# Patient Record
Sex: MALE | Race: WHITE | NOT HISPANIC OR LATINO | Employment: FULL TIME | RURAL
[De-identification: names, ages, dates, MRNs, and addresses within clinical notes are randomized per-mention and may not be internally consistent; named-entity substitution may affect disease eponyms.]

---

## 2017-01-16 ENCOUNTER — HISTORICAL (OUTPATIENT)
Dept: ADMINISTRATIVE | Facility: HOSPITAL | Age: 62
End: 2017-01-16

## 2017-01-17 LAB
LAB AP CLINICAL INFORMATION: NORMAL
LAB AP COMMENTS: NORMAL
LAB AP DIAGNOSIS - HISTORICAL: NORMAL
LAB AP GROSS PATHOLOGY - HISTORICAL: NORMAL
LAB AP SPECIMEN SUBMITTED - HISTORICAL: NORMAL

## 2020-09-28 ENCOUNTER — HISTORICAL (OUTPATIENT)
Dept: ADMINISTRATIVE | Facility: HOSPITAL | Age: 65
End: 2020-09-28

## 2021-01-19 ENCOUNTER — HISTORICAL (OUTPATIENT)
Dept: ADMINISTRATIVE | Facility: HOSPITAL | Age: 66
End: 2021-01-19

## 2021-01-19 LAB
GLUCOSE SERPL-MCNC: 172 MG/DL (ref 70–105)
GLUCOSE SERPL-MCNC: 211 MG/DL (ref 70–105)

## 2021-08-04 ENCOUNTER — OFFICE VISIT (OUTPATIENT)
Dept: FAMILY MEDICINE | Facility: CLINIC | Age: 66
End: 2021-08-04
Payer: COMMERCIAL

## 2021-08-04 DIAGNOSIS — Z11.52 ENCOUNTER FOR SCREENING FOR COVID-19: Primary | ICD-10-CM

## 2021-08-04 PROCEDURE — 99202 OFFICE O/P NEW SF 15 MIN: CPT | Mod: GC,,, | Performed by: FAMILY MEDICINE

## 2021-08-04 PROCEDURE — 87635 SARS-COV-2 (COVID-19) QUALITATIVE PCR: ICD-10-PCS | Mod: ,,, | Performed by: CLINICAL MEDICAL LABORATORY

## 2021-08-04 PROCEDURE — 87635 SARS-COV-2 COVID-19 AMP PRB: CPT | Mod: ,,, | Performed by: CLINICAL MEDICAL LABORATORY

## 2021-08-04 PROCEDURE — 99202 PR OFFICE/OUTPT VISIT, NEW, LEVL II, 15-29 MIN: ICD-10-PCS | Mod: GC,,, | Performed by: FAMILY MEDICINE

## 2021-08-05 LAB — SARS-COV-2 RNA RESP QL NAA+PROBE: NEGATIVE

## 2021-08-11 DIAGNOSIS — R50.9 FEVER, UNSPECIFIED FEVER CAUSE: Primary | ICD-10-CM

## 2021-08-11 LAB
CTP QC/QA: YES
SARS-COV-2 RDRP RESP QL NAA+PROBE: POSITIVE

## 2021-08-11 PROCEDURE — U0002: ICD-10-PCS | Mod: ,,, | Performed by: NURSE PRACTITIONER

## 2021-08-11 PROCEDURE — U0002 COVID-19 LAB TEST NON-CDC: HCPCS | Mod: ,,, | Performed by: NURSE PRACTITIONER

## 2021-08-11 RX ORDER — IVERMECTIN 3 MG/1
18 TABLET ORAL ONCE
Qty: 6 TABLET | Refills: 0 | Status: SHIPPED | OUTPATIENT
Start: 2021-08-11 | End: 2021-08-11

## 2021-08-11 RX ORDER — PREDNISONE 20 MG/1
60 TABLET ORAL DAILY
Qty: 15 TABLET | Refills: 0 | Status: SHIPPED | OUTPATIENT
Start: 2021-08-11 | End: 2021-10-13 | Stop reason: ALTCHOICE

## 2021-08-11 RX ORDER — CETIRIZINE HYDROCHLORIDE, PSEUDOEPHEDRINE HYDROCHLORIDE 5; 120 MG/1; MG/1
5-120 TABLET, FILM COATED, EXTENDED RELEASE ORAL 2 TIMES DAILY
Qty: 24 TABLET | Refills: 5 | Status: SHIPPED | OUTPATIENT
Start: 2021-08-11 | End: 2021-08-21

## 2021-08-11 RX ORDER — AZITHROMYCIN 250 MG/1
TABLET, FILM COATED ORAL
Qty: 6 TABLET | Refills: 1 | Status: SHIPPED | OUTPATIENT
Start: 2021-08-11 | End: 2021-10-13 | Stop reason: ALTCHOICE

## 2021-08-12 ENCOUNTER — INFUSION (OUTPATIENT)
Dept: INFECTIOUS DISEASES | Facility: HOSPITAL | Age: 66
End: 2021-08-12
Attending: PSYCHOLOGIST
Payer: COMMERCIAL

## 2021-08-12 VITALS
RESPIRATION RATE: 18 BRPM | TEMPERATURE: 98 F | DIASTOLIC BLOOD PRESSURE: 79 MMHG | SYSTOLIC BLOOD PRESSURE: 153 MMHG | OXYGEN SATURATION: 97 % | HEART RATE: 82 BPM

## 2021-08-12 DIAGNOSIS — U07.1 COVID-19: Primary | ICD-10-CM

## 2021-08-12 PROCEDURE — M0243 CASIRIVI AND IMDEVI INFUSION: HCPCS | Performed by: FAMILY MEDICINE

## 2021-08-12 PROCEDURE — 25000003 PHARM REV CODE 250: Performed by: FAMILY MEDICINE

## 2021-08-12 PROCEDURE — 63600175 PHARM REV CODE 636 W HCPCS: Performed by: FAMILY MEDICINE

## 2021-08-12 RX ORDER — ALBUTEROL SULFATE 90 UG/1
2 AEROSOL, METERED RESPIRATORY (INHALATION)
Status: DISCONTINUED | OUTPATIENT
Start: 2021-08-12 | End: 2021-10-13

## 2021-08-12 RX ORDER — EPINEPHRINE 0.3 MG/.3ML
0.3 INJECTION SUBCUTANEOUS
Status: DISCONTINUED | OUTPATIENT
Start: 2021-08-12 | End: 2021-10-13

## 2021-08-12 RX ORDER — ONDANSETRON 4 MG/1
4 TABLET, ORALLY DISINTEGRATING ORAL ONCE AS NEEDED
Status: DISCONTINUED | OUTPATIENT
Start: 2021-08-12 | End: 2021-10-13

## 2021-08-12 RX ORDER — ACETAMINOPHEN 325 MG/1
650 TABLET ORAL ONCE AS NEEDED
Status: DISCONTINUED | OUTPATIENT
Start: 2021-08-12 | End: 2021-10-13

## 2021-08-12 RX ORDER — DIPHENHYDRAMINE HYDROCHLORIDE 50 MG/ML
25 INJECTION INTRAMUSCULAR; INTRAVENOUS ONCE AS NEEDED
Status: DISCONTINUED | OUTPATIENT
Start: 2021-08-12 | End: 2021-10-13

## 2021-08-12 RX ADMIN — CASIRIVIMAB AND IMDEVIMAB 600 MG: 600; 600 INJECTION, SOLUTION, CONCENTRATE INTRAVENOUS at 08:08

## 2021-08-22 ENCOUNTER — OFFICE VISIT (OUTPATIENT)
Dept: FAMILY MEDICINE | Facility: CLINIC | Age: 66
End: 2021-08-22
Payer: COMMERCIAL

## 2021-08-22 VITALS — TEMPERATURE: 98 F | OXYGEN SATURATION: 95 % | HEART RATE: 90 BPM

## 2021-08-22 DIAGNOSIS — Z20.822 COVID-19 RULED OUT: Primary | ICD-10-CM

## 2021-08-22 LAB
CTP QC/QA: YES
FLUAV AG NPH QL: NEGATIVE
FLUBV AG NPH QL: NEGATIVE
SARS-COV-2 AG RESP QL IA.RAPID: NEGATIVE

## 2021-08-22 PROCEDURE — 87428 POCT SARS-COV2 (COVID) WITH FLU ANTIGEN: ICD-10-PCS | Mod: QW,,, | Performed by: NURSE PRACTITIONER

## 2021-08-22 PROCEDURE — 1159F MED LIST DOCD IN RCRD: CPT | Mod: CPTII,,, | Performed by: NURSE PRACTITIONER

## 2021-08-22 PROCEDURE — 99213 OFFICE O/P EST LOW 20 MIN: CPT | Mod: CS,,, | Performed by: NURSE PRACTITIONER

## 2021-08-22 PROCEDURE — 1160F RVW MEDS BY RX/DR IN RCRD: CPT | Mod: CPTII,,, | Performed by: NURSE PRACTITIONER

## 2021-08-22 PROCEDURE — 99051 PR MEDICAL SERVICES, EVE/WKEND/HOLIDAY: ICD-10-PCS | Mod: ,,, | Performed by: NURSE PRACTITIONER

## 2021-08-22 PROCEDURE — 1160F PR REVIEW ALL MEDS BY PRESCRIBER/CLIN PHARMACIST DOCUMENTED: ICD-10-PCS | Mod: CPTII,,, | Performed by: NURSE PRACTITIONER

## 2021-08-22 PROCEDURE — 99051 MED SERV EVE/WKEND/HOLIDAY: CPT | Mod: ,,, | Performed by: NURSE PRACTITIONER

## 2021-08-22 PROCEDURE — 87428 SARSCOV & INF VIR A&B AG IA: CPT | Mod: QW,,, | Performed by: NURSE PRACTITIONER

## 2021-08-22 PROCEDURE — 99213 PR OFFICE/OUTPT VISIT, EST, LEVL III, 20-29 MIN: ICD-10-PCS | Mod: CS,,, | Performed by: NURSE PRACTITIONER

## 2021-08-22 PROCEDURE — 1159F PR MEDICATION LIST DOCUMENTED IN MEDICAL RECORD: ICD-10-PCS | Mod: CPTII,,, | Performed by: NURSE PRACTITIONER

## 2021-10-13 ENCOUNTER — OFFICE VISIT (OUTPATIENT)
Dept: PRIMARY CARE CLINIC | Facility: CLINIC | Age: 66
End: 2021-10-13
Payer: COMMERCIAL

## 2021-10-13 VITALS
DIASTOLIC BLOOD PRESSURE: 60 MMHG | HEIGHT: 65 IN | TEMPERATURE: 98 F | RESPIRATION RATE: 16 BRPM | SYSTOLIC BLOOD PRESSURE: 126 MMHG | HEART RATE: 76 BPM | BODY MASS INDEX: 30.99 KG/M2 | OXYGEN SATURATION: 96 % | WEIGHT: 186 LBS

## 2021-10-13 DIAGNOSIS — M54.41 ACUTE RIGHT-SIDED LOW BACK PAIN WITH RIGHT-SIDED SCIATICA: Primary | ICD-10-CM

## 2021-10-13 DIAGNOSIS — M54.31 SCIATICA OF RIGHT SIDE: ICD-10-CM

## 2021-10-13 DIAGNOSIS — N40.0 BENIGN PROSTATIC HYPERPLASIA, UNSPECIFIED WHETHER LOWER URINARY TRACT SYMPTOMS PRESENT: ICD-10-CM

## 2021-10-13 DIAGNOSIS — R30.0 DYSURIA: ICD-10-CM

## 2021-10-13 PROCEDURE — 3008F PR BODY MASS INDEX (BMI) DOCUMENTED: ICD-10-PCS | Mod: CPTII,,, | Performed by: NURSE PRACTITIONER

## 2021-10-13 PROCEDURE — 1159F MED LIST DOCD IN RCRD: CPT | Mod: CPTII,,, | Performed by: NURSE PRACTITIONER

## 2021-10-13 PROCEDURE — 3008F BODY MASS INDEX DOCD: CPT | Mod: CPTII,,, | Performed by: NURSE PRACTITIONER

## 2021-10-13 PROCEDURE — 99213 OFFICE O/P EST LOW 20 MIN: CPT | Mod: 25,,, | Performed by: NURSE PRACTITIONER

## 2021-10-13 PROCEDURE — 3078F DIAST BP <80 MM HG: CPT | Mod: CPTII,,, | Performed by: NURSE PRACTITIONER

## 2021-10-13 PROCEDURE — 99213 PR OFFICE/OUTPT VISIT, EST, LEVL III, 20-29 MIN: ICD-10-PCS | Mod: 25,,, | Performed by: NURSE PRACTITIONER

## 2021-10-13 PROCEDURE — 1159F PR MEDICATION LIST DOCUMENTED IN MEDICAL RECORD: ICD-10-PCS | Mod: CPTII,,, | Performed by: NURSE PRACTITIONER

## 2021-10-13 PROCEDURE — 3074F SYST BP LT 130 MM HG: CPT | Mod: CPTII,,, | Performed by: NURSE PRACTITIONER

## 2021-10-13 PROCEDURE — 1160F PR REVIEW ALL MEDS BY PRESCRIBER/CLIN PHARMACIST DOCUMENTED: ICD-10-PCS | Mod: CPTII,,, | Performed by: NURSE PRACTITIONER

## 2021-10-13 PROCEDURE — 1160F RVW MEDS BY RX/DR IN RCRD: CPT | Mod: CPTII,,, | Performed by: NURSE PRACTITIONER

## 2021-10-13 PROCEDURE — 96372 PR INJECTION,THERAP/PROPH/DIAG2ST, IM OR SUBCUT: ICD-10-PCS | Mod: ,,, | Performed by: NURSE PRACTITIONER

## 2021-10-13 PROCEDURE — 3074F PR MOST RECENT SYSTOLIC BLOOD PRESSURE < 130 MM HG: ICD-10-PCS | Mod: CPTII,,, | Performed by: NURSE PRACTITIONER

## 2021-10-13 PROCEDURE — 3078F PR MOST RECENT DIASTOLIC BLOOD PRESSURE < 80 MM HG: ICD-10-PCS | Mod: CPTII,,, | Performed by: NURSE PRACTITIONER

## 2021-10-13 PROCEDURE — 96372 THER/PROPH/DIAG INJ SC/IM: CPT | Mod: ,,, | Performed by: NURSE PRACTITIONER

## 2021-10-13 RX ORDER — KETOROLAC TROMETHAMINE 30 MG/ML
30 INJECTION, SOLUTION INTRAMUSCULAR; INTRAVENOUS
Status: COMPLETED | OUTPATIENT
Start: 2021-10-13 | End: 2021-10-13

## 2021-10-13 RX ORDER — METFORMIN HYDROCHLORIDE 1000 MG/1
1 TABLET ORAL 2 TIMES DAILY
COMMUNITY
End: 2021-10-25 | Stop reason: SDUPTHER

## 2021-10-13 RX ORDER — MONTELUKAST SODIUM 10 MG/1
1 TABLET ORAL DAILY
COMMUNITY
Start: 2021-07-19 | End: 2021-10-25 | Stop reason: SDUPTHER

## 2021-10-13 RX ORDER — CITALOPRAM 40 MG/1
1 TABLET, FILM COATED ORAL DAILY
COMMUNITY
Start: 2021-09-23 | End: 2021-10-25 | Stop reason: SDUPTHER

## 2021-10-13 RX ORDER — CYCLOBENZAPRINE HCL 10 MG
10 TABLET ORAL 3 TIMES DAILY PRN
Qty: 30 TABLET | Refills: 0 | Status: SHIPPED | OUTPATIENT
Start: 2021-10-13 | End: 2022-03-09

## 2021-10-13 RX ORDER — FOLIC ACID/MULTIVIT,IRON,MINER 0.4MG-18MG
1 TABLET ORAL DAILY
COMMUNITY

## 2021-10-13 RX ORDER — IBUPROFEN 800 MG/1
800 TABLET ORAL EVERY 6 HOURS PRN
Qty: 60 TABLET | Refills: 0 | Status: SHIPPED | OUTPATIENT
Start: 2021-10-13 | End: 2022-03-09

## 2021-10-13 RX ORDER — CETIRIZINE HYDROCHLORIDE 10 MG/1
1 TABLET ORAL DAILY
COMMUNITY
End: 2021-10-25 | Stop reason: SDUPTHER

## 2021-10-13 RX ADMIN — KETOROLAC TROMETHAMINE 30 MG: 30 INJECTION, SOLUTION INTRAMUSCULAR; INTRAVENOUS at 09:10

## 2021-10-25 DIAGNOSIS — N40.0 BENIGN PROSTATIC HYPERPLASIA, UNSPECIFIED WHETHER LOWER URINARY TRACT SYMPTOMS PRESENT: Primary | ICD-10-CM

## 2021-10-25 DIAGNOSIS — F32.A DEPRESSION, UNSPECIFIED DEPRESSION TYPE: ICD-10-CM

## 2021-10-25 DIAGNOSIS — J30.2 SEASONAL ALLERGIC RHINITIS, UNSPECIFIED TRIGGER: ICD-10-CM

## 2021-10-25 DIAGNOSIS — E11.9 DIABETES MELLITUS WITHOUT COMPLICATION: ICD-10-CM

## 2021-10-25 RX ORDER — TAMSULOSIN HYDROCHLORIDE 0.4 MG/1
1 CAPSULE ORAL NIGHTLY
Qty: 90 CAPSULE | Refills: 3 | Status: SHIPPED | OUTPATIENT
Start: 2021-10-25 | End: 2021-10-29

## 2021-10-25 RX ORDER — MONTELUKAST SODIUM 10 MG/1
10 TABLET ORAL DAILY
Qty: 90 TABLET | Refills: 3 | Status: SHIPPED | OUTPATIENT
Start: 2021-10-25 | End: 2022-11-28 | Stop reason: SDUPTHER

## 2021-10-25 RX ORDER — CETIRIZINE HYDROCHLORIDE 10 MG/1
10 TABLET ORAL DAILY
Qty: 90 TABLET | Refills: 3 | Status: SHIPPED | OUTPATIENT
Start: 2021-10-25 | End: 2022-10-27

## 2021-10-25 RX ORDER — CITALOPRAM 40 MG/1
40 TABLET, FILM COATED ORAL DAILY
Qty: 90 TABLET | Refills: 3 | Status: SHIPPED | OUTPATIENT
Start: 2021-10-25 | End: 2022-11-28 | Stop reason: SDUPTHER

## 2021-10-25 RX ORDER — METFORMIN HYDROCHLORIDE 1000 MG/1
1000 TABLET ORAL 2 TIMES DAILY
Qty: 180 TABLET | Refills: 3 | Status: SHIPPED | OUTPATIENT
Start: 2021-10-25 | End: 2022-10-27 | Stop reason: SDUPTHER

## 2021-10-29 ENCOUNTER — OFFICE VISIT (OUTPATIENT)
Dept: UROLOGY | Facility: CLINIC | Age: 66
End: 2021-10-29
Payer: COMMERCIAL

## 2021-10-29 ENCOUNTER — TELEPHONE (OUTPATIENT)
Dept: UROLOGY | Facility: CLINIC | Age: 66
End: 2021-10-29
Payer: COMMERCIAL

## 2021-10-29 VITALS
WEIGHT: 186 LBS | SYSTOLIC BLOOD PRESSURE: 142 MMHG | HEART RATE: 93 BPM | OXYGEN SATURATION: 95 % | TEMPERATURE: 98 F | BODY MASS INDEX: 30.99 KG/M2 | HEIGHT: 65 IN | DIASTOLIC BLOOD PRESSURE: 80 MMHG

## 2021-10-29 DIAGNOSIS — Z12.5 ENCOUNTER FOR PROSTATE CANCER SCREENING: ICD-10-CM

## 2021-10-29 DIAGNOSIS — N40.1 BPH WITH OBSTRUCTION/LOWER URINARY TRACT SYMPTOMS: Chronic | ICD-10-CM

## 2021-10-29 DIAGNOSIS — N41.0 ACUTE PROSTATITIS: Primary | ICD-10-CM

## 2021-10-29 DIAGNOSIS — N40.0 BENIGN PROSTATIC HYPERPLASIA, UNSPECIFIED WHETHER LOWER URINARY TRACT SYMPTOMS PRESENT: ICD-10-CM

## 2021-10-29 DIAGNOSIS — N13.8 BPH WITH OBSTRUCTION/LOWER URINARY TRACT SYMPTOMS: Chronic | ICD-10-CM

## 2021-10-29 LAB
BILIRUB UR QL STRIP: NEGATIVE
CLARITY UR: CLEAR
COLOR UR: YELLOW
GLUCOSE UR STRIP-MCNC: 250 MG/DL
KETONES UR STRIP-SCNC: NEGATIVE MG/DL
LEUKOCYTE ESTERASE UR QL STRIP: NEGATIVE
NITRITE UR QL STRIP: NEGATIVE
PH UR STRIP: 6 PH UNITS
PROT UR QL STRIP: NEGATIVE
RBC # UR STRIP: NEGATIVE /UL
SP GR UR STRIP: 1.01
UROBILINOGEN UR STRIP-ACNC: 0.2 MG/DL

## 2021-10-29 PROCEDURE — 3008F PR BODY MASS INDEX (BMI) DOCUMENTED: ICD-10-PCS | Mod: CPTII,,, | Performed by: NURSE PRACTITIONER

## 2021-10-29 PROCEDURE — 81003 URINALYSIS AUTO W/O SCOPE: CPT | Mod: QW,,, | Performed by: CLINICAL MEDICAL LABORATORY

## 2021-10-29 PROCEDURE — 3079F PR MOST RECENT DIASTOLIC BLOOD PRESSURE 80-89 MM HG: ICD-10-PCS | Mod: CPTII,,, | Performed by: NURSE PRACTITIONER

## 2021-10-29 PROCEDURE — 3077F PR MOST RECENT SYSTOLIC BLOOD PRESSURE >= 140 MM HG: ICD-10-PCS | Mod: CPTII,,, | Performed by: NURSE PRACTITIONER

## 2021-10-29 PROCEDURE — 51798 US URINE CAPACITY MEASURE: CPT | Mod: PBBFAC | Performed by: NURSE PRACTITIONER

## 2021-10-29 PROCEDURE — 3079F DIAST BP 80-89 MM HG: CPT | Mod: CPTII,,, | Performed by: NURSE PRACTITIONER

## 2021-10-29 PROCEDURE — 99213 OFFICE O/P EST LOW 20 MIN: CPT | Mod: PBBFAC | Performed by: NURSE PRACTITIONER

## 2021-10-29 PROCEDURE — 99214 OFFICE O/P EST MOD 30 MIN: CPT | Mod: S$PBB,,, | Performed by: NURSE PRACTITIONER

## 2021-10-29 PROCEDURE — 99214 PR OFFICE/OUTPT VISIT, EST, LEVL IV, 30-39 MIN: ICD-10-PCS | Mod: S$PBB,,, | Performed by: NURSE PRACTITIONER

## 2021-10-29 PROCEDURE — 3077F SYST BP >= 140 MM HG: CPT | Mod: CPTII,,, | Performed by: NURSE PRACTITIONER

## 2021-10-29 PROCEDURE — 3008F BODY MASS INDEX DOCD: CPT | Mod: CPTII,,, | Performed by: NURSE PRACTITIONER

## 2021-10-29 PROCEDURE — 81003 URINALYSIS, REFLEX TO URINE CULTURE: ICD-10-PCS | Mod: QW,,, | Performed by: CLINICAL MEDICAL LABORATORY

## 2021-10-29 RX ORDER — SULFAMETHOXAZOLE AND TRIMETHOPRIM 800; 160 MG/1; MG/1
1 TABLET ORAL 2 TIMES DAILY
Qty: 60 TABLET | Refills: 0 | Status: SHIPPED | OUTPATIENT
Start: 2021-10-29 | End: 2021-11-28

## 2021-10-29 RX ORDER — TAMSULOSIN HYDROCHLORIDE 0.4 MG/1
1 CAPSULE ORAL 2 TIMES DAILY
Qty: 90 CAPSULE | Refills: 3 | Status: SHIPPED | OUTPATIENT
Start: 2021-10-29 | End: 2022-06-16 | Stop reason: SDUPTHER

## 2021-11-01 ENCOUNTER — TELEPHONE (OUTPATIENT)
Dept: UROLOGY | Facility: CLINIC | Age: 66
End: 2021-11-01
Payer: COMMERCIAL

## 2021-12-16 DIAGNOSIS — E11.9 DIABETES MELLITUS WITHOUT COMPLICATION: Primary | ICD-10-CM

## 2021-12-17 ENCOUNTER — OFFICE VISIT (OUTPATIENT)
Dept: UROLOGY | Facility: CLINIC | Age: 66
End: 2021-12-17
Payer: COMMERCIAL

## 2021-12-17 ENCOUNTER — TELEPHONE (OUTPATIENT)
Dept: UROLOGY | Facility: CLINIC | Age: 66
End: 2021-12-17
Payer: COMMERCIAL

## 2021-12-17 VITALS
BODY MASS INDEX: 30.66 KG/M2 | HEIGHT: 65 IN | WEIGHT: 184 LBS | SYSTOLIC BLOOD PRESSURE: 150 MMHG | OXYGEN SATURATION: 95 % | TEMPERATURE: 98 F | DIASTOLIC BLOOD PRESSURE: 70 MMHG | HEART RATE: 63 BPM

## 2021-12-17 DIAGNOSIS — N40.1 BPH WITH OBSTRUCTION/LOWER URINARY TRACT SYMPTOMS: Primary | ICD-10-CM

## 2021-12-17 DIAGNOSIS — N13.8 BPH WITH OBSTRUCTION/LOWER URINARY TRACT SYMPTOMS: Primary | ICD-10-CM

## 2021-12-17 DIAGNOSIS — N41.0 ACUTE PROSTATITIS: ICD-10-CM

## 2021-12-17 DIAGNOSIS — Z12.5 ENCOUNTER FOR PROSTATE CANCER SCREENING: ICD-10-CM

## 2021-12-17 PROCEDURE — 99214 PR OFFICE/OUTPT VISIT, EST, LEVL IV, 30-39 MIN: ICD-10-PCS | Mod: S$PBB,,, | Performed by: NURSE PRACTITIONER

## 2021-12-17 PROCEDURE — 99214 OFFICE O/P EST MOD 30 MIN: CPT | Mod: S$PBB,,, | Performed by: NURSE PRACTITIONER

## 2021-12-17 PROCEDURE — 99214 OFFICE O/P EST MOD 30 MIN: CPT | Mod: PBBFAC | Performed by: NURSE PRACTITIONER

## 2021-12-17 RX ORDER — FINASTERIDE 5 MG/1
5 TABLET, FILM COATED ORAL DAILY
Qty: 90 TABLET | Refills: 3 | Status: SHIPPED | OUTPATIENT
Start: 2021-12-17 | End: 2022-12-19 | Stop reason: SDUPTHER

## 2021-12-20 DIAGNOSIS — E11.9 DIABETES MELLITUS WITHOUT COMPLICATION: Primary | ICD-10-CM

## 2021-12-20 RX ORDER — EMPAGLIFLOZIN 10 MG/1
10 TABLET, FILM COATED ORAL DAILY
Qty: 90 TABLET | Refills: 1 | Status: SHIPPED | OUTPATIENT
Start: 2021-12-20 | End: 2022-03-09

## 2021-12-20 RX ORDER — DAPAGLIFLOZIN 10 MG/1
10 TABLET, FILM COATED ORAL DAILY
Qty: 90 TABLET | Refills: 3 | Status: SHIPPED | OUTPATIENT
Start: 2021-12-20 | End: 2021-12-20

## 2022-01-05 DIAGNOSIS — Z11.52 ENCOUNTER FOR SCREENING FOR COVID-19: Primary | ICD-10-CM

## 2022-01-14 ENCOUNTER — PATIENT MESSAGE (OUTPATIENT)
Dept: PRIMARY CARE CLINIC | Facility: CLINIC | Age: 67
End: 2022-01-14
Payer: COMMERCIAL

## 2022-03-09 RX ORDER — ALBUTEROL SULFATE 90 UG/1
2 AEROSOL, METERED RESPIRATORY (INHALATION) EVERY 6 HOURS PRN
Qty: 18 G | Refills: 0 | Status: SHIPPED | OUTPATIENT
Start: 2022-03-09 | End: 2022-06-08 | Stop reason: SDUPTHER

## 2022-03-09 RX ORDER — DAPAGLIFLOZIN 10 MG/1
1 TABLET, FILM COATED ORAL DAILY
COMMUNITY
Start: 2021-12-20 | End: 2023-07-20

## 2022-03-18 ENCOUNTER — OFFICE VISIT (OUTPATIENT)
Dept: UROLOGY | Facility: CLINIC | Age: 67
End: 2022-03-18
Payer: COMMERCIAL

## 2022-03-18 VITALS
DIASTOLIC BLOOD PRESSURE: 82 MMHG | SYSTOLIC BLOOD PRESSURE: 139 MMHG | WEIGHT: 184 LBS | TEMPERATURE: 98 F | OXYGEN SATURATION: 94 % | HEIGHT: 65 IN | HEART RATE: 95 BPM | BODY MASS INDEX: 30.66 KG/M2

## 2022-03-18 DIAGNOSIS — N40.1 BPH WITH OBSTRUCTION/LOWER URINARY TRACT SYMPTOMS: Primary | Chronic | ICD-10-CM

## 2022-03-18 DIAGNOSIS — N41.0 ACUTE PROSTATITIS: ICD-10-CM

## 2022-03-18 DIAGNOSIS — Z12.5 ENCOUNTER FOR PROSTATE CANCER SCREENING: ICD-10-CM

## 2022-03-18 DIAGNOSIS — N13.8 BPH WITH OBSTRUCTION/LOWER URINARY TRACT SYMPTOMS: Primary | Chronic | ICD-10-CM

## 2022-03-18 LAB
BILIRUB UR QL STRIP: NEGATIVE
CLARITY UR: CLEAR
COLOR UR: YELLOW
GLUCOSE UR STRIP-MCNC: >=1000 MG/DL
KETONES UR STRIP-SCNC: NEGATIVE MG/DL
LEUKOCYTE ESTERASE UR QL STRIP: NEGATIVE
NITRITE UR QL STRIP: NEGATIVE
PH UR STRIP: 6.5 PH UNITS
PROT UR QL STRIP: NEGATIVE
RBC # UR STRIP: NEGATIVE /UL
SP GR UR STRIP: <=1.005
UROBILINOGEN UR STRIP-ACNC: 0.2 MG/DL

## 2022-03-18 PROCEDURE — 3079F PR MOST RECENT DIASTOLIC BLOOD PRESSURE 80-89 MM HG: ICD-10-PCS | Mod: CPTII,,, | Performed by: NURSE PRACTITIONER

## 2022-03-18 PROCEDURE — 3008F PR BODY MASS INDEX (BMI) DOCUMENTED: ICD-10-PCS | Mod: CPTII,,, | Performed by: NURSE PRACTITIONER

## 2022-03-18 PROCEDURE — 1159F MED LIST DOCD IN RCRD: CPT | Mod: CPTII,,, | Performed by: NURSE PRACTITIONER

## 2022-03-18 PROCEDURE — 99214 OFFICE O/P EST MOD 30 MIN: CPT | Mod: S$PBB,,, | Performed by: NURSE PRACTITIONER

## 2022-03-18 PROCEDURE — 87086 URINE CULTURE/COLONY COUNT: CPT | Mod: ,,, | Performed by: CLINICAL MEDICAL LABORATORY

## 2022-03-18 PROCEDURE — 99214 OFFICE O/P EST MOD 30 MIN: CPT | Mod: PBBFAC | Performed by: NURSE PRACTITIONER

## 2022-03-18 PROCEDURE — 81003 URINALYSIS: ICD-10-PCS | Mod: QW,,, | Performed by: CLINICAL MEDICAL LABORATORY

## 2022-03-18 PROCEDURE — 3075F SYST BP GE 130 - 139MM HG: CPT | Mod: CPTII,,, | Performed by: NURSE PRACTITIONER

## 2022-03-18 PROCEDURE — 3008F BODY MASS INDEX DOCD: CPT | Mod: CPTII,,, | Performed by: NURSE PRACTITIONER

## 2022-03-18 PROCEDURE — 99214 PR OFFICE/OUTPT VISIT, EST, LEVL IV, 30-39 MIN: ICD-10-PCS | Mod: S$PBB,,, | Performed by: NURSE PRACTITIONER

## 2022-03-18 PROCEDURE — 3079F DIAST BP 80-89 MM HG: CPT | Mod: CPTII,,, | Performed by: NURSE PRACTITIONER

## 2022-03-18 PROCEDURE — 81003 URINALYSIS AUTO W/O SCOPE: CPT | Mod: QW,,, | Performed by: CLINICAL MEDICAL LABORATORY

## 2022-03-18 PROCEDURE — 87086 CULTURE, URINE: ICD-10-PCS | Mod: ,,, | Performed by: CLINICAL MEDICAL LABORATORY

## 2022-03-18 PROCEDURE — 3075F PR MOST RECENT SYSTOLIC BLOOD PRESS GE 130-139MM HG: ICD-10-PCS | Mod: CPTII,,, | Performed by: NURSE PRACTITIONER

## 2022-03-18 PROCEDURE — 1159F PR MEDICATION LIST DOCUMENTED IN MEDICAL RECORD: ICD-10-PCS | Mod: CPTII,,, | Performed by: NURSE PRACTITIONER

## 2022-03-18 NOTE — ASSESSMENT & PLAN NOTE
· IPSS 24  ·   · Cystoscopy for further evaluation of uncontrolled BPH with LUTS  · SHARON  · UA, CX

## 2022-03-20 LAB — UA COMPLETE W REFLEX CULTURE PNL UR: NO GROWTH

## 2022-03-24 DIAGNOSIS — N40.1 BPH WITH OBSTRUCTION/LOWER URINARY TRACT SYMPTOMS: Primary | ICD-10-CM

## 2022-03-24 DIAGNOSIS — N13.8 BPH WITH OBSTRUCTION/LOWER URINARY TRACT SYMPTOMS: Primary | ICD-10-CM

## 2022-03-25 ENCOUNTER — TELEPHONE (OUTPATIENT)
Dept: UROLOGY | Facility: CLINIC | Age: 67
End: 2022-03-25
Payer: COMMERCIAL

## 2022-03-25 NOTE — TELEPHONE ENCOUNTER
I called and spoke with pt and informed him of his SHARON scheduled for 4-14-22 at 2:00pm and he will see Dr queen for his cystoscopy same day at 3:00pm.  He says he will tell the wife.

## 2022-04-14 ENCOUNTER — PROCEDURE VISIT (OUTPATIENT)
Dept: UROLOGY | Facility: CLINIC | Age: 67
End: 2022-04-14
Payer: COMMERCIAL

## 2022-04-14 ENCOUNTER — HOSPITAL ENCOUNTER (OUTPATIENT)
Dept: RADIOLOGY | Facility: HOSPITAL | Age: 67
Discharge: HOME | End: 2022-04-14
Attending: NURSE PRACTITIONER
Payer: COMMERCIAL

## 2022-04-14 ENCOUNTER — PATIENT MESSAGE (OUTPATIENT)
Dept: UROLOGY | Facility: CLINIC | Age: 67
End: 2022-04-14
Payer: COMMERCIAL

## 2022-04-14 VITALS
SYSTOLIC BLOOD PRESSURE: 117 MMHG | HEART RATE: 105 BPM | BODY MASS INDEX: 30.99 KG/M2 | WEIGHT: 186 LBS | HEIGHT: 65 IN | DIASTOLIC BLOOD PRESSURE: 81 MMHG

## 2022-04-14 DIAGNOSIS — N31.9 NEUROGENIC BLADDER: ICD-10-CM

## 2022-04-14 DIAGNOSIS — R39.13 INTERMITTENT URINARY STREAM: ICD-10-CM

## 2022-04-14 DIAGNOSIS — N13.8 BPH WITH OBSTRUCTION/LOWER URINARY TRACT SYMPTOMS: Primary | ICD-10-CM

## 2022-04-14 DIAGNOSIS — N40.1 BPH WITH OBSTRUCTION/LOWER URINARY TRACT SYMPTOMS: Primary | ICD-10-CM

## 2022-04-14 DIAGNOSIS — N40.1 BPH WITH OBSTRUCTION/LOWER URINARY TRACT SYMPTOMS: ICD-10-CM

## 2022-04-14 DIAGNOSIS — N13.8 BPH WITH OBSTRUCTION/LOWER URINARY TRACT SYMPTOMS: ICD-10-CM

## 2022-04-14 PROCEDURE — 52000 PR CYSTOURETHROSCOPY: ICD-10-PCS | Mod: S$PBB,,, | Performed by: UROLOGY

## 2022-04-14 PROCEDURE — 52000 CYSTOURETHROSCOPY: CPT | Mod: PBBFAC | Performed by: UROLOGY

## 2022-04-14 PROCEDURE — 76770 US KIDNEY: ICD-10-PCS | Mod: 26,,, | Performed by: RADIOLOGY

## 2022-04-14 PROCEDURE — 76770 US EXAM ABDO BACK WALL COMP: CPT | Mod: TC

## 2022-04-14 PROCEDURE — 76770 US EXAM ABDO BACK WALL COMP: CPT | Mod: 26,,, | Performed by: RADIOLOGY

## 2022-04-14 PROCEDURE — 52000 CYSTOURETHROSCOPY: CPT | Mod: S$PBB,,, | Performed by: UROLOGY

## 2022-04-14 RX ORDER — LIDOCAINE HYDROCHLORIDE 20 MG/ML
10 JELLY TOPICAL
Status: COMPLETED | OUTPATIENT
Start: 2022-04-14 | End: 2022-04-14

## 2022-04-14 RX ADMIN — LIDOCAINE HYDROCHLORIDE 10 ML: 20 JELLY TOPICAL at 04:04

## 2022-04-14 NOTE — PATIENT INSTRUCTIONS
Patient seems to have a neurogenic bladder.  He will stay on the tamsulosin and finasteride, since he  does seem to be doing some better now.  Six month appointment with Ms. Gracía.  Patient given 3 days Cipro 500 mg b.i.d. to cover instrumentation.

## 2022-04-14 NOTE — PROCEDURES
"Procedures        Mr. Soliz has been worked into clinic today for first Urological evaluation of significant voiding symptoms.  He is currently taking flomax for BPH with LUTS.  IPSS 27, reflecting significant straining, urgency, intermittency and not feeling he is totally emptying.   ml.  C/o feeling like he's "sitting on a bag of rocks".  No hematuria.  Chronic LBP with right sciatica.  (10/29/2021)----------------------------------------        Review of Systems   Constitutional: Negative.    Gastrointestinal: Negative.    Genitourinary: Positive for difficulty urinating, frequency, testicular pain and urgency. Negative for decreased urine volume, dysuria, enuresis, flank pain, genital sores, hematuria, penile discharge, penile pain, penile swelling and scrotal swelling.        Straining, weak, intermitent stream   Musculoskeletal: Positive for arthralgias, back pain, joint swelling and myalgias. Negative for gait problem, neck pain and neck stiffness.   -----------------------------------------------------------------------------------------------  Above note is the note of Ms. Mere García of October 29, 2021    Mr. Soliz has had problems with intermittency of stream plus pressure and sensation of needing to urinate.  Has perineal discomfort at times with sensation of needing to void.  No previous workup.  Ms. García started him on tamsulosin b.i.d. in October which did not really seem to help.  She started him on finasteride in December and he thinks that he may be making some progress since he has had that drug added to the regimen.  Does not have as much problem with voiding now as he did.  He is in today for his workup with renal ultrasound and cystoscopy.  Patient agreeable to going ahead with cysto.  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  He was placed in the supine position and prepared for flexible cystoscopy.  Urethra was anesthetized with lidocaine jelly.  No sedation was given.  The 16.5 Wolof Olympus flexible digital cystoscope was passed transurethrally into the bladder.  Anterior urethra was clear.  Posterior urethra had early trilobar enlargement with partial obstruction.  Entire prostatic urethra was only about 3 cm in length with functional length of prostate being about 2 cm.  There was lot of inflammatory change in the prostatic urethra but only moderate obstruction.  Bladder was moderately trabeculated.  Bladder was emptied with aspiration and the residual about 80 mL.  The bladder appeared to be elongated suggesting possibility there is some neurogenic component to his problem.  Ureteral orifices were normal in size shape position with bilateral clear efflux for that could be seen by looking forward.  There was only mild obstruction of the bladder neck.  Retrograde view of the bladder neck revealed a cystic structure noted on the anterior bladder neck to the left of midline.  This appeared to be a benign simple cyst.  Repeat viewing looking in forward fashion failed to reveal a definite source for the lesion.  I could not determine exactly what cause of problem was.  The bladder was left moderately distended and scope removed.  Similar findings were seen on the way out as on the way in.  Postvoiding bladder scan was 43 mL.  I suspect his main voiding difficulty is neurogenic in origin rather than related to an obstructive uropathy.  Tolerated procedure well and left for regular exam room in satisfactory condition.  Recommendation is for treating as neurogenic problem.  I do not think that  prostatectomy would give him much relief of his symptoms.

## 2022-05-19 ENCOUNTER — OFFICE VISIT (OUTPATIENT)
Dept: PRIMARY CARE CLINIC | Facility: CLINIC | Age: 67
End: 2022-05-19
Payer: COMMERCIAL

## 2022-05-19 VITALS
SYSTOLIC BLOOD PRESSURE: 138 MMHG | TEMPERATURE: 98 F | BODY MASS INDEX: 31.49 KG/M2 | OXYGEN SATURATION: 95 % | DIASTOLIC BLOOD PRESSURE: 60 MMHG | HEART RATE: 85 BPM | WEIGHT: 189 LBS | HEIGHT: 65 IN | RESPIRATION RATE: 18 BRPM

## 2022-05-19 DIAGNOSIS — G89.29 CHRONIC RIGHT-SIDED THORACIC BACK PAIN: ICD-10-CM

## 2022-05-19 DIAGNOSIS — M54.6 CHRONIC RIGHT-SIDED THORACIC BACK PAIN: ICD-10-CM

## 2022-05-19 DIAGNOSIS — Z11.4 SCREENING FOR HIV (HUMAN IMMUNODEFICIENCY VIRUS): ICD-10-CM

## 2022-05-19 DIAGNOSIS — J32.9 SINUSITIS, UNSPECIFIED CHRONICITY, UNSPECIFIED LOCATION: ICD-10-CM

## 2022-05-19 DIAGNOSIS — Z12.11 SCREENING FOR MALIGNANT NEOPLASM OF COLON: Primary | ICD-10-CM

## 2022-05-19 DIAGNOSIS — R05.9 COUGH: Primary | ICD-10-CM

## 2022-05-19 DIAGNOSIS — Z11.59 NEED FOR HEPATITIS C SCREENING TEST: ICD-10-CM

## 2022-05-19 DIAGNOSIS — E11.9 DIABETES MELLITUS WITHOUT COMPLICATION: ICD-10-CM

## 2022-05-19 LAB — SARS-COV+SARS-COV-2 AG RESP QL IA.RAPID: NEGATIVE

## 2022-05-19 PROCEDURE — 87426 SARS ANTIGEN(FIA): ICD-10-PCS | Mod: QW,,, | Performed by: CLINICAL MEDICAL LABORATORY

## 2022-05-19 PROCEDURE — 96372 THER/PROPH/DIAG INJ SC/IM: CPT | Mod: ,,, | Performed by: NURSE PRACTITIONER

## 2022-05-19 PROCEDURE — 99214 PR OFFICE/OUTPT VISIT, EST, LEVL IV, 30-39 MIN: ICD-10-PCS | Mod: 25,,, | Performed by: NURSE PRACTITIONER

## 2022-05-19 PROCEDURE — 3078F DIAST BP <80 MM HG: CPT | Mod: CPTII,,, | Performed by: NURSE PRACTITIONER

## 2022-05-19 PROCEDURE — 87426 SARSCOV CORONAVIRUS AG IA: CPT | Mod: QW,,, | Performed by: CLINICAL MEDICAL LABORATORY

## 2022-05-19 PROCEDURE — 3075F PR MOST RECENT SYSTOLIC BLOOD PRESS GE 130-139MM HG: ICD-10-PCS | Mod: CPTII,,, | Performed by: NURSE PRACTITIONER

## 2022-05-19 PROCEDURE — 1159F PR MEDICATION LIST DOCUMENTED IN MEDICAL RECORD: ICD-10-PCS | Mod: CPTII,,, | Performed by: NURSE PRACTITIONER

## 2022-05-19 PROCEDURE — 3078F PR MOST RECENT DIASTOLIC BLOOD PRESSURE < 80 MM HG: ICD-10-PCS | Mod: CPTII,,, | Performed by: NURSE PRACTITIONER

## 2022-05-19 PROCEDURE — 96372 PR INJECTION,THERAP/PROPH/DIAG2ST, IM OR SUBCUT: ICD-10-PCS | Mod: ,,, | Performed by: NURSE PRACTITIONER

## 2022-05-19 PROCEDURE — 99214 OFFICE O/P EST MOD 30 MIN: CPT | Mod: 25,,, | Performed by: NURSE PRACTITIONER

## 2022-05-19 PROCEDURE — 3075F SYST BP GE 130 - 139MM HG: CPT | Mod: CPTII,,, | Performed by: NURSE PRACTITIONER

## 2022-05-19 PROCEDURE — 1159F MED LIST DOCD IN RCRD: CPT | Mod: CPTII,,, | Performed by: NURSE PRACTITIONER

## 2022-05-19 PROCEDURE — 1160F RVW MEDS BY RX/DR IN RCRD: CPT | Mod: CPTII,,, | Performed by: NURSE PRACTITIONER

## 2022-05-19 PROCEDURE — 3008F BODY MASS INDEX DOCD: CPT | Mod: CPTII,,, | Performed by: NURSE PRACTITIONER

## 2022-05-19 PROCEDURE — 1160F PR REVIEW ALL MEDS BY PRESCRIBER/CLIN PHARMACIST DOCUMENTED: ICD-10-PCS | Mod: CPTII,,, | Performed by: NURSE PRACTITIONER

## 2022-05-19 PROCEDURE — 3008F PR BODY MASS INDEX (BMI) DOCUMENTED: ICD-10-PCS | Mod: CPTII,,, | Performed by: NURSE PRACTITIONER

## 2022-05-19 RX ORDER — CYCLOBENZAPRINE HCL 10 MG
10 TABLET ORAL NIGHTLY PRN
Qty: 30 TABLET | Refills: 0 | Status: SHIPPED | OUTPATIENT
Start: 2022-05-19 | End: 2022-08-11

## 2022-05-19 RX ORDER — CEFTRIAXONE 1 G/1
1 INJECTION, POWDER, FOR SOLUTION INTRAMUSCULAR; INTRAVENOUS
Status: COMPLETED | OUTPATIENT
Start: 2022-05-19 | End: 2022-05-19

## 2022-05-19 RX ORDER — AZITHROMYCIN 250 MG/1
TABLET, FILM COATED ORAL
Qty: 6 TABLET | Refills: 0 | Status: SHIPPED | OUTPATIENT
Start: 2022-05-19 | End: 2022-08-11

## 2022-05-19 RX ORDER — PROMETHAZINE HYDROCHLORIDE AND DEXTROMETHORPHAN HYDROBROMIDE 6.25; 15 MG/5ML; MG/5ML
5 SYRUP ORAL EVERY 4 HOURS PRN
Qty: 240 ML | Refills: 0 | Status: SHIPPED | OUTPATIENT
Start: 2022-05-19 | End: 2022-08-11

## 2022-05-19 RX ADMIN — CEFTRIAXONE 1 G: 1 INJECTION, POWDER, FOR SOLUTION INTRAMUSCULAR; INTRAVENOUS at 10:05

## 2022-05-19 NOTE — LETTER
May 19, 2022      [facility]  [address] [facility] [place] [address]  Phone: [phone]  Fax: [phone]       Patient: Richard Soliz   YOB: 1955  Date of Visit: 05/19/2022    To Whom It May Concern:    Dariana Soliz  was at [facility] on 05/19/2022. The patient may return to work/school on 05/21/2022 with no restrictions. If you have any questions or concerns, or if I can be of further assistance, please do not hesitate to contact me.    Sincerely,    CELESTE Pacheco

## 2022-05-19 NOTE — PROGRESS NOTES
Subjective:       Patient ID: Richard Soliz is a 66 y.o. male.    Chief Complaint: Cough and Nasal Congestion (Cough,congestion,right upper back,shoulder pain.)    Pt presents with sinus symptoms x 1 week and upper right back muscle pain.    Review of Systems   Constitutional: Negative for activity change, appetite change, fatigue and fever.   HENT: Positive for nasal congestion and sinus pressure/congestion. Negative for nosebleeds, postnasal drip, rhinorrhea, sneezing and sore throat.    Eyes: Negative for pain and itching.   Respiratory: Positive for cough. Negative for chest tightness, shortness of breath, wheezing and stridor.    Cardiovascular: Negative for chest pain.   Gastrointestinal: Negative for abdominal pain.   Genitourinary: Negative for dysuria.   Musculoskeletal: Positive for back pain.   Neurological: Negative for dizziness and headaches.   Psychiatric/Behavioral: Negative for behavioral problems and confusion.         Objective:      Physical Exam  Vitals and nursing note reviewed.   Constitutional:       Appearance: Normal appearance.   HENT:      Head: Normocephalic.      Nose: Congestion present.   Eyes:      Conjunctiva/sclera: Conjunctivae normal.   Cardiovascular:      Rate and Rhythm: Normal rate and regular rhythm.      Heart sounds: Normal heart sounds.   Pulmonary:      Effort: Pulmonary effort is normal.      Breath sounds: Normal breath sounds.      Comments: Cough noted  Musculoskeletal:         General: Normal range of motion.   Neurological:      Mental Status: He is alert and oriented to person, place, and time.   Psychiatric:         Mood and Affect: Mood normal.         Behavior: Behavior normal.         Assessment:       Problem List Items Addressed This Visit        Endocrine    Diabetes mellitus without complication    Relevant Orders    Hemoglobin A1C    Microalbumin/Creatinine Ratio, Urine    CBC Auto Differential    Comprehensive Metabolic Panel    Lipid Panel    TSH       Other Visit Diagnoses     Cough    -  Primary    Relevant Orders    SARS Antigen (ANA MARIA)    Sinusitis, unspecified chronicity, unspecified location        Relevant Medications    cefTRIAXone injection 1 g (Completed)    azithromycin (Z-MAGDIEL) 250 MG tablet    promethazine-dextromethorphan (PROMETHAZINE-DM) 6.25-15 mg/5 mL Syrp    Need for hepatitis C screening test        Relevant Orders    Hepatitis C Antibody    Screening for HIV (human immunodeficiency virus)        Relevant Orders    HIV 1/2 Ag/Ab (4th Gen)    Chronic right-sided thoracic back pain        Relevant Medications    cyclobenzaprine (FLEXERIL) 10 MG tablet          Plan:       Will call pt with lab results. Notify clinic if symptoms worsen or persist.

## 2022-06-09 RX ORDER — ALBUTEROL SULFATE 90 UG/1
2 AEROSOL, METERED RESPIRATORY (INHALATION) EVERY 6 HOURS PRN
Qty: 18 G | Refills: 0 | Status: SHIPPED | OUTPATIENT
Start: 2022-06-09 | End: 2023-07-20 | Stop reason: SDUPTHER

## 2022-06-16 DIAGNOSIS — N40.0 BENIGN PROSTATIC HYPERPLASIA, UNSPECIFIED WHETHER LOWER URINARY TRACT SYMPTOMS PRESENT: ICD-10-CM

## 2022-06-16 RX ORDER — TAMSULOSIN HYDROCHLORIDE 0.4 MG/1
1 CAPSULE ORAL 2 TIMES DAILY
Qty: 180 CAPSULE | Refills: 3 | Status: SHIPPED | OUTPATIENT
Start: 2022-06-16 | End: 2023-07-20

## 2022-06-30 DIAGNOSIS — Z20.828 EXPOSURE TO SARS VIRUS: Primary | ICD-10-CM

## 2022-07-24 ENCOUNTER — OFFICE VISIT (OUTPATIENT)
Dept: FAMILY MEDICINE | Facility: CLINIC | Age: 67
End: 2022-07-24
Payer: COMMERCIAL

## 2022-07-24 VITALS
HEART RATE: 76 BPM | OXYGEN SATURATION: 95 % | WEIGHT: 189 LBS | RESPIRATION RATE: 16 BRPM | TEMPERATURE: 98 F | BODY MASS INDEX: 31.49 KG/M2 | HEIGHT: 65 IN | DIASTOLIC BLOOD PRESSURE: 79 MMHG | SYSTOLIC BLOOD PRESSURE: 136 MMHG

## 2022-07-24 DIAGNOSIS — U07.1 COVID-19: Primary | ICD-10-CM

## 2022-07-24 DIAGNOSIS — Z20.828 EXPOSURE TO VIRAL DISEASE: ICD-10-CM

## 2022-07-24 LAB
CTP QC/QA: YES
FLUAV AG NPH QL: NEGATIVE
FLUBV AG NPH QL: NEGATIVE
SARS-COV-2 AG RESP QL IA.RAPID: POSITIVE

## 2022-07-24 PROCEDURE — 3066F PR DOCUMENTATION OF TREATMENT FOR NEPHROPATHY: ICD-10-PCS | Mod: CPTII,,, | Performed by: FAMILY MEDICINE

## 2022-07-24 PROCEDURE — 1159F PR MEDICATION LIST DOCUMENTED IN MEDICAL RECORD: ICD-10-PCS | Mod: CPTII,,, | Performed by: FAMILY MEDICINE

## 2022-07-24 PROCEDURE — 3078F DIAST BP <80 MM HG: CPT | Mod: CPTII,,, | Performed by: FAMILY MEDICINE

## 2022-07-24 PROCEDURE — 3008F BODY MASS INDEX DOCD: CPT | Mod: CPTII,,, | Performed by: FAMILY MEDICINE

## 2022-07-24 PROCEDURE — 1160F RVW MEDS BY RX/DR IN RCRD: CPT | Mod: CPTII,,, | Performed by: FAMILY MEDICINE

## 2022-07-24 PROCEDURE — 3061F PR NEG MICROALBUMINURIA RESULT DOCUMENTED/REVIEW: ICD-10-PCS | Mod: CPTII,,, | Performed by: FAMILY MEDICINE

## 2022-07-24 PROCEDURE — 3052F HG A1C>EQUAL 8.0%<EQUAL 9.0%: CPT | Mod: CPTII,,, | Performed by: FAMILY MEDICINE

## 2022-07-24 PROCEDURE — 3075F SYST BP GE 130 - 139MM HG: CPT | Mod: CPTII,,, | Performed by: FAMILY MEDICINE

## 2022-07-24 PROCEDURE — 99213 PR OFFICE/OUTPT VISIT, EST, LEVL III, 20-29 MIN: ICD-10-PCS | Mod: ,,, | Performed by: FAMILY MEDICINE

## 2022-07-24 PROCEDURE — 1159F MED LIST DOCD IN RCRD: CPT | Mod: CPTII,,, | Performed by: FAMILY MEDICINE

## 2022-07-24 PROCEDURE — 3066F NEPHROPATHY DOC TX: CPT | Mod: CPTII,,, | Performed by: FAMILY MEDICINE

## 2022-07-24 PROCEDURE — 99051 MED SERV EVE/WKEND/HOLIDAY: CPT | Mod: ,,, | Performed by: FAMILY MEDICINE

## 2022-07-24 PROCEDURE — 3078F PR MOST RECENT DIASTOLIC BLOOD PRESSURE < 80 MM HG: ICD-10-PCS | Mod: CPTII,,, | Performed by: FAMILY MEDICINE

## 2022-07-24 PROCEDURE — 99051 PR MEDICAL SERVICES, EVE/WKEND/HOLIDAY: ICD-10-PCS | Mod: ,,, | Performed by: FAMILY MEDICINE

## 2022-07-24 PROCEDURE — 87428 SARSCOV & INF VIR A&B AG IA: CPT | Mod: QW,,, | Performed by: FAMILY MEDICINE

## 2022-07-24 PROCEDURE — 99213 OFFICE O/P EST LOW 20 MIN: CPT | Mod: ,,, | Performed by: FAMILY MEDICINE

## 2022-07-24 PROCEDURE — 3061F NEG MICROALBUMINURIA REV: CPT | Mod: CPTII,,, | Performed by: FAMILY MEDICINE

## 2022-07-24 PROCEDURE — 1160F PR REVIEW ALL MEDS BY PRESCRIBER/CLIN PHARMACIST DOCUMENTED: ICD-10-PCS | Mod: CPTII,,, | Performed by: FAMILY MEDICINE

## 2022-07-24 PROCEDURE — 3008F PR BODY MASS INDEX (BMI) DOCUMENTED: ICD-10-PCS | Mod: CPTII,,, | Performed by: FAMILY MEDICINE

## 2022-07-24 PROCEDURE — 87428 POCT SARS-COV2 (COVID) WITH FLU ANTIGEN: ICD-10-PCS | Mod: QW,,, | Performed by: FAMILY MEDICINE

## 2022-07-24 PROCEDURE — 3075F PR MOST RECENT SYSTOLIC BLOOD PRESS GE 130-139MM HG: ICD-10-PCS | Mod: CPTII,,, | Performed by: FAMILY MEDICINE

## 2022-07-24 PROCEDURE — 3052F PR MOST RECENT HEMOGLOBIN A1C LEVEL 8.0 - < 9.0%: ICD-10-PCS | Mod: CPTII,,, | Performed by: FAMILY MEDICINE

## 2022-07-24 RX ORDER — CETIRIZINE HYDROCHLORIDE 10 MG/1
10 TABLET ORAL DAILY
Qty: 10 TABLET | Refills: 0 | Status: SHIPPED | OUTPATIENT
Start: 2022-07-24 | End: 2022-08-03

## 2022-07-24 RX ORDER — AZITHROMYCIN 250 MG/1
TABLET, FILM COATED ORAL
Qty: 6 TABLET | Refills: 0 | Status: SHIPPED | OUTPATIENT
Start: 2022-07-24 | End: 2022-08-11

## 2022-07-24 NOTE — PROGRESS NOTES
Subjective:       Patient ID: Richard Soliz is a 66 y.o. male.    Chief Complaint: COVID-19 Concerns (St, Nasal congestion, BA, symptoms started Friday. Exposed to covid. )    HPI  Review of Systems   Constitutional: Positive for fatigue. Negative for activity change, appetite change, chills, diaphoresis, fever and unexpected weight change.   HENT: Positive for nasal congestion, postnasal drip, rhinorrhea, sinus pressure/congestion and sore throat. Negative for dental problem, drooling, ear discharge, ear pain, facial swelling, hearing loss, mouth sores, nosebleeds, sneezing, tinnitus, trouble swallowing, voice change and goiter.    Eyes: Negative for photophobia, pain, discharge, redness, itching and visual disturbance.   Respiratory: Positive for cough. Negative for apnea, choking, chest tightness, shortness of breath, wheezing and stridor.    Cardiovascular: Negative for chest pain, palpitations, leg swelling and claudication.   Gastrointestinal: Negative for abdominal distention, abdominal pain, anal bleeding, blood in stool, change in bowel habit, constipation, diarrhea, nausea, vomiting, reflux, fecal incontinence and change in bowel habit.   Endocrine: Negative for cold intolerance, heat intolerance, polydipsia, polyphagia and polyuria.   Genitourinary: Negative for bladder incontinence, decreased urine volume, difficulty urinating, discharge, dysuria, enuresis, erectile dysfunction, flank pain, frequency, genital sores, hematuria, penile pain, testicular pain and urgency.   Musculoskeletal: Negative for arthralgias, back pain, gait problem, joint swelling, leg pain, myalgias, neck pain, neck stiffness and joint deformity.   Integumentary:  Negative for pallor, rash, wound and mole/lesion.   Allergic/Immunologic: Negative for environmental allergies, food allergies and frequent infections.   Neurological: Negative for dizziness, vertigo, tremors, seizures, syncope, facial asymmetry, speech difficulty,  weakness, light-headedness, numbness, headaches, disturbances in coordination, memory loss and coordination difficulties.   Hematological: Negative for adenopathy. Does not bruise/bleed easily.   Psychiatric/Behavioral: Negative for agitation, behavioral problems, confusion, decreased concentration, dysphoric mood, hallucinations, self-injury, sleep disturbance and suicidal ideas. The patient is not nervous/anxious and is not hyperactive.          Objective:      Physical Exam  Vitals reviewed.   Constitutional:       Appearance: Normal appearance. He is normal weight.   HENT:      Head: Normocephalic and atraumatic.      Right Ear: Tympanic membrane and ear canal normal.      Left Ear: Tympanic membrane, ear canal and external ear normal.      Nose: Congestion and rhinorrhea present.      Mouth/Throat:      Mouth: Mucous membranes are moist.      Pharynx: Oropharynx is clear. Posterior oropharyngeal erythema present.   Eyes:      Extraocular Movements: Extraocular movements intact.      Conjunctiva/sclera: Conjunctivae normal.      Pupils: Pupils are equal, round, and reactive to light.   Cardiovascular:      Rate and Rhythm: Normal rate and regular rhythm.      Pulses: Normal pulses.      Heart sounds: Normal heart sounds.   Pulmonary:      Effort: Pulmonary effort is normal.      Breath sounds: Normal breath sounds.   Abdominal:      General: Abdomen is flat. Bowel sounds are normal.      Palpations: Abdomen is soft.   Musculoskeletal:         General: Normal range of motion.      Cervical back: Normal range of motion and neck supple.   Skin:     General: Skin is warm and dry.   Neurological:      General: No focal deficit present.      Mental Status: He is alert and oriented to person, place, and time. Mental status is at baseline.   Psychiatric:         Mood and Affect: Mood normal.         Behavior: Behavior normal.         Thought Content: Thought content normal.         Judgment: Judgment normal.          Assessment:       1. COVID-19    2. Exposure to viral disease        Plan:     COVID-19    Exposure to viral disease  -     POCT SARS-COV2 (COVID) with Flu Antigen    Other orders  -     azithromycin (Z-MAGDIEL) 250 MG tablet; Take 2 tablets by mouth on day 1; Take 1 tablet by mouth on days 2-5  Dispense: 6 tablet; Refill: 0  -     nirmatrelvir-ritonavir 300 mg (150 mg x 2)-100 mg copackaged tablets (EUA); Take 3 tablets by mouth 2 (two) times daily. Each dose contains 2 nirmatrelvir (pink tablets) and 1 ritonavir (white tablet). Take all 3 tablets together  Dispense: 30 tablet; Refill: 0  -     cetirizine (ZYRTEC) 10 MG tablet; Take 1 tablet (10 mg total) by mouth once daily. for 10 days  Dispense: 10 tablet; Refill: 0       Covid positive.

## 2022-07-24 NOTE — LETTER
July 24, 2022      [facility]  [address] [place] [address] [place] [address]  Phone: [phone]  Fax: [phone]       Patient: Richard Soliz   YOB: 1955  Date of Visit: 07/24/2022    To Whom It May Concern:    Dariana Soliz  was at [facility] on 07/24/2022. The patient may return to work/school on 07/28/2022 with no restrictions. If you have any questions or concerns, or if I can be of further assistance, please do not hesitate to contact me.    Sincerely,    Rui Matthews, CMA

## 2022-08-11 ENCOUNTER — OFFICE VISIT (OUTPATIENT)
Dept: PRIMARY CARE CLINIC | Facility: CLINIC | Age: 67
End: 2022-08-11
Payer: COMMERCIAL

## 2022-08-11 VITALS
BODY MASS INDEX: 31.32 KG/M2 | TEMPERATURE: 98 F | OXYGEN SATURATION: 95 % | HEART RATE: 92 BPM | HEIGHT: 65 IN | SYSTOLIC BLOOD PRESSURE: 132 MMHG | RESPIRATION RATE: 16 BRPM | WEIGHT: 188 LBS | DIASTOLIC BLOOD PRESSURE: 70 MMHG

## 2022-08-11 DIAGNOSIS — J06.9 ACUTE UPPER RESPIRATORY INFECTION: Primary | ICD-10-CM

## 2022-08-11 PROCEDURE — 3052F PR MOST RECENT HEMOGLOBIN A1C LEVEL 8.0 - < 9.0%: ICD-10-PCS | Mod: CPTII,,, | Performed by: NURSE PRACTITIONER

## 2022-08-11 PROCEDURE — 3061F PR NEG MICROALBUMINURIA RESULT DOCUMENTED/REVIEW: ICD-10-PCS | Mod: CPTII,,, | Performed by: NURSE PRACTITIONER

## 2022-08-11 PROCEDURE — 99213 PR OFFICE/OUTPT VISIT, EST, LEVL III, 20-29 MIN: ICD-10-PCS | Mod: 25,,, | Performed by: NURSE PRACTITIONER

## 2022-08-11 PROCEDURE — 1160F RVW MEDS BY RX/DR IN RCRD: CPT | Mod: CPTII,,, | Performed by: NURSE PRACTITIONER

## 2022-08-11 PROCEDURE — 1160F PR REVIEW ALL MEDS BY PRESCRIBER/CLIN PHARMACIST DOCUMENTED: ICD-10-PCS | Mod: CPTII,,, | Performed by: NURSE PRACTITIONER

## 2022-08-11 PROCEDURE — 3075F PR MOST RECENT SYSTOLIC BLOOD PRESS GE 130-139MM HG: ICD-10-PCS | Mod: CPTII,,, | Performed by: NURSE PRACTITIONER

## 2022-08-11 PROCEDURE — 3078F DIAST BP <80 MM HG: CPT | Mod: CPTII,,, | Performed by: NURSE PRACTITIONER

## 2022-08-11 PROCEDURE — 3061F NEG MICROALBUMINURIA REV: CPT | Mod: CPTII,,, | Performed by: NURSE PRACTITIONER

## 2022-08-11 PROCEDURE — 1159F MED LIST DOCD IN RCRD: CPT | Mod: CPTII,,, | Performed by: NURSE PRACTITIONER

## 2022-08-11 PROCEDURE — 3075F SYST BP GE 130 - 139MM HG: CPT | Mod: CPTII,,, | Performed by: NURSE PRACTITIONER

## 2022-08-11 PROCEDURE — 99213 OFFICE O/P EST LOW 20 MIN: CPT | Mod: 25,,, | Performed by: NURSE PRACTITIONER

## 2022-08-11 PROCEDURE — 1159F PR MEDICATION LIST DOCUMENTED IN MEDICAL RECORD: ICD-10-PCS | Mod: CPTII,,, | Performed by: NURSE PRACTITIONER

## 2022-08-11 PROCEDURE — 3066F NEPHROPATHY DOC TX: CPT | Mod: CPTII,,, | Performed by: NURSE PRACTITIONER

## 2022-08-11 PROCEDURE — 96372 PR INJECTION,THERAP/PROPH/DIAG2ST, IM OR SUBCUT: ICD-10-PCS | Mod: ,,, | Performed by: NURSE PRACTITIONER

## 2022-08-11 PROCEDURE — 3078F PR MOST RECENT DIASTOLIC BLOOD PRESSURE < 80 MM HG: ICD-10-PCS | Mod: CPTII,,, | Performed by: NURSE PRACTITIONER

## 2022-08-11 PROCEDURE — 3066F PR DOCUMENTATION OF TREATMENT FOR NEPHROPATHY: ICD-10-PCS | Mod: CPTII,,, | Performed by: NURSE PRACTITIONER

## 2022-08-11 PROCEDURE — 3052F HG A1C>EQUAL 8.0%<EQUAL 9.0%: CPT | Mod: CPTII,,, | Performed by: NURSE PRACTITIONER

## 2022-08-11 PROCEDURE — 3008F PR BODY MASS INDEX (BMI) DOCUMENTED: ICD-10-PCS | Mod: CPTII,,, | Performed by: NURSE PRACTITIONER

## 2022-08-11 PROCEDURE — 3008F BODY MASS INDEX DOCD: CPT | Mod: CPTII,,, | Performed by: NURSE PRACTITIONER

## 2022-08-11 PROCEDURE — 96372 THER/PROPH/DIAG INJ SC/IM: CPT | Mod: ,,, | Performed by: NURSE PRACTITIONER

## 2022-08-11 RX ORDER — DEXAMETHASONE SODIUM PHOSPHATE 4 MG/ML
4 INJECTION, SOLUTION INTRA-ARTICULAR; INTRALESIONAL; INTRAMUSCULAR; INTRAVENOUS; SOFT TISSUE
Status: COMPLETED | OUTPATIENT
Start: 2022-08-11 | End: 2022-08-11

## 2022-08-11 RX ORDER — AZITHROMYCIN 250 MG/1
TABLET, FILM COATED ORAL
Qty: 6 TABLET | Refills: 0 | Status: SHIPPED | OUTPATIENT
Start: 2022-08-11 | End: 2023-07-20 | Stop reason: ALTCHOICE

## 2022-08-11 RX ORDER — CEFTRIAXONE 1 G/1
1 INJECTION, POWDER, FOR SOLUTION INTRAMUSCULAR; INTRAVENOUS
Status: COMPLETED | OUTPATIENT
Start: 2022-08-11 | End: 2022-08-11

## 2022-08-11 RX ORDER — PROMETHAZINE HYDROCHLORIDE AND DEXTROMETHORPHAN HYDROBROMIDE 6.25; 15 MG/5ML; MG/5ML
5 SYRUP ORAL EVERY 4 HOURS PRN
Qty: 240 ML | Refills: 0 | Status: SHIPPED | OUTPATIENT
Start: 2022-08-11 | End: 2023-07-20 | Stop reason: ALTCHOICE

## 2022-08-11 RX ADMIN — DEXAMETHASONE SODIUM PHOSPHATE 4 MG: 4 INJECTION, SOLUTION INTRA-ARTICULAR; INTRALESIONAL; INTRAMUSCULAR; INTRAVENOUS; SOFT TISSUE at 02:08

## 2022-08-11 RX ADMIN — CEFTRIAXONE 1 G: 1 INJECTION, POWDER, FOR SOLUTION INTRAMUSCULAR; INTRAVENOUS at 02:08

## 2022-08-11 NOTE — PROGRESS NOTES
Subjective:       Patient ID: Richard Soliz is a 66 y.o. male.    Chief Complaint: Sore Throat, Sinusitis, and Cough (Started feeling bad again Tuesday.)    Pt presents with upper resp symptoms x 3 days. Pt tested positive for covid on 7/24/2022.    Sore Throat   Associated symptoms include coughing. Pertinent negatives include no abdominal pain, congestion, headaches, shortness of breath or stridor.   Sinusitis  Associated symptoms include coughing and a sore throat. Pertinent negatives include no congestion, headaches, shortness of breath, sinus pressure or sneezing.   Cough  Associated symptoms include a sore throat. Pertinent negatives include no chest pain, fever, headaches, postnasal drip, rhinorrhea, shortness of breath or wheezing.     Review of Systems   Constitutional: Negative for activity change, appetite change, fatigue and fever.   HENT: Positive for sore throat. Negative for nasal congestion, nosebleeds, postnasal drip, rhinorrhea, sinus pressure/congestion and sneezing.    Eyes: Negative for pain and itching.   Respiratory: Positive for cough. Negative for chest tightness, shortness of breath, wheezing and stridor.    Cardiovascular: Negative for chest pain.   Gastrointestinal: Negative for abdominal pain.   Genitourinary: Negative for dysuria.   Musculoskeletal: Negative for back pain.   Neurological: Negative for dizziness and headaches.   Psychiatric/Behavioral: Negative for behavioral problems and confusion.         Objective:      Physical Exam  Vitals and nursing note reviewed.   Constitutional:       Appearance: Normal appearance.   HENT:      Head: Normocephalic.      Nose: Congestion present.   Eyes:      Conjunctiva/sclera: Conjunctivae normal.      Pupils: Pupils are equal, round, and reactive to light.   Cardiovascular:      Rate and Rhythm: Normal rate and regular rhythm.      Heart sounds: Normal heart sounds.   Pulmonary:      Effort: Pulmonary effort is normal.      Breath sounds:  Normal breath sounds.   Musculoskeletal:         General: Normal range of motion.   Neurological:      Mental Status: He is alert and oriented to person, place, and time.   Psychiatric:         Mood and Affect: Mood normal.         Behavior: Behavior normal.         Assessment:       Problem List Items Addressed This Visit    None     Visit Diagnoses     Acute upper respiratory infection    -  Primary    Relevant Medications    cefTRIAXone injection 1 g (Start on 8/11/2022  2:30 PM)    dexamethasone injection 4 mg (Start on 8/11/2022  2:30 PM)    azithromycin (Z-MAGDIEL) 250 MG tablet    promethazine-dextromethorphan (PROMETHAZINE-DM) 6.25-15 mg/5 mL Syrp          Plan:       Monitor glucose closely for at least 1 week. Albuterol inh as directed. Notify clinic if symptoms worsen or persist.

## 2022-08-11 NOTE — LETTER
August 11, 2022      [facility]  [address] [place] [address] [place] [address]  Phone: [phone]  Fax: [phone]       Patient: Richard Soliz   YOB: 1955  Date of Visit: 08/11/2022    To Whom It May Concern:    Dariana Soliz  was at [facility] on 08/11/2022. The patient may return to work/school on 08/15/2022 with no restrictions. If you have any questions or concerns, or if I can be of further assistance, please do not hesitate to contact me.    Sincerely,    CELESTE Pacheco

## 2022-09-29 ENCOUNTER — TELEPHONE (OUTPATIENT)
Dept: GASTROENTEROLOGY | Facility: CLINIC | Age: 67
End: 2022-09-29
Payer: COMMERCIAL

## 2022-09-29 DIAGNOSIS — Z86.010 HX OF COLONIC POLYPS: Primary | ICD-10-CM

## 2022-10-13 DIAGNOSIS — M25.569 KNEE PAIN, UNSPECIFIED CHRONICITY, UNSPECIFIED LATERALITY: Primary | ICD-10-CM

## 2022-10-13 RX ORDER — NABUMETONE 500 MG/1
500 TABLET, FILM COATED ORAL 2 TIMES DAILY
Qty: 180 TABLET | Refills: 1 | Status: SHIPPED | OUTPATIENT
Start: 2022-10-13 | End: 2023-07-20

## 2022-10-27 DIAGNOSIS — E11.9 DIABETES MELLITUS WITHOUT COMPLICATION: Primary | ICD-10-CM

## 2022-10-27 RX ORDER — METFORMIN HYDROCHLORIDE 1000 MG/1
1000 TABLET ORAL 2 TIMES DAILY
Qty: 180 TABLET | Refills: 3 | Status: SHIPPED | OUTPATIENT
Start: 2022-10-27 | End: 2023-07-20 | Stop reason: SDUPTHER

## 2022-11-02 ENCOUNTER — ANESTHESIA EVENT (OUTPATIENT)
Dept: GASTROENTEROLOGY | Facility: HOSPITAL | Age: 67
End: 2022-11-02
Payer: COMMERCIAL

## 2022-11-02 ENCOUNTER — HOSPITAL ENCOUNTER (OUTPATIENT)
Dept: GASTROENTEROLOGY | Facility: HOSPITAL | Age: 67
Discharge: HOME | End: 2022-11-02
Attending: INTERNAL MEDICINE
Payer: COMMERCIAL

## 2022-11-02 ENCOUNTER — ANESTHESIA (OUTPATIENT)
Dept: GASTROENTEROLOGY | Facility: HOSPITAL | Age: 67
End: 2022-11-02
Payer: COMMERCIAL

## 2022-11-02 VITALS
OXYGEN SATURATION: 92 % | BODY MASS INDEX: 30.34 KG/M2 | SYSTOLIC BLOOD PRESSURE: 105 MMHG | OXYGEN SATURATION: 87 % | SYSTOLIC BLOOD PRESSURE: 107 MMHG | HEIGHT: 66 IN | DIASTOLIC BLOOD PRESSURE: 73 MMHG | HEART RATE: 77 BPM | HEART RATE: 78 BPM | RESPIRATION RATE: 19 BRPM | DIASTOLIC BLOOD PRESSURE: 72 MMHG

## 2022-11-02 DIAGNOSIS — Z86.010 HX OF COLONIC POLYPS: ICD-10-CM

## 2022-11-02 DIAGNOSIS — D12.3 ADENOMATOUS POLYP OF TRANSVERSE COLON: ICD-10-CM

## 2022-11-02 DIAGNOSIS — D12.4 ADENOMATOUS POLYP OF DESCENDING COLON: ICD-10-CM

## 2022-11-02 DIAGNOSIS — Z12.11 SCREENING FOR MALIGNANT NEOPLASM OF COLON: Primary | ICD-10-CM

## 2022-11-02 DIAGNOSIS — K57.30 DIVERTICULOSIS OF COLON: ICD-10-CM

## 2022-11-02 PROBLEM — Z86.0100 HX OF COLONIC POLYPS: Status: ACTIVE | Noted: 2022-11-02

## 2022-11-02 LAB — GLUCOSE SERPL-MCNC: 202 MG/DL (ref 70–105)

## 2022-11-02 PROCEDURE — 88305 TISSUE EXAM BY PATHOLOGIST: CPT | Mod: 26,,, | Performed by: PATHOLOGY

## 2022-11-02 PROCEDURE — D9220A PRA ANESTHESIA: ICD-10-PCS | Mod: 33,,, | Performed by: NURSE ANESTHETIST, CERTIFIED REGISTERED

## 2022-11-02 PROCEDURE — D9220A PRA ANESTHESIA: Mod: 33,,, | Performed by: NURSE ANESTHETIST, CERTIFIED REGISTERED

## 2022-11-02 PROCEDURE — 82962 GLUCOSE BLOOD TEST: CPT

## 2022-11-02 PROCEDURE — 37000009 HC ANESTHESIA EA ADD 15 MINS

## 2022-11-02 PROCEDURE — 27000716 HC OXISENSOR PROBE, ANY SIZE: Performed by: NURSE ANESTHETIST, CERTIFIED REGISTERED

## 2022-11-02 PROCEDURE — 88305 TISSUE EXAM BY PATHOLOGIST: CPT | Mod: SUR | Performed by: INTERNAL MEDICINE

## 2022-11-02 PROCEDURE — 88305 SURGICAL PATHOLOGY: ICD-10-PCS | Mod: 26,,, | Performed by: PATHOLOGY

## 2022-11-02 PROCEDURE — 63600175 PHARM REV CODE 636 W HCPCS: Performed by: NURSE ANESTHETIST, CERTIFIED REGISTERED

## 2022-11-02 PROCEDURE — 25000003 PHARM REV CODE 250: Performed by: NURSE ANESTHETIST, CERTIFIED REGISTERED

## 2022-11-02 PROCEDURE — 45385 COLONOSCOPY W/LESION REMOVAL: CPT

## 2022-11-02 PROCEDURE — 45380 COLONOSCOPY AND BIOPSY: CPT

## 2022-11-02 PROCEDURE — 27000284 HC CANNULA NASAL: Performed by: NURSE ANESTHETIST, CERTIFIED REGISTERED

## 2022-11-02 PROCEDURE — 37000008 HC ANESTHESIA 1ST 15 MINUTES

## 2022-11-02 RX ORDER — PROPOFOL 10 MG/ML
VIAL (ML) INTRAVENOUS
Status: DISCONTINUED | OUTPATIENT
Start: 2022-11-02 | End: 2022-11-02

## 2022-11-02 RX ORDER — LIDOCAINE HYDROCHLORIDE 20 MG/ML
INJECTION, SOLUTION EPIDURAL; INFILTRATION; INTRACAUDAL; PERINEURAL
Status: DISCONTINUED | OUTPATIENT
Start: 2022-11-02 | End: 2022-11-02

## 2022-11-02 RX ADMIN — PROPOFOL 40 MG: 10 INJECTION, EMULSION INTRAVENOUS at 12:11

## 2022-11-02 RX ADMIN — SODIUM CHLORIDE: 9 INJECTION, SOLUTION INTRAVENOUS at 11:11

## 2022-11-02 RX ADMIN — PROPOFOL 65 MG: 10 INJECTION, EMULSION INTRAVENOUS at 12:11

## 2022-11-02 RX ADMIN — LIDOCAINE HYDROCHLORIDE 60 MG: 20 INJECTION, SOLUTION INTRAVENOUS at 12:11

## 2022-11-02 RX ADMIN — PROPOFOL 45 MG: 10 INJECTION, EMULSION INTRAVENOUS at 12:11

## 2022-11-02 NOTE — ANESTHESIA PREPROCEDURE EVALUATION
11/02/2022  Richard Soliz is a 66 y.o., male.    Past Medical History:   Diagnosis Date    Acquired asymmetry of prostate     Acute prostatitis 10/29/2021    Allergic rhinitis     Asthma     BPH with obstruction/lower urinary tract symptoms 10/29/2021    flomax [facility]     Community acquired pneumonia of left lower lobe of lung 2018    COPD (chronic obstructive pulmonary disease)     Essential hypertension     GERD (gastroesophageal reflux disease)     Hyperlipidemia     Personal history of colonic polyps 01/16/2017    repeat colonoscopy in 3 years    Type 2 diabetes mellitus     Vitamin D deficiency        Past Surgical History:   Procedure Laterality Date    COLONOSCOPY W/ BIOPSIES  01/16/2017    repeat in 3 years    nose surgery for broken nose as a child      right carpal tunnel surgery  03/2021    TONSILLECTOMY AND ADENOIDECTOMY         Family History   Problem Relation Age of Onset    Diabetes Mother     Hyperlipidemia Mother     Hypertension Mother     Diabetes Father     Heart failure Father     Hypertension Father     Cancer Father     Multiple sclerosis Sister     Hypertension Brother     Nephrolithiasis Brother        Social History     Socioeconomic History    Marital status:      Spouse name: cecelia    Number of children: 1    Years of education: 12    Highest education level: 12th grade   Occupational History    Occupation:    Tobacco Use    Smoking status: Former    Smokeless tobacco: Never    Tobacco comments:     quit smoking 25 years ago   Substance and Sexual Activity    Alcohol use: Not Currently    Drug use: Never    Sexual activity: Not Currently       Current Outpatient Medications   Medication Sig Dispense Refill    albuterol (PROVENTIL HFA) 90 mcg/actuation inhaler Inhale 2 puffs into the lungs every 6 (six) hours as needed for  Wheezing. Rescue 18 g 0    azithromycin (Z-MAGDIEL) 250 MG tablet 2 tablets on day 1 and 1 tablet on days 2-5 6 tablet 0    C,E,zinc,copper 11-omega3s-lut (OCUVITE ADULT 50 PLUS) 250-5-1 mg Cap Take 1 tablet by mouth once daily.      cetirizine (ZYRTEC) 10 MG tablet TAKE ONE (1) TABLET  BY MOUTH ONCE DAILY. 90 tablet 3    cinnamon bark-chromium picolin 500-100 mg-mcg Cap Take 1 capsule by mouth once daily.      citalopram (CELEXA) 40 MG tablet Take 1 tablet (40 mg total) by mouth once daily. 90 tablet 3    FARXIGA 10 mg tablet Take 1 tablet by mouth once daily.      finasteride (PROSCAR) 5 mg tablet Take 1 tablet (5 mg total) by mouth once daily. 90 tablet 3    metFORMIN (GLUCOPHAGE) 1000 MG tablet Take 1 tablet (1,000 mg total) by mouth 2 (two) times a day. 180 tablet 3    montelukast (SINGULAIR) 10 mg tablet Take 1 tablet (10 mg total) by mouth once daily. 90 tablet 3    nabumetone (RELAFEN) 500 MG tablet Take 1 tablet (500 mg total) by mouth 2 (two) times daily. 180 tablet 1    promethazine-dextromethorphan (PROMETHAZINE-DM) 6.25-15 mg/5 mL Syrp Take 5 mLs by mouth every 4 (four) hours as needed (cough). 240 mL 0    tamsulosin (FLOMAX) 0.4 mg Cap Take 1 capsule (0.4 mg total) by mouth 2 (two) times a day. 180 capsule 3     No current facility-administered medications for this encounter.       Review of patient's allergies indicates:  No Known Allergies    Pre-op Assessment    I have reviewed the Patient Summary Reports.     I have reviewed the Nursing Notes. I have reviewed the NPO Status.   I have reviewed the Medications.     Review of Systems  Anesthesia Hx:  No problems with previous Anesthesia  Denies Family Hx of Anesthesia complications.   Denies Personal Hx of Anesthesia complications.   Hematology/Oncology:     Oncology Normal     EENT/Dental:EENT/Dental Normal   Cardiovascular:   Hypertension hyperlipidemia    Pulmonary:   Pneumonia COPD Asthma    Renal/:  Renal/ Normal     Hepatic/GI:    GERD    Musculoskeletal:  Musculoskeletal Normal    Neurological:  Neurology Normal    Endocrine:   Diabetes, type 2    Dermatological:  Skin Normal    Psych:  Psychiatric Normal           Physical Exam  General: Well nourished, Alert, Oriented and Cooperative    Airway:  Mouth Opening: Normal  Neck ROM: Normal ROM    Chest/Lungs:  Normal Respiratory Rate    Heart:  Rate: Normal        Anesthesia Plan  Type of Anesthesia, risks & benefits discussed:    Anesthesia Type: Gen Natural Airway, MAC  Intra-op Monitoring Plan: Standard ASA Monitors  Post Op Pain Control Plan: multimodal analgesia and IV/PO Opioids PRN  Induction:  IV  Informed Consent: Informed consent signed with the Patient and all parties understand the risks and agree with anesthesia plan.  All questions answered. Patient consented to blood products? Yes  ASA Score: 3  Day of Surgery Review of History & Physical: I have interviewed and examined the patient. I have reviewed the patient's H&P dated: There are no significant changes.     Ready For Surgery From Anesthesia Perspective.     .

## 2022-11-02 NOTE — DISCHARGE INSTRUCTIONS
Procedure Date  11/2/22     Impression  Overall Impression: Diverticula were noted in the sigmoid and descending colon. Polyps were removed from the transverse colon (3) and descending colon (1).     Recommendation    Await pathology results     Repeat colonoscopy in 3 years      High fiber diet.  Disp: DC to home in stable condition. Resume diet and activity. No driving x 24 hours . F/U with PCP as scheduled. Dx: History of colon polyps, colon diverticulosis, colon polyps : transverse and descending colon.     Indication  Hx of colonic polyps

## 2022-11-02 NOTE — H&P
[facility]  Gastroenterology  H&P    Patient Name: Richard Soliz  MRN: [id number]  Admission Date: 11/2/2022  Code Status: No Order    Attending Provider: Ismael Hutchinson MD   Primary Care Physician: CELESTE Pacheco  Principal Problem:<principal problem not specified>    Subjective:     History of Present Illness: Pt has history of ta colon polyp. His last colonoscopy was five years ago.    Past Medical History:   Diagnosis Date    Acquired asymmetry of prostate     Acute prostatitis 10/29/2021    Allergic rhinitis     Asthma     BPH with obstruction/lower urinary tract symptoms 10/29/2021    flomax [facility]     Community acquired pneumonia of left lower lobe of lung 2018    COPD (chronic obstructive pulmonary disease)     Essential hypertension     GERD (gastroesophageal reflux disease)     Hyperlipidemia     Personal history of colonic polyps 01/16/2017    repeat colonoscopy in 3 years    Type 2 diabetes mellitus     Vitamin D deficiency        Past Surgical History:   Procedure Laterality Date    COLONOSCOPY W/ BIOPSIES  01/16/2017    repeat in 3 years    nose surgery for broken nose as a child      right carpal tunnel surgery  03/2021    TONSILLECTOMY AND ADENOIDECTOMY         Review of patient's allergies indicates:  No Known Allergies  Family History       Problem Relation (Age of Onset)    Cancer Father    Diabetes Mother, Father    Heart failure Father    Hyperlipidemia Mother    Hypertension Mother, Father, Brother    Multiple sclerosis Sister    Nephrolithiasis Brother          Tobacco Use    Smoking status: Former    Smokeless tobacco: Never    Tobacco comments:     quit smoking 25 years ago   Substance and Sexual Activity    Alcohol use: Not Currently    Drug use: Never    Sexual activity: Not Currently     Review of Systems   Respiratory: Negative.     Cardiovascular: Negative.    Gastrointestinal: Negative.    Objective:     Vital Signs (Most Recent):  Pulse: 82 (11/02/22 1124)  Resp: 13  (11/02/22 1124)  BP: 115/70 (11/02/22 1124)  SpO2: (!) 93 % (11/02/22 1124)   Vital Signs (24h Range):  Pulse:  [82] 82  Resp:  [13] 13  SpO2:  [93 %] 93 %  BP: (115)/(70) 115/70        Body mass index is 30.34 kg/m².    No intake or output data in the 24 hours ending 11/02/22 1205    Lines/Drains/Airways       Peripheral Intravenous Line  Duration                  Peripheral IV - Single Lumen 11/02/22 1132 22 G Posterior;Right Hand <1 day                    Physical Exam  Vitals reviewed.   Constitutional:       General: He is not in acute distress.     Appearance: Normal appearance. He is well-developed. He is not ill-appearing.   HENT:      Head: Normocephalic and atraumatic.      Nose: Nose normal.   Eyes:      Pupils: Pupils are equal, round, and reactive to light.   Cardiovascular:      Rate and Rhythm: Normal rate and regular rhythm.   Pulmonary:      Effort: Pulmonary effort is normal.      Breath sounds: Normal breath sounds. No wheezing.   Abdominal:      General: Abdomen is flat. Bowel sounds are normal. There is no distension.      Palpations: Abdomen is soft.      Tenderness: There is no abdominal tenderness. There is no guarding.   Skin:     General: Skin is warm and dry.      Coloration: Skin is not jaundiced.   Neurological:      Mental Status: He is alert.   Psychiatric:         Attention and Perception: Attention normal.         Mood and Affect: Affect normal.         Speech: Speech normal.         Behavior: Behavior is cooperative.      Comments: Pt was calm while speaking.       Significant Labs:  CBC: No results for input(s): WBC, HGB, HCT, PLT in the last 48 hours.  CMP: No results for input(s): GLU, CALCIUM, ALBUMIN, PROT, NA, K, CO2, CL, BUN, CREATININE, ALKPHOS, ALT, AST, BILITOT in the last 48 hours.    Significant Imaging:  Imaging results within the past 24 hours have been reviewed.    Assessment/Plan:     There are no hospital problems to display for this patient.        Imp: History of  colon polyp  Plan: colonoscopy    Ismael Hutchinson MD  Gastroenterology  [facility] End[facility]

## 2022-11-02 NOTE — TRANSFER OF CARE
"Anesthesia Transfer of Care Note    Patient: Richard Soliz    Procedure(s) Performed: * No procedures listed *    Patient location: GI    Anesthesia Type: general    Transport from OR: Transported from OR on room air with adequate spontaneous ventilation. Continuous ECG monitoring in transport. Continuous SpO2 monitoring in transport    Post pain: adequate analgesia    Post assessment: no apparent anesthetic complications    Post vital signs: stable    Level of consciousness: sedated and responds to stimulation    Nausea/Vomiting: no nausea/vomiting    Complications: none    Transfer of care protocol was followedComments: Good SV continue, NAD, VSS, RTRN      Last vitals:   Visit Vitals  /70 (Patient Position: Lying)   Pulse 82   Resp 13   Ht 5' 6" (1.676 m)   SpO2 (!) 93%   BMI 30.34 kg/m²     "

## 2022-11-02 NOTE — ANESTHESIA POSTPROCEDURE EVALUATION
Anesthesia Post Evaluation    Patient: Richard Soliz    Procedure(s) Performed: * No procedures listed *    Final Anesthesia Type: general      Patient location during evaluation: GI PACU  Patient participation: Yes- Able to Participate  Level of consciousness: awake and alert  Post-procedure vital signs: reviewed and stable  Pain management: adequate  Airway patency: patent    PONV status at discharge: No PONV  Anesthetic complications: no      Cardiovascular status: blood pressure returned to baseline and hemodynamically stable  Respiratory status: spontaneous ventilation  Hydration status: euvolemic  Follow-up not needed.  Comments: Pt voices appreciation for care          Vitals Value Taken Time   /72 11/02/22 1237   Temp 97 F 11/02/22 1427   Pulse 67 11/02/22 1240   Resp 19 11/02/22 1232   SpO2 95 % 11/02/22 1240   Vitals shown include unvalidated device data.      Event Time   Out of Recovery 13:07:44         Pain/Roberto Score: Roberto Score: 9 (11/2/2022 12:31 PM)

## 2022-11-03 LAB
ESTROGEN SERPL-MCNC: NORMAL PG/ML
INSULIN SERPL-ACNC: NORMAL U[IU]/ML
LAB AP GROSS DESCRIPTION: NORMAL
LAB AP LABORATORY NOTES: NORMAL
T3RU NFR SERPL: NORMAL %

## 2022-11-07 ENCOUNTER — TELEPHONE (OUTPATIENT)
Dept: GASTROENTEROLOGY | Facility: CLINIC | Age: 67
End: 2022-11-07
Payer: COMMERCIAL

## 2022-11-07 NOTE — TELEPHONE ENCOUNTER
Your colonoscopy pathology results show NO cancer or infections. Dr. Hutchinson removed a few polyps, so he recommends repeating your colonoscopy in 3 years. Please continue to follow recommendations given after your colonoscopy and call our office with any questions or concerns.      ----- Message from Ismael Hutchinson MD sent at 11/3/2022  4:52 PM CDT -----  Place pt on list for colonoscopy in 3 years; polyps were ta.

## 2022-11-28 DIAGNOSIS — F32.A DEPRESSION, UNSPECIFIED DEPRESSION TYPE: ICD-10-CM

## 2022-11-28 DIAGNOSIS — J30.2 SEASONAL ALLERGIC RHINITIS, UNSPECIFIED TRIGGER: ICD-10-CM

## 2022-11-28 RX ORDER — MONTELUKAST SODIUM 10 MG/1
10 TABLET ORAL DAILY
Qty: 90 TABLET | Refills: 3 | Status: SHIPPED | OUTPATIENT
Start: 2022-11-28 | End: 2023-07-20 | Stop reason: SDUPTHER

## 2022-11-28 RX ORDER — CITALOPRAM 40 MG/1
40 TABLET, FILM COATED ORAL DAILY
Qty: 90 TABLET | Refills: 3 | Status: SHIPPED | OUTPATIENT
Start: 2022-11-28 | End: 2023-07-20 | Stop reason: DRUGHIGH

## 2022-12-19 DIAGNOSIS — N13.8 BPH WITH OBSTRUCTION/LOWER URINARY TRACT SYMPTOMS: ICD-10-CM

## 2022-12-19 DIAGNOSIS — N40.1 BPH WITH OBSTRUCTION/LOWER URINARY TRACT SYMPTOMS: ICD-10-CM

## 2022-12-19 RX ORDER — NEOMYCIN SULFATE, POLYMYXIN B SULFATE AND HYDROCORTISONE 10; 3.5; 1 MG/ML; MG/ML; [USP'U]/ML
3 SUSPENSION/ DROPS AURICULAR (OTIC) 4 TIMES DAILY
Qty: 10 ML | Refills: 0 | Status: SHIPPED | OUTPATIENT
Start: 2022-12-19 | End: 2023-07-20 | Stop reason: ALTCHOICE

## 2022-12-19 RX ORDER — FINASTERIDE 5 MG/1
5 TABLET, FILM COATED ORAL DAILY
Qty: 90 TABLET | Refills: 3 | Status: SHIPPED | OUTPATIENT
Start: 2022-12-19 | End: 2023-07-20 | Stop reason: SDUPTHER

## 2023-07-20 ENCOUNTER — OFFICE VISIT (OUTPATIENT)
Dept: PRIMARY CARE CLINIC | Facility: CLINIC | Age: 68
End: 2023-07-20
Payer: MEDICARE

## 2023-07-20 ENCOUNTER — PATIENT MESSAGE (OUTPATIENT)
Dept: ADMINISTRATIVE | Facility: HOSPITAL | Age: 68
End: 2023-07-20

## 2023-07-20 VITALS
BODY MASS INDEX: 30.86 KG/M2 | SYSTOLIC BLOOD PRESSURE: 128 MMHG | HEART RATE: 92 BPM | OXYGEN SATURATION: 93 % | DIASTOLIC BLOOD PRESSURE: 70 MMHG | HEIGHT: 66 IN | RESPIRATION RATE: 16 BRPM | TEMPERATURE: 98 F | WEIGHT: 192 LBS

## 2023-07-20 DIAGNOSIS — N13.8 BPH WITH OBSTRUCTION/LOWER URINARY TRACT SYMPTOMS: ICD-10-CM

## 2023-07-20 DIAGNOSIS — E78.5 HYPERLIPIDEMIA, UNSPECIFIED HYPERLIPIDEMIA TYPE: ICD-10-CM

## 2023-07-20 DIAGNOSIS — R74.8 ABNORMAL LIVER ENZYMES: Primary | ICD-10-CM

## 2023-07-20 DIAGNOSIS — E11.9 DIABETES MELLITUS WITHOUT COMPLICATION: Primary | ICD-10-CM

## 2023-07-20 DIAGNOSIS — Z12.5 SCREENING FOR MALIGNANT NEOPLASM OF PROSTATE: ICD-10-CM

## 2023-07-20 DIAGNOSIS — F32.A DEPRESSION, UNSPECIFIED DEPRESSION TYPE: ICD-10-CM

## 2023-07-20 DIAGNOSIS — N40.1 BPH WITH OBSTRUCTION/LOWER URINARY TRACT SYMPTOMS: ICD-10-CM

## 2023-07-20 DIAGNOSIS — J30.2 SEASONAL ALLERGIC RHINITIS, UNSPECIFIED TRIGGER: ICD-10-CM

## 2023-07-20 PROCEDURE — 99214 OFFICE O/P EST MOD 30 MIN: CPT | Mod: ,,, | Performed by: NURSE PRACTITIONER

## 2023-07-20 PROCEDURE — 99214 PR OFFICE/OUTPT VISIT, EST, LEVL IV, 30-39 MIN: ICD-10-PCS | Mod: ,,, | Performed by: NURSE PRACTITIONER

## 2023-07-20 RX ORDER — MONTELUKAST SODIUM 10 MG/1
10 TABLET ORAL DAILY
Qty: 90 TABLET | Refills: 3 | Status: SHIPPED | OUTPATIENT
Start: 2023-07-20 | End: 2024-01-11 | Stop reason: SDUPTHER

## 2023-07-20 RX ORDER — FINASTERIDE 5 MG/1
5 TABLET, FILM COATED ORAL DAILY
Qty: 90 TABLET | Refills: 3 | Status: SHIPPED | OUTPATIENT
Start: 2023-07-20 | End: 2024-01-11 | Stop reason: SDUPTHER

## 2023-07-20 RX ORDER — ROSUVASTATIN CALCIUM 10 MG/1
10 TABLET, COATED ORAL NIGHTLY
Qty: 90 TABLET | Refills: 3 | Status: SHIPPED | OUTPATIENT
Start: 2023-07-20

## 2023-07-20 RX ORDER — METFORMIN HYDROCHLORIDE 1000 MG/1
1000 TABLET ORAL 2 TIMES DAILY
Qty: 180 TABLET | Refills: 3 | Status: SHIPPED | OUTPATIENT
Start: 2023-07-20 | End: 2023-12-13 | Stop reason: SDUPTHER

## 2023-07-20 RX ORDER — ALBUTEROL SULFATE 90 UG/1
2 AEROSOL, METERED RESPIRATORY (INHALATION) EVERY 6 HOURS PRN
Qty: 18 G | Refills: 3 | Status: SHIPPED | OUTPATIENT
Start: 2023-07-20 | End: 2024-07-19

## 2023-07-20 RX ORDER — CITALOPRAM 20 MG/1
20 TABLET, FILM COATED ORAL DAILY
Qty: 90 TABLET | Refills: 3 | Status: SHIPPED | OUTPATIENT
Start: 2023-07-20 | End: 2023-12-13 | Stop reason: SDUPTHER

## 2023-07-20 NOTE — PROGRESS NOTES
Subjective     Patient ID: Richard Soliz is a 67 y.o. male.    Chief Complaint: Annual Exam    Pt presents for a wellness visit. Pt is refusing an abdominal aortic ultrasound at this time.  Protective Sensation (w/ 10 gram monofilament):  Right: Intact  Left: Intact    Visual Inspection:  Normal -  Bilateral    Pedal Pulses:   Right: Present  Left: Present    Posterior Tibialis Pulses:   Right:Present  Left: Present     Review of Systems   Constitutional:  Negative for activity change, appetite change, fatigue and fever.   HENT:  Negative for nasal congestion, nosebleeds, postnasal drip, rhinorrhea, sinus pressure/congestion, sneezing and sore throat.    Eyes:  Negative for pain and itching.   Respiratory:  Negative for cough, chest tightness, shortness of breath, wheezing and stridor.    Cardiovascular:  Negative for chest pain.   Gastrointestinal:  Negative for abdominal pain.   Genitourinary:  Negative for dysuria.   Musculoskeletal:  Negative for back pain.   Neurological:  Negative for dizziness and headaches.   Psychiatric/Behavioral:  Negative for behavioral problems and confusion.         Objective     Physical Exam  Vitals and nursing note reviewed.   Constitutional:       Appearance: Normal appearance.   Cardiovascular:      Rate and Rhythm: Normal rate and regular rhythm.      Heart sounds: Normal heart sounds.   Pulmonary:      Effort: Pulmonary effort is normal.      Breath sounds: Normal breath sounds.   Musculoskeletal:         General: Normal range of motion.   Feet:      Right foot:      Protective Sensation: 10 sites tested.  10 sites sensed.      Left foot:      Protective Sensation: 10 sites tested.  10 sites sensed.   Neurological:      Mental Status: He is alert and oriented to person, place, and time.   Psychiatric:         Mood and Affect: Mood normal.         Behavior: Behavior normal.        Assessment and Plan     1. Diabetes mellitus without complication  -     empagliflozin (JARDIANCE)  10 mg tablet; Take 1 tablet (10 mg total) by mouth once daily.  Dispense: 90 tablet; Refill: 3  -     metFORMIN (GLUCOPHAGE) 1000 MG tablet; Take 1 tablet (1,000 mg total) by mouth 2 (two) times a day.  Dispense: 180 tablet; Refill: 3  -     Hemoglobin A1C; Future; Expected date: 07/20/2023  -     Microalbumin/Creatinine Ratio, Urine; Future; Expected date: 07/20/2023  -     CBC Auto Differential; Future; Expected date: 07/20/2023  -     Comprehensive Metabolic Panel; Future; Expected date: 07/20/2023  -     Lipid Panel; Future; Expected date: 07/20/2023  -     TSH; Future; Expected date: 07/20/2023    2. Depression, unspecified depression type  -     citalopram (CELEXA) 20 MG tablet; Take 1 tablet (20 mg total) by mouth once daily.  Dispense: 90 tablet; Refill: 3    3. BPH with obstruction/lower urinary tract symptoms  Overview:  flomax qhs, proscar started last visit    Orders:  -     finasteride (PROSCAR) 5 mg tablet; Take 1 tablet (5 mg total) by mouth once daily.  Dispense: 90 tablet; Refill: 3    4. Seasonal allergic rhinitis, unspecified trigger  -     montelukast (SINGULAIR) 10 mg tablet; Take 1 tablet (10 mg total) by mouth once daily.  Dispense: 90 tablet; Refill: 3    5. Screening for malignant neoplasm of prostate  -     PSA, Screening; Future; Expected date: 07/20/2023    Other orders  -     albuterol (PROVENTIL HFA) 90 mcg/actuation inhaler; Inhale 2 puffs into the lungs every 6 (six) hours as needed for Wheezing. Rescue  Dispense: 18 g; Refill: 3        Decreased celexa dose due to age. Will call pt with lab results. Pt states he will notify clinic if he wants further treatment for low back pain. Will obtain last eye exam from Dr. Anthony.         Follow up in about 6 months (around 1/20/2024).

## 2023-08-09 DIAGNOSIS — Z71.89 COMPLEX CARE COORDINATION: ICD-10-CM

## 2023-10-04 ENCOUNTER — CLINICAL SUPPORT (OUTPATIENT)
Dept: PRIMARY CARE CLINIC | Facility: CLINIC | Age: 68
End: 2023-10-04
Payer: MEDICARE

## 2023-10-04 DIAGNOSIS — Z23 NEED FOR VACCINATION: Primary | ICD-10-CM

## 2023-10-04 PROCEDURE — 90662 FLU VACCINE - HIGH DOSE (65+) PRESERVATIVE FREE IM: ICD-10-PCS | Mod: ,,, | Performed by: NURSE PRACTITIONER

## 2023-10-04 PROCEDURE — G0008 FLU VACCINE - HIGH DOSE (65+) PRESERVATIVE FREE IM: ICD-10-PCS | Mod: ,,, | Performed by: NURSE PRACTITIONER

## 2023-10-04 PROCEDURE — 90662 IIV NO PRSV INCREASED AG IM: CPT | Mod: ,,, | Performed by: NURSE PRACTITIONER

## 2023-10-04 PROCEDURE — G0009 ADMIN PNEUMOCOCCAL VACCINE: HCPCS | Mod: ,,, | Performed by: NURSE PRACTITIONER

## 2023-10-04 PROCEDURE — 90677 PCV20 VACCINE IM: CPT | Mod: ,,, | Performed by: NURSE PRACTITIONER

## 2023-10-04 PROCEDURE — 90677 PNEUMOCOCCAL CONJUGATE VACCINE 20-VALENT: ICD-10-PCS | Mod: ,,, | Performed by: NURSE PRACTITIONER

## 2023-10-04 PROCEDURE — G0008 ADMIN INFLUENZA VIRUS VAC: HCPCS | Mod: ,,, | Performed by: NURSE PRACTITIONER

## 2023-10-04 PROCEDURE — G0009 PNEUMOCOCCAL CONJUGATE VACCINE 20-VALENT: ICD-10-PCS | Mod: ,,, | Performed by: NURSE PRACTITIONER

## 2023-10-04 NOTE — PROGRESS NOTES
Patient in for flu and pneumonia vaccine.hi-dose flu given in left deltoid.prevnar 20 given in right deltoid. Tolerated well.

## 2023-11-06 DIAGNOSIS — M25.512 LEFT SHOULDER PAIN, UNSPECIFIED CHRONICITY: Primary | ICD-10-CM

## 2023-11-26 ENCOUNTER — OFFICE VISIT (OUTPATIENT)
Dept: FAMILY MEDICINE | Facility: CLINIC | Age: 68
End: 2023-11-26
Payer: MEDICARE

## 2023-11-26 VITALS
HEIGHT: 66 IN | WEIGHT: 186.63 LBS | TEMPERATURE: 98 F | OXYGEN SATURATION: 94 % | DIASTOLIC BLOOD PRESSURE: 79 MMHG | SYSTOLIC BLOOD PRESSURE: 127 MMHG | BODY MASS INDEX: 29.99 KG/M2 | HEART RATE: 95 BPM

## 2023-11-26 DIAGNOSIS — J32.9 SINUSITIS, UNSPECIFIED CHRONICITY, UNSPECIFIED LOCATION: Primary | ICD-10-CM

## 2023-11-26 DIAGNOSIS — R50.9 FEVER, UNSPECIFIED FEVER CAUSE: ICD-10-CM

## 2023-11-26 DIAGNOSIS — M62.838 MUSCLE SPASM: ICD-10-CM

## 2023-11-26 LAB
CTP QC/QA: YES
CTP QC/QA: YES
FLUAV AG NPH QL: NEGATIVE
FLUBV AG NPH QL: NEGATIVE
SARS-COV-2 RDRP RESP QL NAA+PROBE: NEGATIVE

## 2023-11-26 PROCEDURE — 96372 PR INJECTION,THERAP/PROPH/DIAG2ST, IM OR SUBCUT: ICD-10-PCS | Mod: ,,, | Performed by: FAMILY MEDICINE

## 2023-11-26 PROCEDURE — 99213 PR OFFICE/OUTPT VISIT, EST, LEVL III, 20-29 MIN: ICD-10-PCS | Mod: ,,, | Performed by: FAMILY MEDICINE

## 2023-11-26 PROCEDURE — 87804 INFLUENZA ASSAY W/OPTIC: CPT | Mod: RHCUB | Performed by: FAMILY MEDICINE

## 2023-11-26 PROCEDURE — 87635 SARS-COV-2 COVID-19 AMP PRB: CPT | Mod: RHCUB | Performed by: FAMILY MEDICINE

## 2023-11-26 PROCEDURE — 96372 THER/PROPH/DIAG INJ SC/IM: CPT | Mod: ,,, | Performed by: FAMILY MEDICINE

## 2023-11-26 PROCEDURE — 99213 OFFICE O/P EST LOW 20 MIN: CPT | Mod: ,,, | Performed by: FAMILY MEDICINE

## 2023-11-26 RX ORDER — IBUPROFEN 600 MG/1
600 TABLET ORAL EVERY 6 HOURS PRN
Qty: 20 TABLET | Refills: 0 | Status: SHIPPED | OUTPATIENT
Start: 2023-11-26 | End: 2024-01-22

## 2023-11-26 RX ORDER — TIZANIDINE 4 MG/1
4 TABLET ORAL 3 TIMES DAILY PRN
Qty: 20 TABLET | Refills: 0 | Status: SHIPPED | OUTPATIENT
Start: 2023-11-26 | End: 2023-12-06

## 2023-11-26 RX ORDER — KETOROLAC TROMETHAMINE 30 MG/ML
30 INJECTION, SOLUTION INTRAMUSCULAR; INTRAVENOUS
Status: COMPLETED | OUTPATIENT
Start: 2023-11-26 | End: 2023-11-26

## 2023-11-26 RX ORDER — AMOXICILLIN AND CLAVULANATE POTASSIUM 875; 125 MG/1; MG/1
1 TABLET, FILM COATED ORAL 2 TIMES DAILY
Qty: 14 TABLET | Refills: 0 | Status: SHIPPED | OUTPATIENT
Start: 2023-11-26 | End: 2023-12-03

## 2023-11-26 RX ORDER — PREDNISONE 10 MG/1
10 TABLET ORAL DAILY
Qty: 5 TABLET | Refills: 0 | Status: SHIPPED | OUTPATIENT
Start: 2023-11-26 | End: 2023-12-01

## 2023-11-26 RX ORDER — CEFTRIAXONE 500 MG/1
500 INJECTION, POWDER, FOR SOLUTION INTRAMUSCULAR; INTRAVENOUS
Status: COMPLETED | OUTPATIENT
Start: 2023-11-26 | End: 2023-11-26

## 2023-11-26 RX ADMIN — KETOROLAC TROMETHAMINE 30 MG: 30 INJECTION, SOLUTION INTRAMUSCULAR; INTRAVENOUS at 02:11

## 2023-11-26 RX ADMIN — CEFTRIAXONE 500 MG: 500 INJECTION, POWDER, FOR SOLUTION INTRAMUSCULAR; INTRAVENOUS at 02:11

## 2023-11-26 NOTE — PROGRESS NOTES
Subjective:       Patient ID: Richard Soliz is a 68 y.o. male.    Chief Complaint: Fever (Pt complains of chills and fever x 1 day.)    Fever   Associated symptoms include congestion. Pertinent negatives include no abdominal pain, chest pain, coughing, diarrhea, ear pain, headaches, nausea, rash, sore throat, urinary pain, vomiting or wheezing.     Review of Systems   Constitutional:  Positive for fever. Negative for activity change, appetite change, chills, diaphoresis, fatigue and unexpected weight change.   HENT:  Positive for nasal congestion, postnasal drip, rhinorrhea and sinus pressure/congestion. Negative for dental problem, drooling, ear discharge, ear pain, facial swelling, hearing loss, mouth sores, nosebleeds, sneezing, sore throat, tinnitus, trouble swallowing, voice change and goiter.    Eyes:  Negative for photophobia, pain, discharge, redness, itching and visual disturbance.   Respiratory:  Negative for apnea, cough, choking, chest tightness, shortness of breath, wheezing and stridor.    Cardiovascular:  Negative for chest pain, palpitations, leg swelling and claudication.   Gastrointestinal:  Negative for abdominal distention, abdominal pain, anal bleeding, blood in stool, change in bowel habit, constipation, diarrhea, nausea, vomiting, reflux and fecal incontinence.   Endocrine: Negative for cold intolerance, heat intolerance, polydipsia, polyphagia and polyuria.   Genitourinary:  Negative for bladder incontinence, decreased urine volume, difficulty urinating, discharge, dysuria, enuresis, erectile dysfunction, flank pain, frequency, genital sores, hematuria, penile pain, testicular pain and urgency.   Musculoskeletal:  Positive for back pain and myalgias. Negative for arthralgias, gait problem, joint swelling, leg pain, neck pain, neck stiffness and joint deformity.   Integumentary:  Negative for pallor, rash, wound and mole/lesion.   Allergic/Immunologic: Negative for environmental allergies,  food allergies and frequent infections.   Neurological:  Negative for dizziness, vertigo, tremors, seizures, syncope, facial asymmetry, speech difficulty, weakness, light-headedness, numbness, headaches, memory loss and coordination difficulties.   Hematological:  Negative for adenopathy. Does not bruise/bleed easily.   Psychiatric/Behavioral:  Negative for agitation, behavioral problems, confusion, decreased concentration, dysphoric mood, hallucinations, self-injury, sleep disturbance and suicidal ideas. The patient is not nervous/anxious and is not hyperactive.          Objective:      Physical Exam  Vitals reviewed.   Constitutional:       Appearance: Normal appearance. He is normal weight.   HENT:      Head: Normocephalic and atraumatic.      Right Ear: Tympanic membrane and ear canal normal.      Left Ear: Tympanic membrane, ear canal and external ear normal.      Nose: Congestion and rhinorrhea present.      Mouth/Throat:      Mouth: Mucous membranes are moist.      Pharynx: Oropharynx is clear. Posterior oropharyngeal erythema present.   Eyes:      Extraocular Movements: Extraocular movements intact.      Conjunctiva/sclera: Conjunctivae normal.      Pupils: Pupils are equal, round, and reactive to light.   Cardiovascular:      Rate and Rhythm: Normal rate and regular rhythm.      Pulses: Normal pulses.      Heart sounds: Normal heart sounds.   Pulmonary:      Effort: Pulmonary effort is normal.      Breath sounds: Normal breath sounds.   Abdominal:      General: Abdomen is flat. Bowel sounds are normal.      Palpations: Abdomen is soft.   Musculoskeletal:         General: Tenderness present. Normal range of motion.      Cervical back: Normal range of motion and neck supple.   Skin:     General: Skin is warm and dry.   Neurological:      General: No focal deficit present.      Mental Status: He is alert and oriented to person, place, and time. Mental status is at baseline.   Psychiatric:         Mood and  Affect: Mood normal.         Behavior: Behavior normal.         Thought Content: Thought content normal.         Judgment: Judgment normal.         Assessment:       1. Sinusitis, unspecified chronicity, unspecified location    2. Fever, unspecified fever cause    3. Muscle spasm        Plan:     Sinusitis, unspecified chronicity, unspecified location  -     cefTRIAXone injection 500 mg    Fever, unspecified fever cause  -     POCT Influenza A/B Rapid Antigen  -     POCT COVID-19 Rapid Screening    Muscle spasm  -     ketorolac injection 30 mg    Other orders  -     amoxicillin-clavulanate 875-125mg (AUGMENTIN) 875-125 mg per tablet; Take 1 tablet by mouth 2 (two) times a day. for 7 days  Dispense: 14 tablet; Refill: 0  -     predniSONE (DELTASONE) 10 MG tablet; Take 1 tablet (10 mg total) by mouth once daily. for 5 days  Dispense: 5 tablet; Refill: 0  -     tiZANidine (ZANAFLEX) 4 MG tablet; Take 1 tablet (4 mg total) by mouth 3 (three) times daily as needed (spasm).  Dispense: 20 tablet; Refill: 0  -     ibuprofen (ADVIL,MOTRIN) 600 MG tablet; Take 1 tablet (600 mg total) by mouth every 6 (six) hours as needed for Pain.  Dispense: 20 tablet; Refill: 0

## 2023-12-06 DIAGNOSIS — S40.262A TICK BITE OF LEFT SHOULDER, INITIAL ENCOUNTER: Primary | ICD-10-CM
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
## 2023-12-06 RX ORDER — DOXYCYCLINE 100 MG/1
100 CAPSULE ORAL EVERY 12 HOURS
Qty: 20 CAPSULE | Refills: 0 | Status: SHIPPED | OUTPATIENT
Start: 2023-12-06 | End: 2023-12-16

## 2023-12-13 DIAGNOSIS — E11.9 DIABETES MELLITUS WITHOUT COMPLICATION: ICD-10-CM

## 2023-12-13 DIAGNOSIS — F32.A DEPRESSION, UNSPECIFIED DEPRESSION TYPE: ICD-10-CM

## 2023-12-13 RX ORDER — METFORMIN HYDROCHLORIDE 1000 MG/1
1000 TABLET ORAL 2 TIMES DAILY
Qty: 180 TABLET | Refills: 3 | Status: SHIPPED | OUTPATIENT
Start: 2023-12-13

## 2023-12-13 RX ORDER — CITALOPRAM 20 MG/1
20 TABLET, FILM COATED ORAL DAILY
Qty: 90 TABLET | Refills: 3 | Status: SHIPPED | OUTPATIENT
Start: 2023-12-13 | End: 2024-03-05 | Stop reason: DRUGHIGH

## 2024-01-11 DIAGNOSIS — N40.1 BPH WITH OBSTRUCTION/LOWER URINARY TRACT SYMPTOMS: ICD-10-CM

## 2024-01-11 DIAGNOSIS — J30.2 SEASONAL ALLERGIC RHINITIS, UNSPECIFIED TRIGGER: ICD-10-CM

## 2024-01-11 DIAGNOSIS — N13.8 BPH WITH OBSTRUCTION/LOWER URINARY TRACT SYMPTOMS: ICD-10-CM

## 2024-01-11 RX ORDER — MONTELUKAST SODIUM 10 MG/1
10 TABLET ORAL DAILY
Qty: 90 TABLET | Refills: 3 | Status: SHIPPED | OUTPATIENT
Start: 2024-01-11

## 2024-01-11 RX ORDER — FINASTERIDE 5 MG/1
5 TABLET, FILM COATED ORAL DAILY
Qty: 90 TABLET | Refills: 3 | Status: SHIPPED | OUTPATIENT
Start: 2024-01-11 | End: 2025-01-10

## 2024-01-22 ENCOUNTER — HOSPITAL ENCOUNTER (OUTPATIENT)
Dept: RADIOLOGY | Facility: HOSPITAL | Age: 69
Discharge: HOME | End: 2024-01-22
Attending: NURSE PRACTITIONER
Payer: MEDICARE

## 2024-01-22 ENCOUNTER — OFFICE VISIT (OUTPATIENT)
Dept: PRIMARY CARE CLINIC | Facility: CLINIC | Age: 69
End: 2024-01-22
Payer: MEDICARE

## 2024-01-22 VITALS
TEMPERATURE: 98 F | HEIGHT: 66 IN | WEIGHT: 192 LBS | OXYGEN SATURATION: 96 % | DIASTOLIC BLOOD PRESSURE: 70 MMHG | SYSTOLIC BLOOD PRESSURE: 132 MMHG | BODY MASS INDEX: 30.86 KG/M2 | HEART RATE: 92 BPM | RESPIRATION RATE: 16 BRPM

## 2024-01-22 DIAGNOSIS — R74.8 ABNORMAL LIVER ENZYMES: ICD-10-CM

## 2024-01-22 DIAGNOSIS — E78.5 HYPERLIPIDEMIA, UNSPECIFIED HYPERLIPIDEMIA TYPE: Primary | ICD-10-CM

## 2024-01-22 DIAGNOSIS — G89.29 CHRONIC LEFT SHOULDER PAIN: ICD-10-CM

## 2024-01-22 DIAGNOSIS — M25.512 CHRONIC LEFT SHOULDER PAIN: ICD-10-CM

## 2024-01-22 DIAGNOSIS — E11.9 DIABETES MELLITUS WITHOUT COMPLICATION: ICD-10-CM

## 2024-01-22 PROCEDURE — 73030 X-RAY EXAM OF SHOULDER: CPT | Mod: TC,LT

## 2024-01-22 PROCEDURE — 99214 OFFICE O/P EST MOD 30 MIN: CPT | Mod: ,,, | Performed by: NURSE PRACTITIONER

## 2024-01-22 PROCEDURE — 96372 THER/PROPH/DIAG INJ SC/IM: CPT | Mod: ,,, | Performed by: NURSE PRACTITIONER

## 2024-01-22 PROCEDURE — 73030 X-RAY EXAM OF SHOULDER: CPT | Mod: 26,LT,, | Performed by: RADIOLOGY

## 2024-01-22 RX ORDER — MELOXICAM 7.5 MG/1
7.5 TABLET ORAL DAILY
Qty: 90 TABLET | Refills: 1 | Status: ON HOLD | OUTPATIENT
Start: 2024-01-22 | End: 2024-05-28 | Stop reason: HOSPADM

## 2024-01-22 RX ORDER — DEXAMETHASONE SODIUM PHOSPHATE 4 MG/ML
4 INJECTION, SOLUTION INTRA-ARTICULAR; INTRALESIONAL; INTRAMUSCULAR; INTRAVENOUS; SOFT TISSUE
Status: COMPLETED | OUTPATIENT
Start: 2024-01-22 | End: 2024-01-22

## 2024-01-22 RX ADMIN — DEXAMETHASONE SODIUM PHOSPHATE 4 MG: 4 INJECTION, SOLUTION INTRA-ARTICULAR; INTRALESIONAL; INTRAMUSCULAR; INTRAVENOUS; SOFT TISSUE at 02:01

## 2024-01-22 NOTE — PROGRESS NOTES
Subjective     Patient ID: Richard Soliz is a 68 y.o. male.    Chief Complaint: Arm Pain and Shoulder Pain (Left arm and shoulder pain since November)    Pt presents for labs and left shoulder pain x several months. Pt denies injury but has decreased ROM to left shoulder.       Review of Systems   Constitutional:  Negative for activity change, appetite change, fatigue and fever.   HENT:  Negative for nasal congestion, nosebleeds, postnasal drip, rhinorrhea, sinus pressure/congestion, sneezing and sore throat.    Eyes:  Negative for pain and itching.   Respiratory:  Negative for cough, chest tightness, shortness of breath, wheezing and stridor.    Cardiovascular:  Negative for chest pain.   Gastrointestinal:  Negative for abdominal pain.   Genitourinary:  Negative for dysuria.   Musculoskeletal:  Negative for back pain.        Left shoulder pain   Neurological:  Negative for dizziness and headaches.   Psychiatric/Behavioral:  Negative for behavioral problems and confusion.           Objective     Physical Exam  Vitals and nursing note reviewed.   Constitutional:       Appearance: Normal appearance.   Cardiovascular:      Rate and Rhythm: Normal rate and regular rhythm.      Heart sounds: Normal heart sounds.   Pulmonary:      Effort: Pulmonary effort is normal.      Breath sounds: Normal breath sounds.   Musculoskeletal:      Left shoulder: No swelling. Decreased range of motion.   Neurological:      Mental Status: He is alert and oriented to person, place, and time.   Psychiatric:         Mood and Affect: Mood normal.         Behavior: Behavior normal.            Assessment and Plan     1. Hyperlipidemia, unspecified hyperlipidemia type    2. Diabetes mellitus without complication  -     Hemoglobin A1C; Future; Expected date: 01/22/2024  -     CBC Auto Differential; Future; Expected date: 01/22/2024  -     Comprehensive Metabolic Panel; Future; Expected date: 01/22/2024  -     Lipid Panel; Future; Expected date:  01/22/2024  -     TSH; Future; Expected date: 01/22/2024    3. Abnormal liver enzymes    4. Chronic left shoulder pain  -     meloxicam (MOBIC) 7.5 MG tablet; Take 1 tablet (7.5 mg total) by mouth once daily.  Dispense: 90 tablet; Refill: 1  -     dexAMETHasone injection 4 mg  -     X-Ray Shoulder 2 or More Views Left; Future; Expected date: 01/22/2024  -     Ambulatory referral/consult to Orthopedics; Future; Expected date: 01/29/2024  -     MRI Shoulder Without Contrast Left; Future; Expected date: 01/22/2024        Will call pt with lab results. Entered an order for a referral to Dr. Huynh for left shoulder pain.         Follow up in about 6 months (around 7/22/2024).

## 2024-01-23 ENCOUNTER — PATIENT MESSAGE (OUTPATIENT)
Dept: PRIMARY CARE CLINIC | Facility: CLINIC | Age: 69
End: 2024-01-23
Payer: MEDICARE

## 2024-01-29 ENCOUNTER — OFFICE VISIT (OUTPATIENT)
Dept: ORTHOPEDICS | Facility: CLINIC | Age: 69
End: 2024-01-29
Payer: MEDICARE

## 2024-01-29 VITALS — HEIGHT: 66 IN | WEIGHT: 192 LBS | BODY MASS INDEX: 30.86 KG/M2

## 2024-01-29 DIAGNOSIS — M25.512 CHRONIC LEFT SHOULDER PAIN: ICD-10-CM

## 2024-01-29 DIAGNOSIS — G89.29 CHRONIC LEFT SHOULDER PAIN: ICD-10-CM

## 2024-01-29 DIAGNOSIS — M25.812 IMPINGEMENT OF LEFT SHOULDER: Primary | ICD-10-CM

## 2024-01-29 DIAGNOSIS — M19.012 ARTHRITIS OF LEFT ACROMIOCLAVICULAR JOINT: ICD-10-CM

## 2024-01-29 PROCEDURE — 99203 OFFICE O/P NEW LOW 30 MIN: CPT | Mod: S$PBB,25,, | Performed by: ORTHOPAEDIC SURGERY

## 2024-01-29 PROCEDURE — 99999PBSHW PR PBB SHADOW TECHNICAL ONLY FILED TO HB: Mod: PBBFAC,,,

## 2024-01-29 PROCEDURE — 20610 DRAIN/INJ JOINT/BURSA W/O US: CPT | Mod: PBBFAC | Performed by: ORTHOPAEDIC SURGERY

## 2024-01-29 PROCEDURE — 20610 DRAIN/INJ JOINT/BURSA W/O US: CPT | Mod: 50,PBBFAC | Performed by: ORTHOPAEDIC SURGERY

## 2024-01-29 PROCEDURE — 20610 DRAIN/INJ JOINT/BURSA W/O US: CPT | Mod: S$PBB,LT,, | Performed by: ORTHOPAEDIC SURGERY

## 2024-01-29 PROCEDURE — 99213 OFFICE O/P EST LOW 20 MIN: CPT | Mod: PBBFAC,25 | Performed by: ORTHOPAEDIC SURGERY

## 2024-01-29 PROCEDURE — 20610 DRAIN/INJ JOINT/BURSA W/O US: CPT | Mod: PBBFAC,LT | Performed by: ORTHOPAEDIC SURGERY

## 2024-01-29 RX ORDER — BUPIVACAINE HYDROCHLORIDE 2.5 MG/ML
1 INJECTION, SOLUTION EPIDURAL; INFILTRATION; INTRACAUDAL
Status: SHIPPED | OUTPATIENT
Start: 2024-01-29

## 2024-01-29 RX ORDER — TRIAMCINOLONE ACETONIDE 40 MG/ML
40 INJECTION, SUSPENSION INTRA-ARTICULAR; INTRAMUSCULAR
Status: SHIPPED | OUTPATIENT
Start: 2024-01-29

## 2024-01-29 RX ADMIN — BUPIVACAINE HYDROCHLORIDE 1 ML: 2.5 INJECTION, SOLUTION EPIDURAL; INFILTRATION; INTRACAUDAL at 02:01

## 2024-01-29 RX ADMIN — TRIAMCINOLONE ACETONIDE 40 MG: 40 INJECTION, SUSPENSION INTRA-ARTICULAR; INTRAMUSCULAR at 02:01

## 2024-01-29 NOTE — PROCEDURES
Large Joint Aspiration/Injection: bilateral subacromial bursa    Date/Time: 1/29/2024 2:30 PM    Performed by: Ky Mercedes MD  Authorized by: Ky Mercedes MD    Consent Done?:  Yes (Verbal)  Indications:  Pain    Details:  Needle Size:  22 G  Ultrasonic Guidance for needle placement?: No    Approach:  Posterior  Location:  Shoulder  Laterality:  Bilateral  Site:  Bilateral subacromial bursa  Medications (Right):  1 mL BUPivacaine (PF) 0.25% (2.5 mg/ml) 0.25 % (2.5 mg/mL); 40 mg triamcinolone acetonide 40 mg/mL  Medications (Left):  1 mL BUPivacaine (PF) 0.25% (2.5 mg/ml) 0.25 % (2.5 mg/mL); 40 mg triamcinolone acetonide 40 mg/mL  Patient tolerance:  Patient tolerated the procedure well with no immediate complications

## 2024-01-29 NOTE — PROGRESS NOTES
Clinic Note        CC:   Chief Complaint   Patient presents with    Left Shoulder - Pain        Principal problem: Impingement of left shoulder [M25.812]     REASON FOR VISIT:       HISTORY:  68-year-old male complaining left shoulder pain for proximally 6 months.  He has been doing some taken anti-inflammatories not getting relief the symptoms.  He has not had an MRI as a plate and over his left eye      PAST MEDICAL HISTORY:   Past Medical History:   Diagnosis Date    Acquired asymmetry of prostate     Acute prostatitis 10/29/2021    Allergic rhinitis     Asthma     BPH with obstruction/lower urinary tract symptoms 10/29/2021    [facility]     Community acquired pneumonia of left lower lobe of lung 2018    COPD (chronic obstructive pulmonary disease)     Essential hypertension     GERD (gastroesophageal reflux disease)     Hyperlipidemia     Personal history of colonic polyps 01/16/2017    repeat colonoscopy in 3 years    Type 2 diabetes mellitus     Vitamin D deficiency           PAST SURGICAL HISTORY:   Past Surgical History:   Procedure Laterality Date    COLONOSCOPY W/ BIOPSIES  01/16/2017    repeat in 3 years    nose surgery for broken nose as a child      right carpal tunnel surgery  03/2021    TONSILLECTOMY AND ADENOIDECTOMY            ALLERGIES: Review of patient's allergies indicates:  No Known Allergies     MEDICATIONS :    Current Outpatient Medications:     albuterol (PROVENTIL HFA) 90 mcg/actuation inhaler, Inhale 2 puffs into the lungs every 6 (six) hours as needed for Wheezing. Rescue, Disp: 18 g, Rfl: 3    C,E,zinc,copper 11-omega3s-lut (OCUVITE ADULT 50 PLUS) 250-5-1 mg Cap, Take 1 tablet by mouth once daily., Disp: , Rfl:     cetirizine (ZYRTEC) 10 MG tablet, TAKE ONE (1) TABLET  BY MOUTH ONCE DAILY., Disp: 90 tablet, Rfl: 3    cinnamon bark-chromium picolin 500-100 mg-mcg Cap, Take 1 capsule by mouth once daily., Disp: , Rfl:     citalopram (CELEXA) 20 MG tablet, Take 1 tablet (20 mg  total) by mouth once daily., Disp: 90 tablet, Rfl: 3    empagliflozin (JARDIANCE) 25 mg tablet, Take 1 tablet (25 mg total) by mouth once daily., Disp: 90 tablet, Rfl: 3    finasteride (PROSCAR) 5 mg tablet, Take 1 tablet (5 mg total) by mouth once daily., Disp: 90 tablet, Rfl: 3    meloxicam (MOBIC) 7.5 MG tablet, Take 1 tablet (7.5 mg total) by mouth once daily., Disp: 90 tablet, Rfl: 1    metFORMIN (GLUCOPHAGE) 1000 MG tablet, Take 1 tablet (1,000 mg total) by mouth 2 (two) times a day., Disp: 180 tablet, Rfl: 3    montelukast (SINGULAIR) 10 mg tablet, Take 1 tablet (10 mg total) by mouth once daily., Disp: 90 tablet, Rfl: 3    rosuvastatin (CRESTOR) 10 MG tablet, Take 1 tablet (10 mg total) by mouth every evening., Disp: 90 tablet, Rfl: 3     SOCIAL HISTORY:   Social History     Socioeconomic History    Marital status:      Spouse name: cecelia    Number of children: 1    Years of education: 12    Highest education level: 12th grade   Occupational History    Occupation:    Tobacco Use    Smoking status: Former    Smokeless tobacco: Never    Tobacco comments:     quit smoking 26 years ago   Substance and Sexual Activity    Alcohol use: Not Currently    Drug use: Never    Sexual activity: Not Currently     Social Determinants of Health     Financial Resource Strain: Low Risk  (10/13/2021)    Overall Financial Resource Strain (CARDIA)     Difficulty of Paying Living Expenses: Not hard at all   Food Insecurity: No Food Insecurity (10/13/2021)    Hunger Vital Sign     Worried About Running Out of Food in the Last Year: Never true     Ran Out of Food in the Last Year: Never true   Transportation Needs: No Transportation Needs (10/13/2021)    PRAPARE - Transportation     Lack of Transportation (Medical): No     Lack of Transportation (Non-Medical): No   Physical Activity: Sufficiently Active (10/13/2021)    Exercise Vital Sign     Days of Exercise per Week: 5 days     Minutes of Exercise per  Session: 30 min   Stress: Stress Concern Present (10/13/2021)    [facility]ational [facility] - Occupational Stress Questionnaire     Feeling of Stress : To some extent   Social Connections: Moderately Isolated (10/13/2021)    Social Connection and Isolation Panel [NHANES]     Frequency of Communication with Friends and Family: Three times a week     Frequency of Social Gatherings with Friends and Family: Once a week     Attends Anabaptist Services: Never     Active Member of Clubs or Organizations: No     Attends Club or Organization Meetings: Never     Marital Status:    Housing Stability: Low Risk  (10/13/2021)    Housing Stability Vital Sign     Unable to Pay for Housing in the Last Year: No     Number of Places Lived in the Last Year: 1     Unstable Housing in the Last Year: No          FAMILY HISTORY:   Family History   Problem Relation Age of Onset    Diabetes Mother     Hyperlipidemia Mother     Hypertension Mother     Diabetes Father     Heart failure Father     Hypertension Father     Cancer Father     Multiple sclerosis Sister     Hypertension Brother     Nephrolithiasis Brother           PHYSICAL EXAM:  In general, this is a well-developed, well-nourished male . The patient is alert, oriented and cooperative.      HEENT:  Normocephalic, atraumatic.  Extraocular movements are intact bilaterally.  The oropharynx is benign.       NECK:  Nontender with good range of motion.      LUNGS:  Clear to auscultation bilaterally.      HEART:  Demonstrates a regular rate and rhythm.  No murmurs appreciated.      ABDOMEN:  Soft, non-tender, non-distended.        EXTREMITIES:  Left upper extremity moves his fingers well has sensation to touch has motor function 5/5 distally palpable pulses.  He has tenderness palpation over his AC joint anterior lateral acromion.  Pain on impingement crossed adduction testing.  Negative sulcus test.        RADIOGRAPHIC FINDINGS:  X-rays show degenerative changes of his  AC joint downsloping lateral acromion      IMPRESSION: Impingement of left shoulder    Chronic left shoulder pain  -     Ambulatory referral/consult to Orthopedics    Arthritis of left acromioclavicular joint         PLAN:  This time he has impingement AC arthritis of the left shoulder we discussed treatment options I injected him with 1 cc Marcaine 1 cc Kenalog.  He was taught Thera-Band exercises given a Thera-Band.  I will follow him up 2 months.  If he does not improve we will consider an MRI.  He has had the plate in for 40 years it should be non magnetic but he is concerned about this.  Check him back in 2 months.Recommendation is for a steroid in injection in the shoulder. Risks and benefits of the procedure were explained. The patient verbalized understanding and did wish to proceed. After verbal consent was obtained we proceeded with injection. The shoulder was prepped with alcohol  betadine and the skin was anesthetized with cold spray. The shoulder was injected with a mixture of 1cc marcaine, and 1cc kenalog. A bandage was applied. The patient tolerated the injection well  There are no Patient Instructions on file for this visit.      Follow up in about 2 months (around 3/29/2024).         Ky Mercedes      (Subject to voice recognition error, transcription service not allowed)

## 2024-02-07 RX ORDER — BUPIVACAINE HYDROCHLORIDE 2.5 MG/ML
1 INJECTION, SOLUTION EPIDURAL; INFILTRATION; INTRACAUDAL
Status: SHIPPED | OUTPATIENT
Start: 2024-01-29

## 2024-02-07 RX ORDER — TRIAMCINOLONE ACETONIDE 40 MG/ML
40 INJECTION, SUSPENSION INTRA-ARTICULAR; INTRAMUSCULAR
Status: SHIPPED | OUTPATIENT
Start: 2024-01-29

## 2024-02-07 NOTE — PROCEDURES
Large Joint Aspiration/Injection: L subacromial bursa    Date/Time: 1/29/2024 2:30 PM    Performed by: Ky Mercedes MD  Authorized by: Ky Mercedes MD    Consent Done?:  Yes (Verbal)  Indications:  Pain    Details:  Needle Size:  22 G  Ultrasonic Guidance for needle placement?: No    Approach:  Posterior  Location:  Shoulder  Site:  L subacromial bursa  Medications:  1 mL BUPivacaine (PF) 0.25% (2.5 mg/ml) 0.25 % (2.5 mg/mL); 40 mg triamcinolone acetonide 40 mg/mL  Patient tolerance:  Patient tolerated the procedure well with no immediate complications

## 2024-03-05 ENCOUNTER — OFFICE VISIT (OUTPATIENT)
Dept: BEHAVIORAL HEALTH | Facility: CLINIC | Age: 69
End: 2024-03-05
Payer: MEDICARE

## 2024-03-05 VITALS
BODY MASS INDEX: 30.86 KG/M2 | SYSTOLIC BLOOD PRESSURE: 137 MMHG | OXYGEN SATURATION: 97 % | RESPIRATION RATE: 20 BRPM | WEIGHT: 192 LBS | HEIGHT: 66 IN | TEMPERATURE: 97 F | HEART RATE: 82 BPM | DIASTOLIC BLOOD PRESSURE: 70 MMHG

## 2024-03-05 DIAGNOSIS — F41.9 ANXIETY: ICD-10-CM

## 2024-03-05 DIAGNOSIS — F33.0 MILD EPISODE OF RECURRENT MAJOR DEPRESSIVE DISORDER: Primary | ICD-10-CM

## 2024-03-05 PROCEDURE — 90792 PSYCH DIAG EVAL W/MED SRVCS: CPT | Mod: ,,, | Performed by: NURSE PRACTITIONER

## 2024-03-05 RX ORDER — CITALOPRAM 40 MG/1
40 TABLET, FILM COATED ORAL DAILY
Qty: 90 TABLET | Refills: 3 | Status: SHIPPED | OUTPATIENT
Start: 2024-03-05 | End: 2025-02-28

## 2024-03-05 RX ORDER — ACETAMINOPHEN 500 MG
5000 TABLET ORAL DAILY
COMMUNITY

## 2024-03-05 RX ORDER — GLUCOSAMINE/CHONDRO SU A 500-400 MG
1 TABLET ORAL 3 TIMES DAILY
COMMUNITY

## 2024-03-05 RX ORDER — CALCIUM CARB, CITRATE, MALATE 250 MG
CAPSULE ORAL
COMMUNITY

## 2024-03-05 RX ORDER — OMEPRAZOLE 20 MG/1
20 CAPSULE, DELAYED RELEASE ORAL DAILY
COMMUNITY

## 2024-03-05 NOTE — PROGRESS NOTES
"Outpatient Psychiatry Initial Visit (MD/NP)    3/5/2024    Richard Soliz, a 68 y.o. male, presenting for initial evaluation visit. Met with patient.    Reason for Encounter: self-referral. Patient complains of   Chief Complaint   Patient presents with    Depression     Started in the past 30 years, no interest in anything       History of Present Illness: Has a history of depression and anxiety. Symptoms worsened one year ago when his dose of Citalopram was decreased from 40 mg to 20 mg. He is unable to ascertain why this occurred. He has been retired for 1.5 years and has not been enjoying shelter. He sits around "all day" and spends time in his den on Facebook and [employer] (Social media sites). He doesn't do anything in particular, merely searches for things. He is  x 2 and  x 1. He has two adult children with one being a stepchild. He did not graduate high school and attended no college. He has worked 3 jobs in his lifetime. [employer] as a , [employer], [employer], and [employer] again, respectively, the last of which he retired from after 30 years. He is unable to articulate why his symptoms of anhedonia have worsened, although he suspects that he has been like this for a lot longer than one year. He has a 30 x 30 foot shop and a boat that he has little to no interest in doing anything with or in.    Past Psychiatric History:  Prior diagnoses:  Depression and anxiety  Inpatient psychiatric treatment: None  Outpatient psychiatric treatment: None  Prior medications:  Citalopram  Current medications:  Citalopram  Prior suicide attempts: None  Prior history self harm: None  Prior psychotherapy: None  Prior psychological testing: None  Substance abuse: None     Review Of Systems:     Pertinent items are noted in HPI.    Current Evaluation:     Patient  reviewed this visit.     PHQ-9 Questionnaire  Little interest or pleasure in doing things: Nearly every day  Feeling down, depressed, or " hopeless: Not at all  Trouble falling or staying asleep, or sleeping too much: Nearly every day  Feeling tired or having little energy: Nearly every day  Poor appetite or overeating: Not at all  Feeling bad about yourself - or that you are a failure or have let yourself or your family down: Not at all  Trouble concentrating on things, such as reading the newspaper or watching television: Several days  Moving or speaking so slowly that other people could have noticed? Or the opposite - being so fidgety or restless that you have been moving around a lot more than usual.: Not at all  Thoughts that you would be better off dead or hurting yourself in some way: Not at all  Patient Health Questionnaire-9 Score: 10    How difficult have these problems made it for you to do your work, take care of things at home, or get along with other people?: Somewhat difficult    MANE-7 Questionnaire  Feeling nervous, anxious, or on edge: Not at all  Not being able to stop or control worrying: Not at all  Worrying too much about different things: Not at all  Trouble relaxing: Not at all  Being so restless that it is hard to sit still: Not at all  Becoming easily annoyed or irritable: Several days  Feeling afraid as if something awful might happen: Not at all  MANE-7 Total Score: 1      Mood Disorder Questionnaire:       3/5/2024     9:36 AM   MDQ Scale   you felt so good or so hyper that other people thought you were not your normal self or you were so hyper that you got into trouble? 0   you were so irritable that you shouted at people or started fights or arguments? 1   you felt much more self-confident than usual? 1   you got much less sleep than usual and found that you didn't really miss it? 0   you were more talkative or spoke much faster than usual? 0   thoughts raced through your head or you couldn't slow your mind down? 0   you were so easily distracted by things around you that you had trouble concentrating or staying on track? 1    you had more energy than usual? 0   you were much more active or did many more things than usual? 1   you were much more social or outgoing than usual, for example, you telephoned friends in the middle of the night? 0   you were much more interested in sex than usual? 0   you did things that were unusual for you or that other people might have thought were excessive, foolish, or risky? 0   spending money got you or your family in trouble? 0   If you checked YES to more than one of the above, have several of these ever happened during the same period of time? 0   How much of a problem did any of these cause you - like being unable to work; having family, money or legal troubles; getting into arguments or fights? No problems   Mood Disorder Questionnaire Score  4     ASRS:       3/5/2024     9:46 AM   Adult ADHD Self-Report Scale   How often do you have trouble wrapping up the final details of a project once the chanllenging parts have been done? 4   How often do you have difficulty getting things in order when you have to do a task that requires organization? 3   How often do you have problems remembering appointments or obligations? 1   When you have a task that requires a lot of thought, how often do you avoid or delay getting started? 3   How often do you fidget or squirm with your hands or feet when you have to sit down for a long time? 2   How often do you feel overly active and compelled to do things, like you were driven by a motor? 4   Part A Score 17   How often do you make careless mistakes when you have to work on a boring or difficult project? 2   How often do you have difficulty keeping your attention when you are doing boring or repetitive work? 3   How often do you have difficulty concentrating on what people say to you, even when they are speaking to you directly? 3   How often do you misplace or have difficulty finding things at home or at work? 3   How often are you distracted by activity or noise  "around you? 3   How often do you leave your seat in meetings or other situations in which you are expected to remain seated? 1   How often do you feel restless or fidgety? 3   How often do you have difficulty unwinding and relaxing when you have time to yourself? 2   How often do you find yourself talking too much when you are in social situations? 2   When you're in a conversation, how often do you find yourself finishing the sentences of the people you are talking to before they can finish them themselves? 2   How often do you have difficulty waiting your turn in situations when turn taking is required? 2   How often do you interrupt others when they are busy? 2   Part B Score 28       Nutritional Screening: Considering the patient's height and weight, medications, medical history and preferences, should a referral be made to the dietitian? no    Constitutional  Vitals:  Most recent vital signs, dated greater than 90 days prior to this appointment, were reviewed.    Vitals:    03/05/24 0910   BP: 137/70   Pulse: 82   Resp: 20   Temp: 97 °F (36.1 °C)   SpO2: 97%   Weight: 87.1 kg (192 lb)   Height: 5' 6" (1.676 m)        General:  age appropriate, older than stated age, casually dressed, neatly groomed     Musculoskeletal  Muscle Strength/Tone:  no spasicity, no rigidity, no cogwheeling   Gait & Station:  non-ataxic     Psychiatric  Speech:  no latency; no press   Mood & Affect:  steady  congruent and appropriate   Thought Process:  normal and logical   Associations:  intact   Thought Content:  normal, no suicidality, no homicidality, delusions, or paranoia   Insight:  has awareness of illness   Judgement: behavior is adequate to circumstances   Orientation:  grossly intact   Memory: intact for content of interview   Language: grossly intact   Attention Span & Concentration:  able to focus   Fund of Knowledge:  intact and appropriate to age and level of education, familiar with aspects of current personal life, 4 " of 4 recent presidents       Relevant Elements of Neurological Exam: normal gait    Functioning in Relationships:  Spouse/partner:  x 30+ years. Has a supportive wife.  Peers: Has no friends. Is a [employer] member, but is somewhat socially isolative.  Employers: Retired.    Laboratory Data  No visits with results within 1 Month(s) from this visit.   Latest known visit with results is:   Lab Visit on 01/22/2024   Component Date Value Ref Range Status    Hemoglobin A1C 01/22/2024 8.3 (H)  4.5 - 6.6 % Final    Estimated Average Glucose 01/22/2024 192  mg/dL Final    Sodium 01/22/2024 139  136 - 145 mmol/L Final    Potassium 01/22/2024 4.5  3.5 - 5.1 mmol/L Final    Chloride 01/22/2024 109 (H)  98 - 107 mmol/L Final    CO2 01/22/2024 22  21 - 32 mmol/L Final    Anion Gap 01/22/2024 13  7 - 16 mmol/L Final    Glucose 01/22/2024 201 (H)  74 - 106 mg/dL Final    BUN 01/22/2024 20 (H)  7 - 18 mg/dL Final    Creatinine 01/22/2024 1.08  0.70 - 1.30 mg/dL Final    BUN/Creatinine Ratio 01/22/2024 19  6 - 20 Final    Calcium 01/22/2024 9.4  8.5 - 10.1 mg/dL Final    Total Protein 01/22/2024 7.4  6.4 - 8.2 g/dL Final    Albumin 01/22/2024 3.9  3.5 - 5.0 g/dL Final    Globulin 01/22/2024 3.5  2.0 - 4.0 g/dL Final    A/G Ratio 01/22/2024 1.1   Final    Bilirubin, Total 01/22/2024 0.3  >0.0 - 1.2 mg/dL Final    Alk Phos 01/22/2024 139 (H)  45 - 115 U/L Final    ALT 01/22/2024 39  16 - 61 U/L Final    AST 01/22/2024 14 (L)  15 - 37 U/L Final    eGFR 01/22/2024 76  >=60 mL/min/1.73m2 Final    Triglycerides 01/22/2024 238 (H)  35 - 150 mg/dL Final    Cholesterol 01/22/2024 165  0 - 200 mg/dL Final    HDL Cholesterol 01/22/2024 45  40 - 60 mg/dL Final    Cholesterol/HDL Ratio (Risk Factor) 01/22/2024 3.7   Final    Non-HDL 01/22/2024 120  mg/dL Final    LDL Calculated 01/22/2024 72  mg/dL Final    LDL/HDL 01/22/2024 1.6   Final    VLDL 01/22/2024 48  mg/dL Final    TSH 01/22/2024 1.980  0.358 - 3.740 uIU/mL Final    WBC 01/22/2024  9.92  4.50 - 11.00 K/uL Final    RBC 01/22/2024 5.29  4.60 - 6.20 M/uL Final    Hemoglobin 01/22/2024 15.9  13.5 - 18.0 g/dL Final    Hematocrit 01/22/2024 47.6  40.0 - 54.0 % Final    MCV 01/22/2024 90.0  80.0 - 96.0 fL Final    MCH 01/22/2024 30.1  27.0 - 31.0 pg Final    MCHC 01/22/2024 33.4  32.0 - 36.0 g/dL Final    RDW 01/22/2024 13.8  11.5 - 14.5 % Final    Platelet Count 01/22/2024 273  150 - 400 K/uL Final    MPV 01/22/2024 8.8 (L)  9.4 - 12.4 fL Final    Neutrophils % 01/22/2024 61.0  53.0 - 65.0 % Final    Lymphocytes % 01/22/2024 26.1 (L)  27.0 - 41.0 % Final    Monocytes % 01/22/2024 8.2 (H)  2.0 - 6.0 % Final    Eosinophils % 01/22/2024 2.8  1.0 - 4.0 % Final    Basophils % 01/22/2024 1.0  0.0 - 1.0 % Final    Immature Granulocytes % 01/22/2024 0.9 (H)  0.0 - 0.4 % Final    nRBC, Auto 01/22/2024 0.0  <=0.0 % Final    Neutrophils, Abs 01/22/2024 6.05  1.80 - 7.70 K/uL Final    Lymphocytes, Absolute 01/22/2024 2.59  1.00 - 4.80 K/uL Final    Monocytes, Absolute 01/22/2024 0.81 (H)  0.00 - 0.80 K/uL Final    Eosinophils, Absolute 01/22/2024 0.28  0.00 - 0.50 K/uL Final    Basophils, Absolute 01/22/2024 0.10  0.00 - 0.20 K/uL Final    Immature Granulocytes, Absolute 01/22/2024 0.09 (H)  0.00 - 0.04 K/uL Final    nRBC, Absolute 01/22/2024 0.00  <=0.00 x10e3/uL Final    Diff Type 01/22/2024 Auto   Final         Medications  Outpatient Encounter Medications as of 3/5/2024   Medication Sig Dispense Refill    albuterol (PROVENTIL HFA) 90 mcg/actuation inhaler Inhale 2 puffs into the lungs every 6 (six) hours as needed for Wheezing. Rescue 18 g 3    C,E,zinc,copper 11-omega3s-lut (OCUVITE ADULT 50 PLUS) 250-5-1 mg Cap Take 1 tablet by mouth once daily.      cetirizine (ZYRTEC) 10 MG tablet TAKE ONE (1) TABLET  BY MOUTH ONCE DAILY. 90 tablet 3    cholecalciferol, vitamin D3, 125 mcg (5,000 unit) Tab Take 5,000 Units by mouth once daily.      cinnamon bark-chromium picolin 500-100 mg-mcg Cap Take 1 capsule by  mouth once daily.      citalopram (CELEXA) 20 MG tablet Take 1 tablet (20 mg total) by mouth once daily. 90 tablet 3    empagliflozin (JARDIANCE) 25 mg tablet Take 1 tablet (25 mg total) by mouth once daily. 90 tablet 3    finasteride (PROSCAR) 5 mg tablet Take 1 tablet (5 mg total) by mouth once daily. 90 tablet 3    glucosamine-chondroitin 500-400 mg tablet Take 1 tablet by mouth 3 (three) times daily.      meloxicam (MOBIC) 7.5 MG tablet Take 1 tablet (7.5 mg total) by mouth once daily. 90 tablet 1    metFORMIN (GLUCOPHAGE) 1000 MG tablet Take 1 tablet (1,000 mg total) by mouth 2 (two) times a day. 180 tablet 3    montelukast (SINGULAIR) 10 mg tablet Take 1 tablet (10 mg total) by mouth once daily. 90 tablet 3    omeprazole (PRILOSEC) 20 MG capsule Take 20 mg by mouth once daily.      potassium citrate 99 mg Cap Take by mouth.      rosuvastatin (CRESTOR) 10 MG tablet Take 1 tablet (10 mg total) by mouth every evening. 90 tablet 3     Facility-Administered Encounter Medications as of 3/5/2024   Medication Dose Route Frequency Provider Last Rate Last Admin    BUPivacaine (PF) 0.25% (2.5 mg/ml) injection 1 mL  1 mL   Ky Mercedes MD   1 mL at 01/29/24 1430    BUPivacaine (PF) 0.25% (2.5 mg/ml) injection 1 mL  1 mL   Ky Mercedes MD   1 mL at 01/29/24 1430    BUPivacaine (PF) 0.25% (2.5 mg/ml) injection 1 mL  1 mL   Ky Mercedes MD   1 mL at 01/29/24 1430    triamcinolone acetonide injection 40 mg  40 mg Intra-articular  Ky Mercedes MD   40 mg at 01/29/24 1430    triamcinolone acetonide injection 40 mg  40 mg Intra-articular  Ky Mercedes MD   40 mg at 01/29/24 1430    triamcinolone acetonide injection 40 mg  40 mg Intra-articular  Ky Mercedes MD   40 mg at 01/29/24 1430     Assessment - Diagnosis - Goals:     Impression: Is not a very good historian. Is somewhat socially isolative and would benefit from having friends and a social outlet other than his wife. Is somewhat sedentary and spends  a lot of time on social media. Will increase Citalopram to 40 mg and follow up in 3 months. Has been on Citalopram for years and has Major Depressive Disorder, mild, and very mild anxiety which he attests is well managed with Citalopram.      ICD-10-CM ICD-9-CM   1. Mild episode of recurrent major depressive disorder  F33.0 296.31   2. Anxiety  F41.9 300.00       Strengths and Liabilities: Strength: Patient accepts guidance/feedback, Strength: Patient is expressive/articulate., Strength: Patient is intelligent., Strength: Patient has positive support network., Strength: Patient has reasonable judgment., Strength: Patient is stable., Liability: Patient lacks social skills., Liability: Patient has poor health., Liability: Patient lacks coping skills.    Treatment Goals:  Specify outcomes written in observable, behavioral terms:   Depression: increasing energy, increasing interest in usual activities, increasing motivation, and reducing fatigue    Treatment Plan/Recommendations:   Medication Management: Continue current medications. The risks and benefits of medication were discussed with the patient. Verbalized understanding.  The treatment plan and follow up plan were reviewed with the patient.      Medications:   Medication List with Changes/Refills   New Medications    CITALOPRAM (CELEXA) 40 MG TABLET    Take 1 tablet (40 mg total) by mouth once daily.       Start Date: 3/5/2024  End Date: 2/28/2025   Current Medications    ALBUTEROL (PROVENTIL HFA) 90 MCG/ACTUATION INHALER    Inhale 2 puffs into the lungs every 6 (six) hours as needed for Wheezing. Rescue       Start Date: 7/20/2023 End Date: 7/19/2024    C,E,ZINC,COPPER 11-OMEGA3S-LUT (OCUVITE ADULT 50 PLUS) 250-5-1 MG CAP    Take 1 tablet by mouth once daily.       Start Date: --        End Date: --    CETIRIZINE (ZYRTEC) 10 MG TABLET    TAKE ONE (1) TABLET  BY MOUTH ONCE DAILY.       Start Date: 10/27/2022End Date: --    CHOLECALCIFEROL, VITAMIN D3, 125 MCG  (5,000 UNIT) TAB    Take 5,000 Units by mouth once daily.       Start Date: --        End Date: --    CINNAMON BARK-CHROMIUM PICOLIN 500-100 MG-MCG CAP    Take 1 capsule by mouth once daily.       Start Date: --        End Date: --    EMPAGLIFLOZIN (JARDIANCE) 25 MG TABLET    Take 1 tablet (25 mg total) by mouth once daily.       Start Date: 7/20/2023 End Date: --    FINASTERIDE (PROSCAR) 5 MG TABLET    Take 1 tablet (5 mg total) by mouth once daily.       Start Date: 1/11/2024 End Date: 1/10/2025    GLUCOSAMINE-CHONDROITIN 500-400 MG TABLET    Take 1 tablet by mouth 3 (three) times daily.       Start Date: --        End Date: --    MELOXICAM (MOBIC) 7.5 MG TABLET    Take 1 tablet (7.5 mg total) by mouth once daily.       Start Date: 1/22/2024 End Date: --    METFORMIN (GLUCOPHAGE) 1000 MG TABLET    Take 1 tablet (1,000 mg total) by mouth 2 (two) times a day.       Start Date: 12/13/2023End Date: --    MONTELUKAST (SINGULAIR) 10 MG TABLET    Take 1 tablet (10 mg total) by mouth once daily.       Start Date: 1/11/2024 End Date: --    OMEPRAZOLE (PRILOSEC) 20 MG CAPSULE    Take 20 mg by mouth once daily.       Start Date: --        End Date: --    POTASSIUM CITRATE 99 MG CAP    Take by mouth.       Start Date: --        End Date: --    ROSUVASTATIN (CRESTOR) 10 MG TABLET    Take 1 tablet (10 mg total) by mouth every evening.       Start Date: 7/20/2023 End Date: --   Discontinued Medications    CITALOPRAM (CELEXA) 20 MG TABLET    Take 1 tablet (20 mg total) by mouth once daily.       Start Date: 12/13/2023End Date: 3/5/2024     Return to Clinic: 3 months    Patient instructed to please go to emergency department if feeling as though you are going to harm to yourself or others or if you are in crisis; or to please call the clinic to report any worsening of symptoms or problems associated with medication.     Total time: 63 minutes

## 2024-03-09 DIAGNOSIS — Z71.89 COMPLEX CARE COORDINATION: ICD-10-CM

## 2024-05-25 ENCOUNTER — HOSPITAL ENCOUNTER (INPATIENT)
Facility: HOSPITAL | Age: 69
LOS: 5 days | Discharge: ANOTHER HOSPITAL | DRG: 281 | End: 2024-05-30
Attending: EMERGENCY MEDICINE | Admitting: INTERNAL MEDICINE
Payer: MEDICARE

## 2024-05-25 DIAGNOSIS — F41.9 ANXIETY: ICD-10-CM

## 2024-05-25 DIAGNOSIS — R07.9 CHEST PAIN: ICD-10-CM

## 2024-05-25 DIAGNOSIS — I21.4 NSTEMI (NON-ST ELEVATED MYOCARDIAL INFARCTION): Primary | ICD-10-CM

## 2024-05-25 DIAGNOSIS — I10 PRIMARY HYPERTENSION: ICD-10-CM

## 2024-05-25 DIAGNOSIS — E11.9 DIABETES MELLITUS WITHOUT COMPLICATION: ICD-10-CM

## 2024-05-25 PROBLEM — E66.9 OBESITY, DIABETES, AND HYPERTENSION SYNDROME: Status: ACTIVE | Noted: 2024-05-25

## 2024-05-25 PROBLEM — G47.30 SLEEP APNEA: Status: ACTIVE | Noted: 2024-05-25

## 2024-05-25 PROBLEM — E11.59 OBESITY, DIABETES, AND HYPERTENSION SYNDROME: Status: ACTIVE | Noted: 2024-05-25

## 2024-05-25 PROBLEM — I15.2 OBESITY, DIABETES, AND HYPERTENSION SYNDROME: Status: ACTIVE | Noted: 2024-05-25

## 2024-05-25 PROBLEM — J44.1 COPD EXACERBATION: Status: ACTIVE | Noted: 2024-05-25

## 2024-05-25 PROBLEM — I42.9 CARDIOMYOPATHY: Status: ACTIVE | Noted: 2024-05-25

## 2024-05-25 PROBLEM — J44.9 COPD (CHRONIC OBSTRUCTIVE PULMONARY DISEASE): Status: ACTIVE | Noted: 2024-05-25

## 2024-05-25 PROBLEM — F32.A DEPRESSION: Status: ACTIVE | Noted: 2024-05-25

## 2024-05-25 PROBLEM — E11.69 OBESITY, DIABETES, AND HYPERTENSION SYNDROME: Status: ACTIVE | Noted: 2024-05-25

## 2024-05-25 LAB
ABORH RETYPE: NORMAL
ALBUMIN SERPL BCP-MCNC: 3.8 G/DL (ref 3.5–5)
ALBUMIN/GLOB SERPL: 1.2 {RATIO}
ALP SERPL-CCNC: 132 U/L (ref 45–115)
ALT SERPL W P-5'-P-CCNC: 32 U/L (ref 16–61)
ANION GAP SERPL CALCULATED.3IONS-SCNC: 11 MMOL/L (ref 7–16)
ANION GAP SERPL CALCULATED.3IONS-SCNC: 13 MMOL/L (ref 7–16)
AORTIC ROOT ANNULUS: 2.77 CM
AORTIC VALVE CUSP SEPERATION: 2.16 CM
APTT PPP: 27.3 SECONDS (ref 25.2–37.3)
APTT PPP: 52.7 SECONDS (ref 25.2–37.3)
APTT PPP: 55.4 SECONDS (ref 25.2–37.3)
APTT PPP: >200 SECONDS (ref 25.2–37.3)
AST SERPL W P-5'-P-CCNC: 24 U/L (ref 15–37)
AV INDEX (PROSTH): 0.76
AV MEAN GRADIENT: 3 MMHG
AV PEAK GRADIENT: 4 MMHG
AV VALVE AREA BY VELOCITY RATIO: 2.21 CM²
AV VALVE AREA: 2.36 CM²
AV VELOCITY RATIO: 0.71
BASOPHILS # BLD AUTO: 0.1 K/UL (ref 0–0.2)
BASOPHILS # BLD AUTO: 0.14 K/UL (ref 0–0.2)
BASOPHILS NFR BLD AUTO: 1 % (ref 0–1)
BASOPHILS NFR BLD AUTO: 1.3 % (ref 0–1)
BILIRUB SERPL-MCNC: 0.5 MG/DL (ref ?–1.2)
BSA FOR ECHO PROCEDURE: 2.01 M2
BUN SERPL-MCNC: 15 MG/DL (ref 7–18)
BUN SERPL-MCNC: 15 MG/DL (ref 7–18)
BUN/CREAT SERPL: 11 (ref 6–20)
BUN/CREAT SERPL: 13 (ref 6–20)
CALCIUM SERPL-MCNC: 9 MG/DL (ref 8.5–10.1)
CALCIUM SERPL-MCNC: 9.1 MG/DL (ref 8.5–10.1)
CHLORIDE SERPL-SCNC: 104 MMOL/L (ref 98–107)
CHLORIDE SERPL-SCNC: 104 MMOL/L (ref 98–107)
CHOLEST SERPL-MCNC: 128 MG/DL (ref 0–200)
CHOLEST/HDLC SERPL: 2.9 {RATIO}
CO2 SERPL-SCNC: 27 MMOL/L (ref 21–32)
CO2 SERPL-SCNC: 29 MMOL/L (ref 21–32)
CREAT SERPL-MCNC: 1.17 MG/DL (ref 0.7–1.3)
CREAT SERPL-MCNC: 1.35 MG/DL (ref 0.7–1.3)
CV ECHO LV RWT: 0.49 CM
DIFFERENTIAL METHOD BLD: ABNORMAL
DIFFERENTIAL METHOD BLD: ABNORMAL
DOP CALC AO PEAK VEL: 1 M/S
DOP CALC AO VTI: 21.3 CM
DOP CALC LVOT AREA: 3.1 CM2
DOP CALC LVOT DIAMETER: 1.99 CM
DOP CALC LVOT PEAK VEL: 0.71 M/S
DOP CALC LVOT STROKE VOLUME: 50.36 CM3
DOP CALCLVOT PEAK VEL VTI: 16.2 CM
E WAVE DECELERATION TIME: 206.72 MSEC
E/A RATIO: 0.65
E/E' RATIO: 9.54 M/S
ECHO LV POSTERIOR WALL: 1.23 CM (ref 0.6–1.1)
EGFR (NO RACE VARIABLE) (RUSH/TITUS): 58 ML/MIN/1.73M2
EGFR (NO RACE VARIABLE) (RUSH/TITUS): 69 ML/MIN/1.73M2
EJECTION FRACTION: 55 %
EOSINOPHIL # BLD AUTO: 0.32 K/UL (ref 0–0.5)
EOSINOPHIL # BLD AUTO: 0.41 K/UL (ref 0–0.5)
EOSINOPHIL NFR BLD AUTO: 3.1 % (ref 1–4)
EOSINOPHIL NFR BLD AUTO: 3.8 % (ref 1–4)
ERYTHROCYTE [DISTWIDTH] IN BLOOD BY AUTOMATED COUNT: 12.5 % (ref 11.5–14.5)
ERYTHROCYTE [DISTWIDTH] IN BLOOD BY AUTOMATED COUNT: 12.6 % (ref 11.5–14.5)
EST. AVERAGE GLUCOSE BLD GHB EST-MCNC: 186 MG/DL
FRACTIONAL SHORTENING: 10 % (ref 28–44)
GLOBULIN SER-MCNC: 3.1 G/DL (ref 2–4)
GLUCOSE SERPL-MCNC: 154 MG/DL (ref 70–105)
GLUCOSE SERPL-MCNC: 193 MG/DL (ref 70–105)
GLUCOSE SERPL-MCNC: 202 MG/DL (ref 70–105)
GLUCOSE SERPL-MCNC: 225 MG/DL (ref 70–105)
GLUCOSE SERPL-MCNC: 229 MG/DL (ref 74–106)
GLUCOSE SERPL-MCNC: 286 MG/DL (ref 74–106)
HBA1C MFR BLD HPLC: 8.1 % (ref 4.5–6.6)
HCT VFR BLD AUTO: 45.3 % (ref 40–54)
HCT VFR BLD AUTO: 49.2 % (ref 40–54)
HDLC SERPL-MCNC: 44 MG/DL (ref 40–60)
HGB BLD-MCNC: 15 G/DL (ref 13.5–18)
HGB BLD-MCNC: 15.8 G/DL (ref 13.5–18)
IMM GRANULOCYTES # BLD AUTO: 0.04 K/UL (ref 0–0.04)
IMM GRANULOCYTES # BLD AUTO: 0.05 K/UL (ref 0–0.04)
IMM GRANULOCYTES NFR BLD: 0.4 % (ref 0–0.4)
IMM GRANULOCYTES NFR BLD: 0.5 % (ref 0–0.4)
INDIRECT COOMBS: NORMAL
INR BLD: 0.91
INR BLD: 0.95
INTERVENTRICULAR SEPTUM: 1.05 CM (ref 0.6–1.1)
IVC DIAMETER: 1.64 CM
LA MAJOR: 3.68 CM
LDLC SERPL CALC-MCNC: 59 MG/DL
LDLC/HDLC SERPL: 1.3 {RATIO}
LEFT ATRIUM SIZE: 3.05 CM
LEFT INTERNAL DIMENSION IN SYSTOLE: 4.5 CM (ref 2.1–4)
LEFT VENTRICLE DIASTOLIC VOLUME INDEX: 60.16 ML/M2
LEFT VENTRICLE DIASTOLIC VOLUME: 118.51 ML
LEFT VENTRICLE MASS INDEX: 110 G/M2
LEFT VENTRICLE SYSTOLIC VOLUME INDEX: 47 ML/M2
LEFT VENTRICLE SYSTOLIC VOLUME: 92.56 ML
LEFT VENTRICULAR INTERNAL DIMENSION IN DIASTOLE: 5 CM (ref 3.5–6)
LEFT VENTRICULAR MASS: 217.61 G
LV LATERAL E/E' RATIO: 8.86 M/S
LV SEPTAL E/E' RATIO: 10.33 M/S
LVOT MG: 1.14 MMHG
LVOT MV: 0.5 CM/S
LYMPHOCYTES # BLD AUTO: 2.31 K/UL (ref 1–4.8)
LYMPHOCYTES # BLD AUTO: 3.56 K/UL (ref 1–4.8)
LYMPHOCYTES NFR BLD AUTO: 22.6 % (ref 27–41)
LYMPHOCYTES NFR BLD AUTO: 32.7 % (ref 27–41)
MCH RBC QN AUTO: 30.3 PG (ref 27–31)
MCH RBC QN AUTO: 30.7 PG (ref 27–31)
MCHC RBC AUTO-ENTMCNC: 32.1 G/DL (ref 32–36)
MCHC RBC AUTO-ENTMCNC: 33.1 G/DL (ref 32–36)
MCV RBC AUTO: 92.6 FL (ref 80–96)
MCV RBC AUTO: 94.3 FL (ref 80–96)
MONOCYTES # BLD AUTO: 0.87 K/UL (ref 0–0.8)
MONOCYTES # BLD AUTO: 1.04 K/UL (ref 0–0.8)
MONOCYTES NFR BLD AUTO: 8.5 % (ref 2–6)
MONOCYTES NFR BLD AUTO: 9.5 % (ref 2–6)
MPC BLD CALC-MCNC: 8.6 FL (ref 9.4–12.4)
MPC BLD CALC-MCNC: 9 FL (ref 9.4–12.4)
MV PEAK A VEL: 0.95 M/S
MV PEAK E VEL: 0.62 M/S
NEUTROPHILS # BLD AUTO: 5.7 K/UL (ref 1.8–7.7)
NEUTROPHILS # BLD AUTO: 6.57 K/UL (ref 1.8–7.7)
NEUTROPHILS NFR BLD AUTO: 52.2 % (ref 53–65)
NEUTROPHILS NFR BLD AUTO: 64.4 % (ref 53–65)
NONHDLC SERPL-MCNC: 84 MG/DL
NRBC # BLD AUTO: 0 X10E3/UL
NRBC # BLD AUTO: 0 X10E3/UL
NRBC, AUTO (.00): 0 %
NRBC, AUTO (.00): 0 %
OHS CV RV/LV RATIO: 0.64 CM
OHS QRS DURATION: 104 MS
OHS QRS DURATION: 92 MS
OHS QTC CALCULATION: 440 MS
OHS QTC CALCULATION: 444 MS
PLATELET # BLD AUTO: 212 K/UL (ref 150–400)
PLATELET # BLD AUTO: 222 K/UL (ref 150–400)
POTASSIUM SERPL-SCNC: 4.6 MMOL/L (ref 3.5–5.1)
POTASSIUM SERPL-SCNC: 4.7 MMOL/L (ref 3.5–5.1)
PROT SERPL-MCNC: 6.9 G/DL (ref 6.4–8.2)
PROTHROMBIN TIME: 12.2 SECONDS (ref 11.7–14.7)
PROTHROMBIN TIME: 12.6 SECONDS (ref 11.7–14.7)
PV PEAK GRADIENT: 2 MMHG
PV PEAK VELOCITY: 0.64 M/S
RA MAJOR: 3.84 CM
RA PRESSURE ESTIMATED: 3 MMHG
RBC # BLD AUTO: 4.89 M/UL (ref 4.6–6.2)
RBC # BLD AUTO: 5.22 M/UL (ref 4.6–6.2)
RH BLD: NORMAL
RIGHT VENTRICULAR END-DIASTOLIC DIMENSION: 3.18 CM
SARS-COV-2 RDRP RESP QL NAA+PROBE: NEGATIVE
SODIUM SERPL-SCNC: 139 MMOL/L (ref 136–145)
SODIUM SERPL-SCNC: 139 MMOL/L (ref 136–145)
SPECIMEN OUTDATE: NORMAL
TDI LATERAL: 0.07 M/S
TDI SEPTAL: 0.06 M/S
TDI: 0.07 M/S
TRICUSPID ANNULAR PLANE SYSTOLIC EXCURSION: 1.69 CM
TRIGL SERPL-MCNC: 126 MG/DL (ref 35–150)
TROPONIN I SERPL DL<=0.01 NG/ML-MCNC: 1165.4 PG/ML
TROPONIN I SERPL DL<=0.01 NG/ML-MCNC: 1261.3 PG/ML
TROPONIN I SERPL DL<=0.01 NG/ML-MCNC: 1535.9 PG/ML
TROPONIN I SERPL DL<=0.01 NG/ML-MCNC: 2036.7 PG/ML
VLDLC SERPL-MCNC: 25 MG/DL
WBC # BLD AUTO: 10.21 K/UL (ref 4.5–11)
WBC # BLD AUTO: 10.9 K/UL (ref 4.5–11)
Z-SCORE OF LEFT VENTRICULAR DIMENSION IN END DIASTOLE: -1.24
Z-SCORE OF LEFT VENTRICULAR DIMENSION IN END SYSTOLE: 2.03

## 2024-05-25 PROCEDURE — 85025 COMPLETE CBC W/AUTO DIFF WBC: CPT | Performed by: INTERNAL MEDICINE

## 2024-05-25 PROCEDURE — 99285 EMERGENCY DEPT VISIT HI MDM: CPT | Mod: 25

## 2024-05-25 PROCEDURE — 63600175 PHARM REV CODE 636 W HCPCS: Performed by: INTERNAL MEDICINE

## 2024-05-25 PROCEDURE — 94761 N-INVAS EAR/PLS OXIMETRY MLT: CPT

## 2024-05-25 PROCEDURE — 36415 COLL VENOUS BLD VENIPUNCTURE: CPT | Performed by: INTERNAL MEDICINE

## 2024-05-25 PROCEDURE — 25000003 PHARM REV CODE 250: Performed by: INTERNAL MEDICINE

## 2024-05-25 PROCEDURE — 83036 HEMOGLOBIN GLYCOSYLATED A1C: CPT | Performed by: INTERNAL MEDICINE

## 2024-05-25 PROCEDURE — 84484 ASSAY OF TROPONIN QUANT: CPT

## 2024-05-25 PROCEDURE — 84484 ASSAY OF TROPONIN QUANT: CPT | Performed by: EMERGENCY MEDICINE

## 2024-05-25 PROCEDURE — 80061 LIPID PANEL: CPT | Performed by: INTERNAL MEDICINE

## 2024-05-25 PROCEDURE — 99223 1ST HOSP IP/OBS HIGH 75: CPT | Mod: AI,GC,, | Performed by: INTERNAL MEDICINE

## 2024-05-25 PROCEDURE — 93005 ELECTROCARDIOGRAM TRACING: CPT

## 2024-05-25 PROCEDURE — 25000242 PHARM REV CODE 250 ALT 637 W/ HCPCS: Performed by: INTERNAL MEDICINE

## 2024-05-25 PROCEDURE — 85025 COMPLETE CBC W/AUTO DIFF WBC: CPT | Performed by: EMERGENCY MEDICINE

## 2024-05-25 PROCEDURE — 85610 PROTHROMBIN TIME: CPT | Performed by: EMERGENCY MEDICINE

## 2024-05-25 PROCEDURE — 84484 ASSAY OF TROPONIN QUANT: CPT | Performed by: INTERNAL MEDICINE

## 2024-05-25 PROCEDURE — 85730 THROMBOPLASTIN TIME PARTIAL: CPT | Performed by: STUDENT IN AN ORGANIZED HEALTH CARE EDUCATION/TRAINING PROGRAM

## 2024-05-25 PROCEDURE — 86901 BLOOD TYPING SEROLOGIC RH(D): CPT | Performed by: INTERNAL MEDICINE

## 2024-05-25 PROCEDURE — 93010 ELECTROCARDIOGRAM REPORT: CPT | Mod: 76,,, | Performed by: INTERNAL MEDICINE

## 2024-05-25 PROCEDURE — 80048 BASIC METABOLIC PNL TOTAL CA: CPT | Mod: XB | Performed by: INTERNAL MEDICINE

## 2024-05-25 PROCEDURE — 36415 COLL VENOUS BLD VENIPUNCTURE: CPT | Performed by: EMERGENCY MEDICINE

## 2024-05-25 PROCEDURE — 82962 GLUCOSE BLOOD TEST: CPT

## 2024-05-25 PROCEDURE — 85610 PROTHROMBIN TIME: CPT | Performed by: INTERNAL MEDICINE

## 2024-05-25 PROCEDURE — 99900035 HC TECH TIME PER 15 MIN (STAT)

## 2024-05-25 PROCEDURE — 27000221 HC OXYGEN, UP TO 24 HOURS

## 2024-05-25 PROCEDURE — 11000001 HC ACUTE MED/SURG PRIVATE ROOM

## 2024-05-25 PROCEDURE — 85730 THROMBOPLASTIN TIME PARTIAL: CPT | Performed by: INTERNAL MEDICINE

## 2024-05-25 PROCEDURE — 87635 SARS-COV-2 COVID-19 AMP PRB: CPT | Performed by: EMERGENCY MEDICINE

## 2024-05-25 PROCEDURE — 80053 COMPREHEN METABOLIC PANEL: CPT | Performed by: EMERGENCY MEDICINE

## 2024-05-25 PROCEDURE — 25000003 PHARM REV CODE 250: Performed by: EMERGENCY MEDICINE

## 2024-05-25 RX ORDER — ACETAMINOPHEN 325 MG/1
650 TABLET ORAL EVERY 4 HOURS PRN
Status: DISCONTINUED | OUTPATIENT
Start: 2024-05-25 | End: 2024-05-30 | Stop reason: HOSPADM

## 2024-05-25 RX ORDER — MORPHINE SULFATE 4 MG/ML
4 INJECTION, SOLUTION INTRAMUSCULAR; INTRAVENOUS EVERY 4 HOURS PRN
Status: DISCONTINUED | OUTPATIENT
Start: 2024-05-25 | End: 2024-05-30 | Stop reason: HOSPADM

## 2024-05-25 RX ORDER — METOPROLOL TARTRATE 25 MG/1
12.5 TABLET ORAL 2 TIMES DAILY
Status: DISCONTINUED | OUTPATIENT
Start: 2024-05-25 | End: 2024-05-29

## 2024-05-25 RX ORDER — NITROGLYCERIN 0.4 MG/1
0.4 TABLET SUBLINGUAL EVERY 5 MIN PRN
Status: DISCONTINUED | OUTPATIENT
Start: 2024-05-25 | End: 2024-05-30 | Stop reason: HOSPADM

## 2024-05-25 RX ORDER — CETIRIZINE HYDROCHLORIDE 10 MG/1
10 TABLET ORAL DAILY
Status: DISCONTINUED | OUTPATIENT
Start: 2024-05-25 | End: 2024-05-30 | Stop reason: HOSPADM

## 2024-05-25 RX ORDER — ATORVASTATIN CALCIUM 80 MG/1
80 TABLET, FILM COATED ORAL DAILY
Status: DISCONTINUED | OUTPATIENT
Start: 2024-05-25 | End: 2024-05-30 | Stop reason: HOSPADM

## 2024-05-25 RX ORDER — PANTOPRAZOLE SODIUM 40 MG/1
40 TABLET, DELAYED RELEASE ORAL DAILY
Status: DISCONTINUED | OUTPATIENT
Start: 2024-05-25 | End: 2024-05-30 | Stop reason: HOSPADM

## 2024-05-25 RX ORDER — ASPIRIN 81 MG/1
81 TABLET ORAL DAILY
Status: DISCONTINUED | OUTPATIENT
Start: 2024-05-25 | End: 2024-05-30 | Stop reason: HOSPADM

## 2024-05-25 RX ORDER — MONTELUKAST SODIUM 10 MG/1
10 TABLET ORAL DAILY
Status: DISCONTINUED | OUTPATIENT
Start: 2024-05-25 | End: 2024-05-30 | Stop reason: HOSPADM

## 2024-05-25 RX ORDER — FINASTERIDE 5 MG/1
5 TABLET, FILM COATED ORAL DAILY
Status: DISCONTINUED | OUTPATIENT
Start: 2024-05-25 | End: 2024-05-30 | Stop reason: HOSPADM

## 2024-05-25 RX ORDER — INSULIN ASPART 100 [IU]/ML
0-5 INJECTION, SOLUTION INTRAVENOUS; SUBCUTANEOUS EVERY 6 HOURS PRN
Status: DISCONTINUED | OUTPATIENT
Start: 2024-05-25 | End: 2024-05-30 | Stop reason: HOSPADM

## 2024-05-25 RX ORDER — HYDROCODONE BITARTRATE AND ACETAMINOPHEN 5; 325 MG/1; MG/1
1 TABLET ORAL EVERY 4 HOURS PRN
Status: DISCONTINUED | OUTPATIENT
Start: 2024-05-25 | End: 2024-05-30 | Stop reason: HOSPADM

## 2024-05-25 RX ORDER — GLUCAGON 1 MG
1 KIT INJECTION
Status: DISCONTINUED | OUTPATIENT
Start: 2024-05-25 | End: 2024-05-30 | Stop reason: HOSPADM

## 2024-05-25 RX ORDER — SODIUM CHLORIDE 0.9 % (FLUSH) 0.9 %
10 SYRINGE (ML) INJECTION
Status: DISCONTINUED | OUTPATIENT
Start: 2024-05-25 | End: 2024-05-30 | Stop reason: HOSPADM

## 2024-05-25 RX ORDER — ONDANSETRON HYDROCHLORIDE 2 MG/ML
4 INJECTION, SOLUTION INTRAVENOUS EVERY 8 HOURS PRN
Status: DISCONTINUED | OUTPATIENT
Start: 2024-05-25 | End: 2024-05-30 | Stop reason: HOSPADM

## 2024-05-25 RX ORDER — HEPARIN SODIUM,PORCINE/D5W 25000/250
0-40 INTRAVENOUS SOLUTION INTRAVENOUS CONTINUOUS
Status: DISCONTINUED | OUTPATIENT
Start: 2024-05-25 | End: 2024-05-27

## 2024-05-25 RX ORDER — LISINOPRIL 5 MG/1
5 TABLET ORAL DAILY
Status: DISCONTINUED | OUTPATIENT
Start: 2024-05-25 | End: 2024-05-29

## 2024-05-25 RX ORDER — SODIUM CHLORIDE, SODIUM LACTATE, POTASSIUM CHLORIDE, CALCIUM CHLORIDE 600; 310; 30; 20 MG/100ML; MG/100ML; MG/100ML; MG/100ML
INJECTION, SOLUTION INTRAVENOUS CONTINUOUS
Status: DISCONTINUED | OUTPATIENT
Start: 2024-05-25 | End: 2024-05-28

## 2024-05-25 RX ORDER — ASPIRIN 325 MG
325 TABLET ORAL
Status: COMPLETED | OUTPATIENT
Start: 2024-05-25 | End: 2024-05-25

## 2024-05-25 RX ORDER — CITALOPRAM 20 MG/1
40 TABLET, FILM COATED ORAL DAILY
Status: DISCONTINUED | OUTPATIENT
Start: 2024-05-25 | End: 2024-05-30 | Stop reason: HOSPADM

## 2024-05-25 RX ORDER — IPRATROPIUM BROMIDE AND ALBUTEROL SULFATE 2.5; .5 MG/3ML; MG/3ML
3 SOLUTION RESPIRATORY (INHALATION) EVERY 4 HOURS PRN
Status: DISCONTINUED | OUTPATIENT
Start: 2024-05-25 | End: 2024-05-30 | Stop reason: HOSPADM

## 2024-05-25 RX ORDER — PROCHLORPERAZINE EDISYLATE 5 MG/ML
5 INJECTION INTRAMUSCULAR; INTRAVENOUS EVERY 6 HOURS PRN
Status: DISCONTINUED | OUTPATIENT
Start: 2024-05-25 | End: 2024-05-30 | Stop reason: HOSPADM

## 2024-05-25 RX ADMIN — MONTELUKAST 10 MG: 10 TABLET, FILM COATED ORAL at 10:05

## 2024-05-25 RX ADMIN — METOPROLOL TARTRATE 12.5 MG: 25 TABLET, FILM COATED ORAL at 10:05

## 2024-05-25 RX ADMIN — INSULIN ASPART 2 UNITS: 100 INJECTION, SOLUTION INTRAVENOUS; SUBCUTANEOUS at 05:05

## 2024-05-25 RX ADMIN — METOPROLOL TARTRATE 12.5 MG: 25 TABLET, FILM COATED ORAL at 09:05

## 2024-05-25 RX ADMIN — ASPIRIN 325 MG ORAL TABLET 325 MG: 325 PILL ORAL at 03:05

## 2024-05-25 RX ADMIN — HEPARIN SODIUM 12 UNITS/KG/HR: 10000 INJECTION, SOLUTION INTRAVENOUS at 06:05

## 2024-05-25 RX ADMIN — SODIUM CHLORIDE, POTASSIUM CHLORIDE, SODIUM LACTATE AND CALCIUM CHLORIDE: 600; 310; 30; 20 INJECTION, SOLUTION INTRAVENOUS at 04:05

## 2024-05-25 RX ADMIN — FINASTERIDE 5 MG: 5 TABLET, FILM COATED ORAL at 10:05

## 2024-05-25 RX ADMIN — LISINOPRIL 5 MG: 5 TABLET ORAL at 10:05

## 2024-05-25 RX ADMIN — ATORVASTATIN CALCIUM 80 MG: 80 TABLET, FILM COATED ORAL at 10:05

## 2024-05-25 RX ADMIN — HEPARIN SODIUM 14 UNITS/KG/HR: 10000 INJECTION, SOLUTION INTRAVENOUS at 09:05

## 2024-05-25 RX ADMIN — NITROGLYCERIN 0.4 MG: 0.4 TABLET SUBLINGUAL at 05:05

## 2024-05-25 RX ADMIN — ASPIRIN 81 MG: 81 TABLET, COATED ORAL at 10:05

## 2024-05-25 RX ADMIN — TICAGRELOR 180 MG: 90 TABLET ORAL at 06:05

## 2024-05-25 RX ADMIN — SODIUM CHLORIDE, POTASSIUM CHLORIDE, SODIUM LACTATE AND CALCIUM CHLORIDE: 600; 310; 30; 20 INJECTION, SOLUTION INTRAVENOUS at 05:05

## 2024-05-25 RX ADMIN — INSULIN ASPART 2 UNITS: 100 INJECTION, SOLUTION INTRAVENOUS; SUBCUTANEOUS at 06:05

## 2024-05-25 RX ADMIN — CITALOPRAM HYDROBROMIDE 40 MG: 20 TABLET, FILM COATED ORAL at 10:05

## 2024-05-25 RX ADMIN — CETIRIZINE HYDROCHLORIDE 10 MG: 10 TABLET, FILM COATED ORAL at 10:05

## 2024-05-25 RX ADMIN — HEPARIN SODIUM 12.7 UNITS/KG/HR: 10000 INJECTION, SOLUTION INTRAVENOUS at 02:05

## 2024-05-25 RX ADMIN — PANTOPRAZOLE SODIUM 40 MG: 40 TABLET, DELAYED RELEASE ORAL at 10:05

## 2024-05-25 RX ADMIN — TICAGRELOR 90 MG: 90 TABLET ORAL at 09:05

## 2024-05-25 NOTE — LETTER
Fax Transmission                                                                                                                                                       Date: May 29, 2024       To:     [facility]  FROM: [facility]   Fax:  1 [phone] Fax: [phone]   Phone:   Phone: [phone]     FACESHEET                             IF THERE ARE ANY PROBLEMS WITH THIS TRANSMISSION, PLEASE CALL IMMEDIATELY. THANK YOU    CONFIDENTIALITY NOTICE: The accompanying facsimile is intended solely for the use of the recipient designated above. Document(s) transmitted herewith may contain information that is confidential and privileged. Delivery, distribution of dissemination of this communication other than to the intended recipient is strictly prohibited. If you are another healthcare provider and have received this facsimile in error, please properly dispose and notify the sender. If you are NOT a healthcare provider and have received this facsimile in error, please notify [facility]s Compliance & Privacy Department immediately by email at [email or web address]

## 2024-05-25 NOTE — H&P
[facility] - Emergency Department  Hospital Medicine  History & Physical    Patient Name: Richard Soliz  MRN: [id number]  Patient Class: IP- Inpatient  Admission Date: 5/25/2024  Attending Physician: Reji Ly Jr., MD   Primary Care Provider: Valentina Mcintyre FNP         Patient information was obtained from patient, spouse/SO, past medical records, and ER records.     Subjective:     Principal Problem:NSTEMI (non-ST elevated myocardial infarction)    Chief Complaint:   Chief Complaint   Patient presents with    Chest Pain     Chest pain starting 2 days ago. Took otc mylanta w/ improvement of symptoms.         HPI: Patient is a 67 yo male with PMH of DM, BPH, anxiety, and depression. He presented to the [facility] with the complaint of chest pain that started about 2 days ago. He stated that it started off as chest tightness located in the mid of his chest. He graded it being 6-7/10. The pain was episodic with a single episode lasting about 30 minutes. The pain was non radiating. The pain was relieved by Mylanta. Patient denied accompanying SOB, palpitations, leg swelling, N/V, dizziness/lightheadedness, or syncope.  Tonight, he was woken up by the same symptoms but with increased intensity. This time pain radiated to his right arm and neck.  Patient reported orthopnea and excessive tiredness for past few months.  Patient doesn't see a cardiologist. He has family history of cardiac diseases.  Patient lives with wife. He is ambulatory and independent in ADLs.  ED course:  P        BP         Temp          pO2  75 141/75     afebrile   94 %  1st troponin: 1535.9  EKG: Abnormal EKG  CBC  10.90 15.8 222    49.2         BMP  139 104 15 286   4.7 27 1.35       Patient is being admitted to Hospital Medicine Service under care for further evaluation and management.            Past Medical History:   Diagnosis Date    Acquired asymmetry of prostate     Acute prostatitis 10/29/2021    Allergic rhinitis      Asthma     BPH with obstruction/lower urinary tract symptoms 10/29/2021    [facility]     Community acquired pneumonia of left lower lobe of lung 2018    COPD (chronic obstructive pulmonary disease)     Essential hypertension     GERD (gastroesophageal reflux disease)     Hyperlipidemia     Personal history of colonic polyps 01/16/2017    repeat colonoscopy in 3 years    Type 2 diabetes mellitus     Vitamin D deficiency        Past Surgical History:   Procedure Laterality Date    COLONOSCOPY W/ BIOPSIES  01/16/2017    repeat in 3 years    nose surgery for broken nose as a child      right carpal tunnel surgery  03/2021    TONSILLECTOMY AND ADENOIDECTOMY         Review of patient's allergies indicates:  No Known Allergies    Current Facility-Administered Medications on File Prior to Encounter   Medication    BUPivacaine (PF) 0.25% (2.5 mg/ml) injection 1 mL    BUPivacaine (PF) 0.25% (2.5 mg/ml) injection 1 mL    BUPivacaine (PF) 0.25% (2.5 mg/ml) injection 1 mL    triamcinolone acetonide injection 40 mg    triamcinolone acetonide injection 40 mg    triamcinolone acetonide injection 40 mg     Current Outpatient Medications on File Prior to Encounter   Medication Sig    albuterol (PROVENTIL HFA) 90 mcg/actuation inhaler Inhale 2 puffs into the lungs every 6 (six) hours as needed for Wheezing. Rescue    C,E,zinc,copper 11-omega3s-lut (OCUVITE ADULT 50 PLUS) 250-5-1 mg Cap Take 1 tablet by mouth once daily.    cetirizine (ZYRTEC) 10 MG tablet TAKE ONE (1) TABLET  BY MOUTH ONCE DAILY.    cholecalciferol, vitamin D3, 125 mcg (5,000 unit) Tab Take 5,000 Units by mouth once daily.    cinnamon bark-chromium picolin 500-100 mg-mcg Cap Take 1 capsule by mouth once daily.    citalopram (CELEXA) 40 MG tablet Take 1 tablet (40 mg total) by mouth once daily.    empagliflozin (JARDIANCE) 25 mg tablet Take 1 tablet (25 mg total) by mouth once daily.    finasteride (PROSCAR) 5 mg tablet Take 1 tablet (5 mg total) by mouth once daily.     glucosamine-chondroitin 500-400 mg tablet Take 1 tablet by mouth 3 (three) times daily.    meloxicam (MOBIC) 7.5 MG tablet Take 1 tablet (7.5 mg total) by mouth once daily.    metFORMIN (GLUCOPHAGE) 1000 MG tablet Take 1 tablet (1,000 mg total) by mouth 2 (two) times a day.    montelukast (SINGULAIR) 10 mg tablet Take 1 tablet (10 mg total) by mouth once daily.    omeprazole (PRILOSEC) 20 MG capsule Take 20 mg by mouth once daily.    potassium citrate 99 mg Cap Take by mouth.    rosuvastatin (CRESTOR) 10 MG tablet Take 1 tablet (10 mg total) by mouth every evening.     Family History       Problem Relation (Age of Onset)    Cancer Father    Diabetes Mother, Father    Heart failure Father    Hyperlipidemia Mother    Hypertension Mother, Father, Brother    Multiple sclerosis Sister    Nephrolithiasis Brother          Tobacco Use    Smoking status: Former    Smokeless tobacco: Never    Tobacco comments:     quit smoking 26 years ago   Substance and Sexual Activity    Alcohol use: Not Currently    Drug use: Never    Sexual activity: Not Currently     Review of Systems   Constitutional:  Positive for fatigue. Negative for activity change, appetite change, chills, diaphoresis and fever.   HENT:  Negative for congestion.    Eyes: Negative.    Respiratory:  Positive for chest tightness. Negative for cough, choking, shortness of breath, wheezing and stridor.    Cardiovascular:  Positive for chest pain. Negative for palpitations and leg swelling.   Gastrointestinal:  Negative for abdominal distention, abdominal pain, anal bleeding, blood in stool, constipation, nausea and vomiting.   Genitourinary:  Negative for dysuria, flank pain, frequency, hematuria and urgency.   Musculoskeletal:  Negative for back pain.   Skin: Negative.    Neurological:  Positive for weakness. Negative for light-headedness, numbness and headaches.   Hematological:  Negative for adenopathy.   Psychiatric/Behavioral: Negative.       Objective:      Vital Signs (Most Recent):  Temp: 97.7 °F (36.5 °C) (05/25/24 0319)  Pulse: 68 (05/25/24 0534)  Resp: (!) 8 (05/25/24 0534)  BP: 135/87 (05/25/24 0534)  SpO2: 95 % (05/25/24 0534) Vital Signs (24h Range):  Temp:  [97.7 °F (36.5 °C)] 97.7 °F (36.5 °C)  Pulse:  [68-95] 68  Resp:  [8-16] 8  SpO2:  [90 %-96 %] 95 %  BP: (135-172)/() 135/87     Weight: 87.1 kg (192 lb)  Body mass index is 30.99 kg/m².     Physical Exam  Vitals and nursing note reviewed.   Constitutional:       General: He is not in acute distress.     Appearance: Normal appearance. He is not ill-appearing or diaphoretic.   HENT:      Head: Normocephalic and atraumatic.      Nose: No congestion or rhinorrhea.   Eyes:      Extraocular Movements: Extraocular movements intact and EOM normal.      Conjunctiva/sclera: Conjunctivae normal.      Pupils: Pupils are equal, round, and reactive to light.   Cardiovascular:      Rate and Rhythm: Normal rate and regular rhythm.      Pulses: Normal pulses.      Heart sounds: Normal heart sounds. No murmur heard.  Pulmonary:      Effort: Pulmonary effort is normal. No respiratory distress.      Breath sounds: Normal breath sounds. No wheezing.   Abdominal:      General: Bowel sounds are normal. There is no distension.      Palpations: Abdomen is soft.      Tenderness: There is no abdominal tenderness.   Musculoskeletal:      Cervical back: Normal range of motion and neck supple. No rigidity.      Right lower leg: No edema.      Left lower leg: No edema.   Skin:     General: Skin is warm.      Capillary Refill: Capillary refill takes less than 2 seconds.   Neurological:      General: No focal deficit present.      Mental Status: He is alert and oriented to person, place, and time.              CRANIAL NERVES     CN I  cranial nerve I not tested    CN III, IV, VI   Pupils are equal, round, and reactive to light.  Extraocular motions are normal.     CN V   Facial sensation intact.   Right corneal reflex:  "normal  Left corneal reflex: normal    CN VII   Right facial weakness: none  Left facial weakness: none    CN VIII   Hearing: intact       Significant Labs: All pertinent labs within the past 24 hours have been reviewed.    Significant Imaging: I have reviewed all pertinent imaging results/findings within the past 24 hours.  Assessment/Plan:     * NSTEMI (non-ST elevated myocardial infarction)  Patient presented to the [facility] ED with complaints of chest pain.  - 1st troponin was 1535.9, second reading pending  - EKG showed T wave inversions  in anterolateral leads  - ECHO pending  - patient on cardiac monitoring  - cardiology consulted  - patient is on ACS protocol        Diabetes mellitus without complication  Patient's FSGs are uncontrolled due to hyperglycemia on current medication regimen.  Last A1c reviewed-   Lab Results   Component Value Date    HGBA1C 8.3 (H) 01/22/2024     Most recent fingerstick glucose reviewed- No results for input(s): "POCTGLUCOSE" in the last 24 hours.  Current correctional scale  Low  Maintain anti-hyperglycemic dose as follows-   Antihyperglycemics (From admission, onward)      Start     Stop Route Frequency Ordered    05/25/24 0631  insulin aspart U-100 injection 0-5 Units         -- SubQ Every 6 hours PRN 05/25/24 0532          Hold Oral hypoglycemics while patient is in the hospital.    Anxiety    Continued home medications    BPH with obstruction/lower urinary tract symptoms    Continue home medications      VTE Risk Mitigation (From admission, onward)           Ordered     heparin 25,000 units in dextrose 5% 250 mL (100 units/mL) infusion LOW INTENSITY nomogram - [facility]  Continuous        Question:  Begin at (units/kg/hr)  Answer:  12    05/25/24 0531     heparin 25,000 units in dextrose 5% (100 units/ml) IV bolus from bag LOW INTENSITY nomogram - [facility]  As needed (PRN)        Question:  Heparin Infusion Adjustment (DO NOT MODIFY ANSWER)  Answer:  " [email or web address]\epic\Images\Pharmacy\HeparinInfusions\heparin LOW INTENSITY nomogram for [facility] [id number]    05/25/24 0531     heparin 25,000 units in dextrose 5% (100 units/ml) IV bolus from bag LOW INTENSITY nomogram - [facility]  As needed (PRN)        Question:  Heparin Infusion Adjustment (DO NOT MODIFY ANSWER)  Answer:  \\[email or web address]\epic\Images\Pharmacy\HeparinInfusions\heparin LOW INTENSITY nomogram for [facility] [id number]    05/25/24 0531     Reason for No Pharmacological VTE Prophylaxis  Once        Comments: Patient will be on heparin infusion for ACS   Question:  Reasons:  Answer:  Physician Provided (leave comment)    05/25/24 0531     IP VTE HIGH RISK PATIENT  Once         05/25/24 0531     Place sequential compression device  Until discontinued         05/25/24 0531                                    Alexa Monaco MD  Department of Hospital Medicine  [facility] - Emergency Department

## 2024-05-25 NOTE — ASSESSMENT & PLAN NOTE
Patient presented to the [facility] ED with complaints of chest pain.  - 1st troponin was 1535.9, second reading pending  - EKG showed T wave inversions  in anterolateral leads  - ECHO pending  - patient on cardiac monitoring  - cardiology consulted  - patient is on ACS protocol

## 2024-05-25 NOTE — SUBJECTIVE & OBJECTIVE
Past Medical History:   Diagnosis Date    Acquired asymmetry of prostate     Acute prostatitis 10/29/2021    Allergic rhinitis     Asthma     BPH with obstruction/lower urinary tract symptoms 10/29/2021    [facility]     Community acquired pneumonia of left lower lobe of lung 2018    COPD (chronic obstructive pulmonary disease)     Essential hypertension     GERD (gastroesophageal reflux disease)     Hyperlipidemia     Personal history of colonic polyps 01/16/2017    repeat colonoscopy in 3 years    Type 2 diabetes mellitus     Vitamin D deficiency        Past Surgical History:   Procedure Laterality Date    COLONOSCOPY W/ BIOPSIES  01/16/2017    repeat in 3 years    nose surgery for broken nose as a child      right carpal tunnel surgery  03/2021    TONSILLECTOMY AND ADENOIDECTOMY         Review of patient's allergies indicates:  No Known Allergies    Current Facility-Administered Medications on File Prior to Encounter   Medication    BUPivacaine (PF) 0.25% (2.5 mg/ml) injection 1 mL    BUPivacaine (PF) 0.25% (2.5 mg/ml) injection 1 mL    BUPivacaine (PF) 0.25% (2.5 mg/ml) injection 1 mL    triamcinolone acetonide injection 40 mg    triamcinolone acetonide injection 40 mg    triamcinolone acetonide injection 40 mg     Current Outpatient Medications on File Prior to Encounter   Medication Sig    albuterol (PROVENTIL HFA) 90 mcg/actuation inhaler Inhale 2 puffs into the lungs every 6 (six) hours as needed for Wheezing. Rescue    C,E,zinc,copper 11-omega3s-lut (OCUVITE ADULT 50 PLUS) 250-5-1 mg Cap Take 1 tablet by mouth once daily.    cetirizine (ZYRTEC) 10 MG tablet TAKE ONE (1) TABLET  BY MOUTH ONCE DAILY.    cholecalciferol, vitamin D3, 125 mcg (5,000 unit) Tab Take 5,000 Units by mouth once daily.    cinnamon bark-chromium picolin 500-100 mg-mcg Cap Take 1 capsule by mouth once daily.    citalopram (CELEXA) 40 MG tablet Take 1 tablet (40 mg total) by mouth once daily.    empagliflozin (JARDIANCE) 25 mg tablet  Take 1 tablet (25 mg total) by mouth once daily.    finasteride (PROSCAR) 5 mg tablet Take 1 tablet (5 mg total) by mouth once daily.    glucosamine-chondroitin 500-400 mg tablet Take 1 tablet by mouth 3 (three) times daily.    meloxicam (MOBIC) 7.5 MG tablet Take 1 tablet (7.5 mg total) by mouth once daily.    metFORMIN (GLUCOPHAGE) 1000 MG tablet Take 1 tablet (1,000 mg total) by mouth 2 (two) times a day.    montelukast (SINGULAIR) 10 mg tablet Take 1 tablet (10 mg total) by mouth once daily.    omeprazole (PRILOSEC) 20 MG capsule Take 20 mg by mouth once daily.    potassium citrate 99 mg Cap Take by mouth.    rosuvastatin (CRESTOR) 10 MG tablet Take 1 tablet (10 mg total) by mouth every evening.     Family History       Problem Relation (Age of Onset)    Cancer Father    Diabetes Mother, Father    Heart failure Father    Hyperlipidemia Mother    Hypertension Mother, Father, Brother    Multiple sclerosis Sister    Nephrolithiasis Brother          Tobacco Use    Smoking status: Former    Smokeless tobacco: Never    Tobacco comments:     quit smoking 26 years ago   Substance and Sexual Activity    Alcohol use: Not Currently    Drug use: Never    Sexual activity: Not Currently     Review of Systems   Constitutional:  Positive for fatigue. Negative for activity change, appetite change, chills, diaphoresis and fever.   HENT:  Negative for congestion.    Eyes: Negative.    Respiratory:  Positive for chest tightness. Negative for cough, choking, shortness of breath, wheezing and stridor.    Cardiovascular:  Positive for chest pain. Negative for palpitations and leg swelling.   Gastrointestinal:  Negative for abdominal distention, abdominal pain, anal bleeding, blood in stool, constipation, nausea and vomiting.   Genitourinary:  Negative for dysuria, flank pain, frequency, hematuria and urgency.   Musculoskeletal:  Negative for back pain.   Skin: Negative.    Neurological:  Positive for weakness. Negative for  light-headedness, numbness and headaches.   Hematological:  Negative for adenopathy.   Psychiatric/Behavioral: Negative.       Objective:     Vital Signs (Most Recent):  Temp: 97.7 °F (36.5 °C) (05/25/24 0319)  Pulse: 68 (05/25/24 0534)  Resp: (!) 8 (05/25/24 0534)  BP: 135/87 (05/25/24 0534)  SpO2: 95 % (05/25/24 0534) Vital Signs (24h Range):  Temp:  [97.7 °F (36.5 °C)] 97.7 °F (36.5 °C)  Pulse:  [68-95] 68  Resp:  [8-16] 8  SpO2:  [90 %-96 %] 95 %  BP: (135-172)/() 135/87     Weight: 87.1 kg (192 lb)  Body mass index is 30.99 kg/m².     Physical Exam  Vitals and nursing note reviewed.   Constitutional:       General: He is not in acute distress.     Appearance: Normal appearance. He is not ill-appearing or diaphoretic.   HENT:      Head: Normocephalic and atraumatic.      Nose: No congestion or rhinorrhea.   Eyes:      Extraocular Movements: Extraocular movements intact and EOM normal.      Conjunctiva/sclera: Conjunctivae normal.      Pupils: Pupils are equal, round, and reactive to light.   Cardiovascular:      Rate and Rhythm: Normal rate and regular rhythm.      Pulses: Normal pulses.      Heart sounds: Normal heart sounds. No murmur heard.  Pulmonary:      Effort: Pulmonary effort is normal. No respiratory distress.      Breath sounds: Normal breath sounds. No wheezing.   Abdominal:      General: Bowel sounds are normal. There is no distension.      Palpations: Abdomen is soft.      Tenderness: There is no abdominal tenderness.   Musculoskeletal:      Cervical back: Normal range of motion and neck supple. No rigidity.      Right lower leg: No edema.      Left lower leg: No edema.   Skin:     General: Skin is warm.      Capillary Refill: Capillary refill takes less than 2 seconds.   Neurological:      General: No focal deficit present.      Mental Status: He is alert and oriented to person, place, and time.              CRANIAL NERVES     CN I  cranial nerve I not tested    CN III, IV, VI   Pupils are  equal, round, and reactive to light.  Extraocular motions are normal.     CN V   Facial sensation intact.   Right corneal reflex: normal  Left corneal reflex: normal    CN VII   Right facial weakness: none  Left facial weakness: none    CN VIII   Hearing: intact       Significant Labs: All pertinent labs within the past 24 hours have been reviewed.    Significant Imaging: I have reviewed all pertinent imaging results/findings within the past 24 hours.

## 2024-05-25 NOTE — ASSESSMENT & PLAN NOTE
Patient presented to the [facility] ED with complaints of chest pain.  - 1st troponin was 1535.9, second reading pending  - EKG showed T wave inversions  in anterolateral leads  - ECHO pending  - patient on cardiac monitoring  - cardiology consulted  - patient is on ACS protocol  - EDITH score is 4  - Heart Score: 8  - risk of MACE of 50-65%    Plan for left heart catheterization Tuesday

## 2024-05-25 NOTE — CONSULTS
[facility] - [facility] Medical Telemetry  Cardiology  Consult Note    Patient Name: Richard Soliz  MRN: [id number]  Admission Date: 5/25/2024  Hospital Length of Stay: 0 days  Code Status: Full Code   Attending Provider: Amandeep Comer DO   Consulting Provider: Socrates Porter DO  Primary Care Physician: Valentina Mcintyre FNP  Principal Problem:NSTEMI (non-ST elevated myocardial infarction)    Patient information was obtained from patient, spouse/SO, and ER records.     Consults  Subjective:     Chief Complaint:  chest pain     HPI:   CC: chest pain    HPI: Pt reports chest pain, starting approximately three days ago, mid sternal, initially a sensation of tightness, progressing to a squeezing sensation, 7/10 at most severe, radiates into right arm and right side of his neck, lasting up to thirty minutes.  He reports decreasing exercise tolerance due to fatigue over last several weeks, wife at bedside reports fatigue and shortness of breath have been present x several months.     Past Medical History:   Diagnosis Date    Acquired asymmetry of prostate     Acute prostatitis 10/29/2021    Allergic rhinitis     Asthma     BPH with obstruction/lower urinary tract symptoms 10/29/2021    [facility]     Community acquired pneumonia of left lower lobe of lung 2018    COPD (chronic obstructive pulmonary disease)     Essential hypertension     GERD (gastroesophageal reflux disease)     Hyperlipidemia     Personal history of colonic polyps 01/16/2017    repeat colonoscopy in 3 years    Type 2 diabetes mellitus     Vitamin D deficiency        Past Surgical History:   Procedure Laterality Date    COLONOSCOPY W/ BIOPSIES  01/16/2017    repeat in 3 years    nose surgery for broken nose as a child      right carpal tunnel surgery  03/2021    TONSILLECTOMY AND ADENOIDECTOMY         Review of patient's allergies indicates:  No Known Allergies    Current Facility-Administered Medications on File Prior to Encounter    Medication    BUPivacaine (PF) 0.25% (2.5 mg/ml) injection 1 mL    BUPivacaine (PF) 0.25% (2.5 mg/ml) injection 1 mL    BUPivacaine (PF) 0.25% (2.5 mg/ml) injection 1 mL    triamcinolone acetonide injection 40 mg    triamcinolone acetonide injection 40 mg    triamcinolone acetonide injection 40 mg     Current Outpatient Medications on File Prior to Encounter   Medication Sig    albuterol (PROVENTIL HFA) 90 mcg/actuation inhaler Inhale 2 puffs into the lungs every 6 (six) hours as needed for Wheezing. Rescue    C,E,zinc,copper 11-omega3s-lut (OCUVITE ADULT 50 PLUS) 250-5-1 mg Cap Take 1 tablet by mouth once daily.    cetirizine (ZYRTEC) 10 MG tablet TAKE ONE (1) TABLET  BY MOUTH ONCE DAILY.    cholecalciferol, vitamin D3, 125 mcg (5,000 unit) Tab Take 5,000 Units by mouth once daily.    cinnamon bark-chromium picolin 500-100 mg-mcg Cap Take 1 capsule by mouth once daily.    citalopram (CELEXA) 40 MG tablet Take 1 tablet (40 mg total) by mouth once daily.    empagliflozin (JARDIANCE) 25 mg tablet Take 1 tablet (25 mg total) by mouth once daily.    finasteride (PROSCAR) 5 mg tablet Take 1 tablet (5 mg total) by mouth once daily.    glucosamine-chondroitin 500-400 mg tablet Take 1 tablet by mouth 3 (three) times daily.    meloxicam (MOBIC) 7.5 MG tablet Take 1 tablet (7.5 mg total) by mouth once daily.    metFORMIN (GLUCOPHAGE) 1000 MG tablet Take 1 tablet (1,000 mg total) by mouth 2 (two) times a day.    montelukast (SINGULAIR) 10 mg tablet Take 1 tablet (10 mg total) by mouth once daily.    omeprazole (PRILOSEC) 20 MG capsule Take 20 mg by mouth once daily.    potassium citrate 99 mg Cap Take by mouth.    rosuvastatin (CRESTOR) 10 MG tablet Take 1 tablet (10 mg total) by mouth every evening.     Family History       Problem Relation (Age of Onset)    Cancer Father    Diabetes Mother, Father    Heart failure Father    Hyperlipidemia Mother    Hypertension Mother, Father, Brother    Multiple sclerosis Sister     Nephrolithiasis Brother          Tobacco Use    Smoking status: Former    Smokeless tobacco: Never    Tobacco comments:     quit smoking 26 years ago   Substance and Sexual Activity    Alcohol use: Not Currently    Drug use: Never    Sexual activity: Not Currently     Review of Systems   Constitutional: Positive for malaise/fatigue. Negative for diaphoresis, night sweats and weight gain.   HENT:  Negative for congestion, ear pain, hearing loss, nosebleeds and sore throat.    Eyes:  Negative for blurred vision, double vision, pain, photophobia and visual disturbance.   Cardiovascular:  Positive for chest pain and dyspnea on exertion. Negative for claudication, irregular heartbeat, leg swelling, near-syncope, orthopnea, palpitations and syncope.   Respiratory:  Positive for shortness of breath and sleep disturbances due to breathing. Negative for cough, snoring and wheezing.         Sleeps 20 hours a day, cannot stay awake when he sits down, loud snoring with apneic episodes.    Endocrine: Negative for cold intolerance, heat intolerance, polydipsia, polyphagia and polyuria.   Hematologic/Lymphatic: Negative for bleeding problem. Does not bruise/bleed easily.   Skin:  Negative for dry skin, flushing, itching, rash and skin cancer.   Musculoskeletal:  Positive for joint pain. Negative for arthritis, back pain, falls, muscle cramps, muscle weakness and myalgias.   Gastrointestinal:  Positive for heartburn. Negative for abdominal pain, change in bowel habit, constipation, diarrhea, dysphagia, nausea and vomiting.   Genitourinary:  Positive for frequency and nocturia. Negative for bladder incontinence, dysuria and flank pain.        HX prostatitis, BVH   Neurological:  Negative for dizziness, focal weakness, headaches, light-headedness, loss of balance, numbness, paresthesias and seizures.   Psychiatric/Behavioral:  Positive for depression. Negative for memory loss and substance abuse. The patient is nervous/anxious.          Under physch care   Allergic/Immunologic: Negative for environmental allergies.   Objective:     Vital Signs (Most Recent):  Temp: 98.1 °F (36.7 °C) (05/25/24 0903)  Pulse: 70 (05/25/24 0903)  Resp: 16 (05/25/24 0903)  BP: (!) 144/82 (05/25/24 0903)  SpO2: 95 % (05/25/24 0903) Vital Signs (24h Range):  Temp:  [97.7 °F (36.5 °C)-98.1 °F (36.7 °C)] 98.1 °F (36.7 °C)  Pulse:  [66-95] 70  Resp:  [8-16] 16  SpO2:  [90 %-96 %] 95 %  BP: (118-172)/() 144/82     Weight: 87.1 kg (192 lb 0.3 oz)  Body mass index is 30.99 kg/m².    SpO2: 95 %         Intake/Output Summary (Last 24 hours) at 5/25/2024 1011  Last data filed at 5/25/2024 0655  Gross per 24 hour   Intake 118.65 ml   Output --   Net 118.65 ml       Lines/Drains/Airways       Peripheral Intravenous Line  Duration                  Peripheral IV - Single Lumen 05/25/24 0310 20 G Anterior;Distal;Left Upper Arm <1 day         Peripheral IV - Single Lumen 05/25/24 0515 20 G Posterior;Right Hand <1 day                     Physical Exam  Vitals and nursing note reviewed.   Constitutional:       Appearance: Normal appearance. He is obese.   HENT:      Head: Normocephalic and atraumatic.      Right Ear: External ear normal.      Left Ear: External ear normal.   Eyes:      General: No scleral icterus.        Right eye: No discharge.         Left eye: No discharge.      Extraocular Movements: Extraocular movements intact.      Conjunctiva/sclera: Conjunctivae normal.      Pupils: Pupils are equal, round, and reactive to light.   Cardiovascular:      Rate and Rhythm: Normal rate and regular rhythm.      Pulses: Normal pulses.      Heart sounds: Normal heart sounds. No murmur heard.     No friction rub. No gallop.   Pulmonary:      Effort: Pulmonary effort is normal.      Breath sounds: Normal breath sounds. No wheezing, rhonchi or rales.   Chest:      Chest wall: No tenderness.   Abdominal:      General: Abdomen is flat. Bowel sounds are normal. There is no distension.  "     Palpations: Abdomen is soft.      Tenderness: There is no abdominal tenderness. There is no guarding or rebound.   Musculoskeletal:         General: No swelling or tenderness. Normal range of motion.      Cervical back: Normal range of motion and neck supple.      Right lower leg: Edema present.      Left lower leg: Edema present.   Skin:     General: Skin is warm and dry.      Findings: No erythema or rash.   Neurological:      General: No focal deficit present.      Mental Status: He is alert and oriented to person, place, and time.      Cranial Nerves: No cranial nerve deficit.      Motor: No weakness.      Gait: Gait normal.   Psychiatric:         Mood and Affect: Mood normal.         Behavior: Behavior normal.         Thought Content: Thought content normal.         Judgment: Judgment normal.      Significant Labs: CMP   Recent Labs   Lab 05/25/24  0310 05/25/24  0540    139   K 4.7 4.6    104   CO2 27 29   * 229*   BUN 15 15   CREATININE 1.35* 1.17   CALCIUM 9.1 9.0   PROT 6.9  --    ALBUMIN 3.8  --    BILITOT 0.5  --    ALKPHOS 132*  --    AST 24  --    ALT 32  --    ANIONGAP 13 11   , CBC   Recent Labs   Lab 05/25/24  0310 05/25/24  0541   WBC 10.90 10.21   HGB 15.8 15.0   HCT 49.2 45.3    212   , and Troponin No results for input(s): "TROPONINI" in the last 48 hours.    Significant Imaging: Echocardiogram: Transthoracic echo (TTE) complete (Cupid Only):   Results for orders placed or performed during the hospital encounter of 05/25/24   Echo   Result Value Ref Range    BSA 2.01 m2    LVOT stroke volume 50.36 cm3    LVIDd 5.00 3.5 - 6.0 cm    LV Systolic Volume 92.56 mL    LV Systolic Volume Index 47.0 mL/m2    LVIDs 4.50 (A) 2.1 - 4.0 cm    LV Diastolic Volume 118.51 mL    LV Diastolic Volume Index 60.16 mL/m2    IVS 1.05 0.6 - 1.1 cm    LVOT diameter 1.99 cm    LVOT area 3.1 cm2    FS 10 (A) 28 - 44 %    Left Ventricle Relative Wall Thickness 0.49 cm    Posterior Wall 1.23 " (A) 0.6 - 1.1 cm    LV mass 217.61 g    LV Mass Index 110 g/m2    MV Peak E Bassam 0.62 m/s    TDI LATERAL 0.07 m/s    TDI SEPTAL 0.06 m/s    E/E' ratio 9.54 m/s    MV Peak A Bassam 0.95 m/s    E/A ratio 0.65     E wave deceleration time 206.72 msec    LV SEPTAL E/E' RATIO 10.33 m/s    LV LATERAL E/E' RATIO 8.86 m/s    LVOT peak bassam 0.71 m/s    Left Ventricular Outflow Tract Mean Velocity 0.50 cm/s    Left Ventricular Outflow Tract Mean Gradient 1.14 mmHg    RVDD 3.18 cm    TAPSE 1.69 cm    RV/LV Ratio 0.64 cm    LA size 3.05 cm    Left Atrium Major Axis 3.68 cm    RA Major Axis 3.84 cm    AV mean gradient 3 mmHg    AV peak gradient 4 mmHg    Ao peak bassam 1.00 m/s    Ao VTI 21.30 cm    LVOT peak VTI 16.20 cm    AV valve area 2.36 cm²    AV Velocity Ratio 0.71     AV index (prosthetic) 0.76     TEGAN by Velocity Ratio 2.21 cm²    PV PEAK VELOCITY 0.64 m/s    PV peak gradient 2 mmHg    Ao root annulus 2.77 cm    IVC diameter 1.64 cm    Mean e' 0.07 m/s    ZLVIDS 2.03     ZLVIDD -1.24     AORTIC VALVE CUSP SEPERATION 2.16 cm    EF 55 %    Est. RA pres 3 mmHg    Narrative      Left Ventricle: The left ventricle is normal in size. Mildly increased   wall thickness. There is concentric hypertrophy. Mild global hypokinesis   present. There is mildly reduced systolic function with a visually   estimated ejection fraction of 45 - 50%. Ejection fraction by visual   approximation is 55%. There is normal diastolic function.    Right Ventricle: Normal right ventricular cavity size. Systolic   function is normal.    Aortic Valve: The aortic valve is structurally normal. Mildly calcified   cusps.    Mitral Valve: There is mild bileaflet sclerosis. Mildly thickened   leaflets. There is mild regurgitation with an eccentric jet.    IVC/SVC: Normal venous pressure at 3 mmHg.    Pericardium: There is a trivial effusion. No indication of cardiac   tamponade.       Assessment and Plan:     * NSTEMI (non-ST elevated myocardial infarction)  NSTEMI,  "chest pain has resolved with medical tx, recommend [facility] in [place]    Anxiety  Controlled on current medications.     Screening for malignant neoplasm of colon  Following with psychiatry, well controlled.       Diabetes mellitus without complication  Patient's FSGs are uncontrolled due to hyperglycemia on current medication regimen.  Last A1c reviewed-   Lab Results   Component Value Date    HGBA1C 8.1 (H) 05/25/2024     Most recent fingerstick glucose reviewed- No results for input(s): "POCTGLUCOSE" in the last 24 hours.  Current correctional scale    Maintain anti-hyperglycemic dose as follows-   Antihyperglycemics (From admission, onward)      Start     Stop Route Frequency Ordered    05/25/24 0631  insulin aspart U-100 injection 0-5 Units         -- SubQ Every 6 hours PRN 05/25/24 0532          Hold Oral hypoglycemics while patient is in the hospital.        VTE Risk Mitigation (From admission, onward)           Ordered     heparin 25,000 units in dextrose 5% 250 mL (100 units/mL) infusion LOW INTENSITY nomogram - [facility]  Continuous        Question:  Begin at (units/kg/hr)  Answer:  12    05/25/24 0531     heparin 25,000 units in dextrose 5% (100 units/ml) IV bolus from bag LOW INTENSITY nomogram - [facility]  As needed (PRN)        Question:  Heparin Infusion Adjustment (DO NOT MODIFY ANSWER)  Answer:  [email or web address]\[email or web address]\Images\Pharmacy\HeparinInfusions\heparin LOW INTENSITY nomogram for [facility] [id number]    05/25/24 0531     heparin 25,000 units in dextrose 5% (100 units/ml) IV bolus from bag LOW INTENSITY nomogram - [facility]  As needed (PRN)        Question:  Heparin Infusion Adjustment (DO NOT MODIFY ANSWER)  Answer:  \[email or web address]\[email or web address]\Images\Pharmacy\HeparinInfusions\heparin LOW INTENSITY nomogram for [facility] [id number]    05/25/24 0531     Reason for No Pharmacological VTE Prophylaxis  Once        Comments: Patient will be on heparin infusion for ACS   Question:  Reasons:  Answer:  Physician Provided (leave comment)    05/25/24 " 0531     IP VTE HIGH RISK PATIENT  Once         05/25/24 0531     Place sequential compression device  Until discontinued         05/25/24 0531                    Thank you for your consult. I will follow-up with patient. Please contact us if you have any additional questions.    Socrates Porter, DO  Cardiology   [facility] - [address] [place] [address]

## 2024-05-25 NOTE — ASSESSMENT & PLAN NOTE
"Patient's FSGs are uncontrolled due to hyperglycemia on current medication regimen.  Last A1c reviewed-   Lab Results   Component Value Date    HGBA1C 8.1 (H) 05/25/2024     Most recent fingerstick glucose reviewed- No results for input(s): "POCTGLUCOSE" in the last 24 hours.  Current correctional scale    Maintain anti-hyperglycemic dose as follows-   Antihyperglycemics (From admission, onward)      Start     Stop Route Frequency Ordered    05/25/24 0631  insulin aspart U-100 injection 0-5 Units         -- SubQ Every 6 hours PRN 05/25/24 0532          Hold Oral hypoglycemics while patient is in the hospital.  "

## 2024-05-25 NOTE — HOSPITAL COURSE
Pt admitted, started on optimal tx, chest pain reoccured while walking in hallway, lasted two to three minutes, resolved with sitting back down.

## 2024-05-25 NOTE — HPI
CC: chest pain    HPI: Pt reports chest pain, starting approximately three days ago, mid sternal, initially a sensation of tightness, progressing to a squeezing sensation, 7/10 at most severe, radiates into right arm and right side of his neck, lasting up to thirty minutes.  He reports decreasing exercise tolerance due to fatigue over last several weeks, wife at bedside reports fatigue and shortness of breath have been present x several months.

## 2024-05-25 NOTE — ASSESSMENT & PLAN NOTE
"Patient's FSGs are uncontrolled due to hyperglycemia on current medication regimen.  Last A1c reviewed-   Lab Results   Component Value Date    HGBA1C 8.3 (H) 01/22/2024     Most recent fingerstick glucose reviewed- No results for input(s): "POCTGLUCOSE" in the last 24 hours.  Current correctional scale  Low  Maintain anti-hyperglycemic dose as follows-   Antihyperglycemics (From admission, onward)      Start     Stop Route Frequency Ordered    05/25/24 0631  insulin aspart U-100 injection 0-5 Units         -- SubQ Every 6 hours PRN 05/25/24 0532          Hold Oral hypoglycemics while patient is in the hospital.  "

## 2024-05-25 NOTE — SUBJECTIVE & OBJECTIVE
Past Medical History:   Diagnosis Date    Acquired asymmetry of prostate     Acute prostatitis 10/29/2021    Allergic rhinitis     Asthma     BPH with obstruction/lower urinary tract symptoms 10/29/2021    flomax [facility]     Community acquired pneumonia of left lower lobe of lung 2018    COPD (chronic obstructive pulmonary disease)     Essential hypertension     GERD (gastroesophageal reflux disease)     Hyperlipidemia     Personal history of colonic polyps 01/16/2017    repeat colonoscopy in 3 years    Type 2 diabetes mellitus     Vitamin D deficiency        Past Surgical History:   Procedure Laterality Date    COLONOSCOPY W/ BIOPSIES  01/16/2017    repeat in 3 years    nose surgery for broken nose as a child      right carpal tunnel surgery  03/2021    TONSILLECTOMY AND ADENOIDECTOMY         Review of patient's allergies indicates:  No Known Allergies    Current Facility-Administered Medications on File Prior to Encounter   Medication    BUPivacaine (PF) 0.25% (2.5 mg/ml) injection 1 mL    BUPivacaine (PF) 0.25% (2.5 mg/ml) injection 1 mL    BUPivacaine (PF) 0.25% (2.5 mg/ml) injection 1 mL    triamcinolone acetonide injection 40 mg    triamcinolone acetonide injection 40 mg    triamcinolone acetonide injection 40 mg     Current Outpatient Medications on File Prior to Encounter   Medication Sig    albuterol (PROVENTIL HFA) 90 mcg/actuation inhaler Inhale 2 puffs into the lungs every 6 (six) hours as needed for Wheezing. Rescue    C,E,zinc,copper 11-omega3s-lut (OCUVITE ADULT 50 PLUS) 250-5-1 mg Cap Take 1 tablet by mouth once daily.    cetirizine (ZYRTEC) 10 MG tablet TAKE ONE (1) TABLET  BY MOUTH ONCE DAILY.    cholecalciferol, vitamin D3, 125 mcg (5,000 unit) Tab Take 5,000 Units by mouth once daily.    cinnamon bark-chromium picolin 500-100 mg-mcg Cap Take 1 capsule by mouth once daily.    citalopram (CELEXA) 40 MG tablet Take 1 tablet (40 mg total) by mouth once daily.    empagliflozin (JARDIANCE) 25 mg tablet  Take 1 tablet (25 mg total) by mouth once daily.    finasteride (PROSCAR) 5 mg tablet Take 1 tablet (5 mg total) by mouth once daily.    glucosamine-chondroitin 500-400 mg tablet Take 1 tablet by mouth 3 (three) times daily.    meloxicam (MOBIC) 7.5 MG tablet Take 1 tablet (7.5 mg total) by mouth once daily.    metFORMIN (GLUCOPHAGE) 1000 MG tablet Take 1 tablet (1,000 mg total) by mouth 2 (two) times a day.    montelukast (SINGULAIR) 10 mg tablet Take 1 tablet (10 mg total) by mouth once daily.    omeprazole (PRILOSEC) 20 MG capsule Take 20 mg by mouth once daily.    potassium citrate 99 mg Cap Take by mouth.    rosuvastatin (CRESTOR) 10 MG tablet Take 1 tablet (10 mg total) by mouth every evening.     Family History       Problem Relation (Age of Onset)    Cancer Father    Diabetes Mother, Father    Heart failure Father    Hyperlipidemia Mother    Hypertension Mother, Father, Brother    Multiple sclerosis Sister    Nephrolithiasis Brother          Tobacco Use    Smoking status: Former    Smokeless tobacco: Never    Tobacco comments:     quit smoking 26 years ago   Substance and Sexual Activity    Alcohol use: Not Currently    Drug use: Never    Sexual activity: Not Currently     Review of Systems   Constitutional: Positive for malaise/fatigue. Negative for diaphoresis, night sweats and weight gain.   HENT:  Negative for congestion, ear pain, hearing loss, nosebleeds and sore throat.    Eyes:  Negative for blurred vision, double vision, pain, photophobia and visual disturbance.   Cardiovascular:  Positive for chest pain and dyspnea on exertion. Negative for claudication, irregular heartbeat, leg swelling, near-syncope, orthopnea, palpitations and syncope.   Respiratory:  Positive for shortness of breath and sleep disturbances due to breathing. Negative for cough, snoring and wheezing.         Sleeps 20 hours a day, cannot stay awake when he sits down, loud snoring with apneic episodes.    Endocrine: Negative for  cold intolerance, heat intolerance, polydipsia, polyphagia and polyuria.   Hematologic/Lymphatic: Negative for bleeding problem. Does not bruise/bleed easily.   Skin:  Negative for dry skin, flushing, itching, rash and skin cancer.   Musculoskeletal:  Positive for joint pain. Negative for arthritis, back pain, falls, muscle cramps, muscle weakness and myalgias.   Gastrointestinal:  Positive for heartburn. Negative for abdominal pain, change in bowel habit, constipation, diarrhea, dysphagia, nausea and vomiting.   Genitourinary:  Positive for frequency and nocturia. Negative for bladder incontinence, dysuria and flank pain.        HX prostatitis, BVH   Neurological:  Negative for dizziness, focal weakness, headaches, light-headedness, loss of balance, numbness, paresthesias and seizures.   Psychiatric/Behavioral:  Positive for depression. Negative for memory loss and substance abuse. The patient is nervous/anxious.         Under physch care   Allergic/Immunologic: Negative for environmental allergies.   Objective:     Vital Signs (Most Recent):  Temp: 98.1 °F (36.7 °C) (05/25/24 0903)  Pulse: 70 (05/25/24 0903)  Resp: 16 (05/25/24 0903)  BP: (!) 144/82 (05/25/24 0903)  SpO2: 95 % (05/25/24 0903) Vital Signs (24h Range):  Temp:  [97.7 °F (36.5 °C)-98.1 °F (36.7 °C)] 98.1 °F (36.7 °C)  Pulse:  [66-95] 70  Resp:  [8-16] 16  SpO2:  [90 %-96 %] 95 %  BP: (118-172)/() 144/82     Weight: 87.1 kg (192 lb 0.3 oz)  Body mass index is 30.99 kg/m².    SpO2: 95 %         Intake/Output Summary (Last 24 hours) at 5/25/2024 1011  Last data filed at 5/25/2024 0655  Gross per 24 hour   Intake 118.65 ml   Output --   Net 118.65 ml       Lines/Drains/Airways       Peripheral Intravenous Line  Duration                  Peripheral IV - Single Lumen 05/25/24 0310 20 G Anterior;Distal;Left Upper Arm <1 day         Peripheral IV - Single Lumen 05/25/24 0515 20 G Posterior;Right Hand <1 day                     Physical Exam  Vitals and  nursing note reviewed.   Constitutional:       Appearance: Normal appearance. He is obese.   HENT:      Head: Normocephalic and atraumatic.      Right Ear: External ear normal.      Left Ear: External ear normal.   Eyes:      General: No scleral icterus.        Right eye: No discharge.         Left eye: No discharge.      Extraocular Movements: Extraocular movements intact.      Conjunctiva/sclera: Conjunctivae normal.      Pupils: Pupils are equal, round, and reactive to light.   Cardiovascular:      Rate and Rhythm: Normal rate and regular rhythm.      Pulses: Normal pulses.      Heart sounds: Normal heart sounds. No murmur heard.     No friction rub. No gallop.   Pulmonary:      Effort: Pulmonary effort is normal.      Breath sounds: Normal breath sounds. No wheezing, rhonchi or rales.   Chest:      Chest wall: No tenderness.   Abdominal:      General: Abdomen is flat. Bowel sounds are normal. There is no distension.      Palpations: Abdomen is soft.      Tenderness: There is no abdominal tenderness. There is no guarding or rebound.   Musculoskeletal:         General: No swelling or tenderness. Normal range of motion.      Cervical back: Normal range of motion and neck supple.      Right lower leg: Edema present.      Left lower leg: Edema present.   Skin:     General: Skin is warm and dry.      Findings: No erythema or rash.   Neurological:      General: No focal deficit present.      Mental Status: He is alert and oriented to person, place, and time.      Cranial Nerves: No cranial nerve deficit.      Motor: No weakness.      Gait: Gait normal.   Psychiatric:         Mood and Affect: Mood normal.         Behavior: Behavior normal.         Thought Content: Thought content normal.         Judgment: Judgment normal.      Significant Labs: CMP   Recent Labs   Lab 05/25/24  0310 05/25/24  0540    139   K 4.7 4.6    104   CO2 27 29   * 229*   BUN 15 15   CREATININE 1.35* 1.17   CALCIUM 9.1 9.0  "  PROT 6.9  --    ALBUMIN 3.8  --    BILITOT 0.5  --    ALKPHOS 132*  --    AST 24  --    ALT 32  --    ANIONGAP 13 11   , CBC   Recent Labs   Lab 05/25/24  0310 05/25/24  0541   WBC 10.90 10.21   HGB 15.8 15.0   HCT 49.2 45.3    212   , and Troponin No results for input(s): "TROPONINI" in the last 48 hours.    Significant Imaging: Echocardiogram: Transthoracic echo (TTE) complete (Cupid Only):   Results for orders placed or performed during the hospital encounter of 05/25/24   Echo   Result Value Ref Range    BSA 2.01 m2    LVOT stroke volume 50.36 cm3    LVIDd 5.00 3.5 - 6.0 cm    LV Systolic Volume 92.56 mL    LV Systolic Volume Index 47.0 mL/m2    LVIDs 4.50 (A) 2.1 - 4.0 cm    LV Diastolic Volume 118.51 mL    LV Diastolic Volume Index 60.16 mL/m2    IVS 1.05 0.6 - 1.1 cm    LVOT diameter 1.99 cm    LVOT area 3.1 cm2    FS 10 (A) 28 - 44 %    Left Ventricle Relative Wall Thickness 0.49 cm    Posterior Wall 1.23 (A) 0.6 - 1.1 cm    LV mass 217.61 g    LV Mass Index 110 g/m2    MV Peak E Bassam 0.62 m/s    TDI LATERAL 0.07 m/s    TDI SEPTAL 0.06 m/s    E/E' ratio 9.54 m/s    MV Peak A Bassam 0.95 m/s    E/A ratio 0.65     E wave deceleration time 206.72 msec    LV SEPTAL E/E' RATIO 10.33 m/s    LV LATERAL E/E' RATIO 8.86 m/s    LVOT peak bassam 0.71 m/s    Left Ventricular Outflow Tract Mean Velocity 0.50 cm/s    Left Ventricular Outflow Tract Mean Gradient 1.14 mmHg    RVDD 3.18 cm    TAPSE 1.69 cm    RV/LV Ratio 0.64 cm    LA size 3.05 cm    Left Atrium Major Axis 3.68 cm    RA Major Axis 3.84 cm    AV mean gradient 3 mmHg    AV peak gradient 4 mmHg    Ao peak bassam 1.00 m/s    Ao VTI 21.30 cm    LVOT peak VTI 16.20 cm    AV valve area 2.36 cm²    AV Velocity Ratio 0.71     AV index (prosthetic) 0.76     TEGAN by Velocity Ratio 2.21 cm²    PV PEAK VELOCITY 0.64 m/s    PV peak gradient 2 mmHg    Ao root annulus 2.77 cm    IVC diameter 1.64 cm    Mean e' 0.07 m/s    ZLVIDS 2.03     ZLVIDD -1.24     AORTIC VALVE CUSP " SEPERATION 2.16 cm    EF 55 %    Est. RA pres 3 mmHg    Narrative      Left Ventricle: The left ventricle is normal in size. Mildly increased   wall thickness. There is concentric hypertrophy. Mild global hypokinesis   present. There is mildly reduced systolic function with a visually   estimated ejection fraction of 45 - 50%. Ejection fraction by visual   approximation is 55%. There is normal diastolic function.    Right Ventricle: Normal right ventricular cavity size. Systolic   function is normal.    Aortic Valve: The aortic valve is structurally normal. Mildly calcified   cusps.    Mitral Valve: There is mild bileaflet sclerosis. Mildly thickened   leaflets. There is mild regurgitation with an eccentric jet.    IVC/SVC: Normal venous pressure at 3 mmHg.    Pericardium: There is a trivial effusion. No indication of cardiac   tamponade.

## 2024-05-25 NOTE — ED PROVIDER NOTES
Encounter Date: 5/25/2024       History     Chief Complaint   Patient presents with    Chest Pain     Chest pain starting 2 days ago. Took otc mylanta w/ improvement of symptoms.       Chief complaint: Chest pain   HPI: A 68-year-old male patient came to the emergency department complaining of chest pain.  The patient states that his pain started 2 days ago.  He states that he took Mylanta and drank called coke with improvement of the symptoms.  Tonight the symptoms returned which bothered him a lot and came to the emergency department for it.  The patient denies shortness of breath.  The patient denies diaphoresis.  The patient denies nausea or vomiting.  There is no fever or chills.  There is no cough.    The history is provided by the patient. No  was used.     Review of patient's allergies indicates:  No Known Allergies  Past Medical History:   Diagnosis Date    Acquired asymmetry of prostate     Acute prostatitis 10/29/2021    Allergic rhinitis     Asthma     BPH with obstruction/lower urinary tract symptoms 10/29/2021    flomax [facility]     Community acquired pneumonia of left lower lobe of lung 2018    COPD (chronic obstructive pulmonary disease)     Essential hypertension     GERD (gastroesophageal reflux disease)     Hyperlipidemia     Personal history of colonic polyps 01/16/2017    repeat colonoscopy in 3 years    Type 2 diabetes mellitus     Vitamin D deficiency      Past Surgical History:   Procedure Laterality Date    COLONOSCOPY W/ BIOPSIES  01/16/2017    repeat in 3 years    nose surgery for broken nose as a child      right carpal tunnel surgery  03/2021    TONSILLECTOMY AND ADENOIDECTOMY       Family History   Problem Relation Name Age of Onset    Diabetes Mother      Hyperlipidemia Mother      Hypertension Mother      Diabetes Father      Heart failure Father      Hypertension Father      Cancer Father      Multiple sclerosis Sister      Hypertension Brother      Nephrolithiasis  Brother       Social History     Tobacco Use    Smoking status: Former    Smokeless tobacco: Never    Tobacco comments:     quit smoking 26 years ago   Substance Use Topics    Alcohol use: Not Currently    Drug use: Never     Review of Systems   Constitutional:  Negative for fever.   HENT:  Negative for sore throat.    Respiratory:  Negative for shortness of breath.    Cardiovascular:  Positive for chest pain.   Gastrointestinal:  Negative for nausea.   Genitourinary:  Negative for dysuria.   Musculoskeletal:  Negative for back pain.   Skin:  Negative for rash.   Neurological:  Negative for weakness.   Hematological:  Does not bruise/bleed easily.       Physical Exam     Initial Vitals   BP Pulse Resp Temp SpO2   05/25/24 0319 05/25/24 0311 05/25/24 0311 05/25/24 0319 05/25/24 0311   (!) 172/101 95 16 97.7 °F (36.5 °C) 96 %      MAP       --                Physical Exam    Nursing note and vitals reviewed.  Constitutional: He appears well-developed and well-nourished.   HENT:   Head: Normocephalic and atraumatic.   Eyes: EOM are normal. Pupils are equal, round, and reactive to light.   Neck: Neck supple. No thyromegaly present.   Normal range of motion.  Cardiovascular:  Normal rate, regular rhythm, normal heart sounds and intact distal pulses.           No murmur heard.  Pulmonary/Chest: Breath sounds normal. No respiratory distress. He has no wheezes.   Abdominal: Abdomen is soft. Bowel sounds are normal. There is no abdominal tenderness.   Musculoskeletal:         General: No tenderness or edema. Normal range of motion.      Cervical back: Normal range of motion and neck supple.     Lymphadenopathy:     He has no cervical adenopathy.   Neurological: He is alert and oriented to person, place, and time. He has normal strength and normal reflexes. No cranial nerve deficit or sensory deficit. GCS score is 15. GCS eye subscore is 4. GCS verbal subscore is 5. GCS motor subscore is 6.   Skin: Skin is warm and dry.  Capillary refill takes less than 2 seconds. No rash noted.   Psychiatric: He has a normal mood and affect.         Medical Screening Exam   See Full Note    ED Course   Procedures  Labs Reviewed   COMPREHENSIVE METABOLIC PANEL - Abnormal; Notable for the following components:       Result Value    Glucose 286 (*)     Creatinine 1.35 (*)     Alk Phos 132 (*)     eGFR 58 (*)     All other components within normal limits   TROPONIN I - Abnormal; Notable for the following components:    Troponin I High Sensitivity 1,535.9 (*)     All other components within normal limits   CBC WITH DIFFERENTIAL - Abnormal; Notable for the following components:    MPV 8.6 (*)     Neutrophils % 52.2 (*)     Monocytes % 9.5 (*)     Basophils % 1.3 (*)     Immature Granulocytes % 0.5 (*)     Monocytes, Absolute 1.04 (*)     Immature Granulocytes, Absolute 0.05 (*)     All other components within normal limits   TROPONIN I - Abnormal; Notable for the following components:    Troponin I High Sensitivity 1,165.4 (*)     All other components within normal limits   BASIC METABOLIC PANEL - Abnormal; Notable for the following components:    Glucose 229 (*)     All other components within normal limits   TROPONIN I - Abnormal; Notable for the following components:    Troponin I High Sensitivity 1,261.3 (*)     All other components within normal limits   CBC WITH DIFFERENTIAL - Abnormal; Notable for the following components:    MPV 9.0 (*)     Lymphocytes % 22.6 (*)     Monocytes % 8.5 (*)     Monocytes, Absolute 0.87 (*)     All other components within normal limits   HEMOGLOBIN A1C - Abnormal; Notable for the following components:    Hemoglobin A1C 8.1 (*)     All other components within normal limits   POCT GLUCOSE MONITORING CONTINUOUS - Abnormal; Notable for the following components:    POC Glucose 225 (*)     All other components within normal limits   PROTIME-INR - Normal   SARS-COV-2 RNA AMPLIFICATION, QUAL - Normal    Narrative:      Negative SARS-CoV results should not be used as the sole basis for treatment or patient management decisions; negative results should be considered in the context of a patient's recent exposures, history and the presene of clinical signs and symptoms consistent with COVID-19.  Negative results should be treated as presumptive and confirmed by molecular assay, if necessary for patient management.   APTT - Normal   PROTIME-INR - Normal   CBC W/ AUTO DIFFERENTIAL    Narrative:     The following orders were created for panel order CBC auto differential.  Procedure                               Abnormality         Status                     ---------                               -----------         ------                     CBC with Differential[[id number]]       Abnormal            Final result                 Please view results for these tests on the individual orders.   LIPID PANEL   CBC W/ AUTO DIFFERENTIAL    Narrative:     The following orders were created for panel order CBC auto differential.  Procedure                               Abnormality         Status                     ---------                               -----------         ------                     CBC with Differential[[id number]]       Abnormal            Final result                 Please view results for these tests on the individual orders.   TYPE & SCREEN   ABORH RETYPE   POCT GLUCOSE MONITORING CONTINUOUS        ECG Results              EKG 12-lead (Final result)        Collection Time Result Time QRS Duration [facility] QTC Calculation    05/25/24 05:11:38 05/25/24 09:30:23 92 440                     Final result by Interface, Lab In [facility] (05/25/24 09:30:33)                   Narrative:    Test Reason : R07.9,    Vent. Rate : 068 BPM     Atrial Rate : 000 BPM     P-R Int : 168 ms          QRS Dur : 092 ms      QT Int : 426 ms       P-R-T Axes : 055 -37 129 degrees     QTc Int : 440 ms    Sinus rhythm  Left axis deviation  Ant/septal and  lateral ST-T abnormality may be due to myocardial ischemia  Abnormal ECG    Confirmed by Socrates Porter DO ([id number]) on 5/25/2024 9:30:22 AM    Referred By: [email or web address]   SELF           Confirmed By:Socrates Porter DO                                     EKG 12-lead (Final result)        Collection Time Result Time QRS Duration OHS QTC Calculation    05/25/24 03:12:24 05/25/24 09:48:26 104 444                     Final result by Interface, Lab In [facility] (05/25/24 09:48:34)                   Narrative:    Test Reason : R07.9,    Vent. Rate : 084 BPM     Atrial Rate : 000 BPM     P-R Int : 166 ms          QRS Dur : 104 ms      QT Int : 402 ms       P-R-T Axes : 063 049 099 degrees     QTc Int : 444 ms    Sinus rhythm  Ant/septal and lateral ST-T abnormality is nonspecific  Borderline ECG    Confirmed by Socrates Porter DO ([id number]) on 5/25/2024 9:48:25 AM    Referred By: [email or web address]   SELF           Confirmed By:Socrates Porter DO                                  Imaging Results              X-Ray Chest AP Portable (Final result)  Result time 05/25/24 09:27:11      Final result by Nehemiah Ford MD (05/25/24 09:27:11)                   Impression:      No acute findings.      Electronically signed by: Nehemiah Ford  Date:    05/25/2024  Time:    09:27               Narrative:    EXAMINATION:  XR CHEST AP PORTABLE    CLINICAL HISTORY:  Chest pain, unspecified    TECHNIQUE:  Single frontal view of the chest was performed.    COMPARISON:  3/2/2020    FINDINGS:  Heart size normal. Lungs clear. No pneumothorax or pleural effusion.                                       Medications   cetirizine tablet 10 mg (10 mg Oral Given 5/25/24 1005)   citalopram tablet 40 mg (40 mg Oral Given 5/25/24 1005)   finasteride tablet 5 mg (5 mg Oral Given 5/25/24 1005)   montelukast tablet 10 mg (10 mg Oral Given 5/25/24 1005)   pantoprazole EC tablet 40 mg (40 mg Oral Given 5/25/24 1005)   atorvastatin tablet 80 mg (80 mg Oral Given 5/25/24 1005)    albuterol-ipratropium 2.5 mg-0.5 mg/3 mL nebulizer solution 3 mL (has no administration in time range)   sodium chloride 0.9% flush 10 mL (has no administration in time range)   lactated ringers infusion ( Intravenous Verify Only 5/25/24 2250)   acetaminophen tablet 650 mg (has no administration in time range)   HYDROcodone-acetaminophen 5-325 mg per tablet 1 tablet (has no administration in time range)   morphine injection 4 mg (has no administration in time range)   ondansetron injection 4 mg (has no administration in time range)   prochlorperazine injection Soln 5 mg (has no administration in time range)   metoprolol tartrate (LOPRESSOR) split tablet 12.5 mg (12.5 mg Oral Given 5/25/24 2119)   lisinopriL tablet 5 mg (5 mg Oral Given 5/25/24 1005)   nitroGLYCERIN SL tablet 0.4 mg (0.4 mg Sublingual Given 5/25/24 0541)   aspirin EC tablet 81 mg (81 mg Oral Given 5/25/24 1005)   heparin 25,000 units in dextrose 5% 250 mL (100 units/mL) infusion LOW INTENSITY nomogram - RUSH (14 Units/kg/hr × 73.1 kg (Adjusted) Intravenous Verify Only 5/25/24 2250)   heparin 25,000 units in dextrose 5% (100 units/ml) IV bolus from bag LOW INTENSITY nomogram - RUSH (has no administration in time range)   heparin 25,000 units in dextrose 5% (100 units/ml) IV bolus from bag LOW INTENSITY nomogram - RUSH (2,190 Units Intravenous Bolus from Bag 5/25/24 2132)   glucagon (human recombinant) injection 1 mg (has no administration in time range)   insulin aspart U-100 injection 0-5 Units (2 Units Subcutaneous Given 5/25/24 1757)   dextrose 10% bolus 125 mL 125 mL (has no administration in time range)   dextrose 10% bolus 250 mL 250 mL (has no administration in time range)   ticagrelor tablet 90 mg (90 mg Oral Given 5/25/24 2119)   aspirin tablet 325 mg (325 mg Oral Given 5/25/24 0324)   ticagrelor tablet 180 mg (180 mg Oral Given 5/25/24 0603)   heparin 25,000 units in dextrose 5% (100 units/ml) IV bolus from bag LOW INTENSITY nomogram -  RUSH (4,000 Units Intravenous Bolus from Bag 5/25/24 0469)     Medical Decision Making    MDM: A 68-year-old male patient came to the emergency department complaining of chest pain.  The patient states that he woke up with a pain.  There is associated shortness of breath.  There is no fever or chills.    Amount and/or Complexity of Data Reviewed  Labs: ordered. Decision-making details documented in ED Course.  Radiology: ordered and independent interpretation performed.  Discussion of management or test interpretation with external provider(s):   The workup of the patient revealed that he has elevated troponin which is consistent with NSTEMI.  I sent a message to Dr. Smallwood (hospitalist).  The patient will be admitted to the hospital    Risk  OTC drugs.  Decision regarding hospitalization.                                      Clinical Impression:   Final diagnoses:  [R07.9] Chest pain  [I21.4] NSTEMI (non-ST elevated myocardial infarction) (Primary)        ED Disposition Condition    Admit                 Jarvis Parker MD  05/25/24 2382

## 2024-05-25 NOTE — HPI
Patient is a 69 yo male with PMH of DM, BPH, anxiety, and depression. He presented to the [facility] with the complaint of chest pain that started about 2 days ago. He stated that it started off as chest tightness located in the mid of his chest. He graded it being 6-7/10. The pain was episodic with a single episode lasting about 30 minutes. The pain was non radiating. The pain was relieved by Mylanta. Patient denied accompanying SOB, palpitations, leg swelling, N/V, dizziness/lightheadedness, or syncope.  Tonight, he was woken up by the same symptoms but with increased intensity. This time pain radiated to his right arm and neck.  Patient reported orthopnea and excessive tiredness for past few months.  Patient doesn't see a cardiologist. He has family history of cardiac diseases.  Patient lives with wife. He is ambulatory and independent in ADLs.  ED course:  P        BP         Temp          pO2  75 141/75     afebrile   94 %  1st troponin: 1535.9  EKG: Abnormal EKG  CBC  10.90 15.8 222    49.2         BMP  139 104 15 286   4.7 27 1.35       Patient is being admitted to Hospital Medicine Service under care for further evaluation and management.

## 2024-05-26 LAB
APTT PPP: 90.1 SECONDS (ref 25.2–37.3)
APTT PPP: 91.8 SECONDS (ref 25.2–37.3)
BASOPHILS # BLD AUTO: 0.08 K/UL (ref 0–0.2)
BASOPHILS NFR BLD AUTO: 0.8 % (ref 0–1)
DIFFERENTIAL METHOD BLD: ABNORMAL
EOSINOPHIL # BLD AUTO: 0.33 K/UL (ref 0–0.5)
EOSINOPHIL NFR BLD AUTO: 3.2 % (ref 1–4)
ERYTHROCYTE [DISTWIDTH] IN BLOOD BY AUTOMATED COUNT: 12.5 % (ref 11.5–14.5)
GLUCOSE SERPL-MCNC: 179 MG/DL (ref 70–105)
GLUCOSE SERPL-MCNC: 229 MG/DL (ref 70–105)
GLUCOSE SERPL-MCNC: 278 MG/DL (ref 70–105)
HCT VFR BLD AUTO: 41.6 % (ref 40–54)
HGB BLD-MCNC: 13.8 G/DL (ref 13.5–18)
IMM GRANULOCYTES # BLD AUTO: 0.05 K/UL (ref 0–0.04)
IMM GRANULOCYTES NFR BLD: 0.5 % (ref 0–0.4)
LYMPHOCYTES # BLD AUTO: 2.59 K/UL (ref 1–4.8)
LYMPHOCYTES NFR BLD AUTO: 25.1 % (ref 27–41)
MCH RBC QN AUTO: 30.9 PG (ref 27–31)
MCHC RBC AUTO-ENTMCNC: 33.2 G/DL (ref 32–36)
MCV RBC AUTO: 93.3 FL (ref 80–96)
MONOCYTES # BLD AUTO: 0.96 K/UL (ref 0–0.8)
MONOCYTES NFR BLD AUTO: 9.3 % (ref 2–6)
MPC BLD CALC-MCNC: 9 FL (ref 9.4–12.4)
NEUTROPHILS # BLD AUTO: 6.32 K/UL (ref 1.8–7.7)
NEUTROPHILS NFR BLD AUTO: 61.1 % (ref 53–65)
NRBC # BLD AUTO: 0 X10E3/UL
NRBC, AUTO (.00): 0 %
PLATELET # BLD AUTO: 202 K/UL (ref 150–400)
RBC # BLD AUTO: 4.46 M/UL (ref 4.6–6.2)
WBC # BLD AUTO: 10.33 K/UL (ref 4.5–11)

## 2024-05-26 PROCEDURE — 99900035 HC TECH TIME PER 15 MIN (STAT)

## 2024-05-26 PROCEDURE — 27000221 HC OXYGEN, UP TO 24 HOURS

## 2024-05-26 PROCEDURE — 63600175 PHARM REV CODE 636 W HCPCS: Performed by: INTERNAL MEDICINE

## 2024-05-26 PROCEDURE — 99233 SBSQ HOSP IP/OBS HIGH 50: CPT | Mod: ,,, | Performed by: INTERNAL MEDICINE

## 2024-05-26 PROCEDURE — 99233 SBSQ HOSP IP/OBS HIGH 50: CPT | Mod: ,,, | Performed by: STUDENT IN AN ORGANIZED HEALTH CARE EDUCATION/TRAINING PROGRAM

## 2024-05-26 PROCEDURE — 85025 COMPLETE CBC W/AUTO DIFF WBC: CPT | Performed by: INTERNAL MEDICINE

## 2024-05-26 PROCEDURE — 94761 N-INVAS EAR/PLS OXIMETRY MLT: CPT

## 2024-05-26 PROCEDURE — 25000003 PHARM REV CODE 250: Performed by: INTERNAL MEDICINE

## 2024-05-26 PROCEDURE — 99900031 HC PATIENT EDUCATION (STAT)

## 2024-05-26 PROCEDURE — 63600175 PHARM REV CODE 636 W HCPCS: Performed by: STUDENT IN AN ORGANIZED HEALTH CARE EDUCATION/TRAINING PROGRAM

## 2024-05-26 PROCEDURE — 11000001 HC ACUTE MED/SURG PRIVATE ROOM

## 2024-05-26 PROCEDURE — 94660 CPAP INITIATION&MGMT: CPT

## 2024-05-26 PROCEDURE — 36415 COLL VENOUS BLD VENIPUNCTURE: CPT | Performed by: INTERNAL MEDICINE

## 2024-05-26 PROCEDURE — 85730 THROMBOPLASTIN TIME PARTIAL: CPT | Performed by: INTERNAL MEDICINE

## 2024-05-26 PROCEDURE — 82962 GLUCOSE BLOOD TEST: CPT

## 2024-05-26 PROCEDURE — 27000190 HC CPAP FULL FACE MASK W/VALVE

## 2024-05-26 RX ORDER — TALC
6 POWDER (GRAM) TOPICAL NIGHTLY PRN
Status: DISCONTINUED | OUTPATIENT
Start: 2024-05-27 | End: 2024-05-30 | Stop reason: HOSPADM

## 2024-05-26 RX ADMIN — HEPARIN SODIUM 14 UNITS/KG/HR: 10000 INJECTION, SOLUTION INTRAVENOUS at 02:05

## 2024-05-26 RX ADMIN — TICAGRELOR 90 MG: 90 TABLET ORAL at 08:05

## 2024-05-26 RX ADMIN — PANTOPRAZOLE SODIUM 40 MG: 40 TABLET, DELAYED RELEASE ORAL at 08:05

## 2024-05-26 RX ADMIN — SODIUM CHLORIDE, POTASSIUM CHLORIDE, SODIUM LACTATE AND CALCIUM CHLORIDE: 600; 310; 30; 20 INJECTION, SOLUTION INTRAVENOUS at 02:05

## 2024-05-26 RX ADMIN — ASPIRIN 81 MG: 81 TABLET, COATED ORAL at 08:05

## 2024-05-26 RX ADMIN — SODIUM CHLORIDE, POTASSIUM CHLORIDE, SODIUM LACTATE AND CALCIUM CHLORIDE: 600; 310; 30; 20 INJECTION, SOLUTION INTRAVENOUS at 11:05

## 2024-05-26 RX ADMIN — METOPROLOL TARTRATE 12.5 MG: 25 TABLET, FILM COATED ORAL at 08:05

## 2024-05-26 RX ADMIN — CITALOPRAM HYDROBROMIDE 40 MG: 20 TABLET, FILM COATED ORAL at 08:05

## 2024-05-26 RX ADMIN — CETIRIZINE HYDROCHLORIDE 10 MG: 10 TABLET, FILM COATED ORAL at 08:05

## 2024-05-26 RX ADMIN — INSULIN ASPART 3 UNITS: 100 INJECTION, SOLUTION INTRAVENOUS; SUBCUTANEOUS at 08:05

## 2024-05-26 RX ADMIN — FINASTERIDE 5 MG: 5 TABLET, FILM COATED ORAL at 08:05

## 2024-05-26 RX ADMIN — INSULIN DETEMIR 3 UNITS: 100 INJECTION, SOLUTION SUBCUTANEOUS at 08:05

## 2024-05-26 RX ADMIN — LISINOPRIL 5 MG: 5 TABLET ORAL at 08:05

## 2024-05-26 RX ADMIN — ATORVASTATIN CALCIUM 80 MG: 80 TABLET, FILM COATED ORAL at 08:05

## 2024-05-26 RX ADMIN — MONTELUKAST 10 MG: 10 TABLET, FILM COATED ORAL at 08:05

## 2024-05-26 RX ADMIN — Medication 6 MG: at 11:05

## 2024-05-26 RX ADMIN — INSULIN ASPART 2 UNITS: 100 INJECTION, SOLUTION INTRAVENOUS; SUBCUTANEOUS at 01:05

## 2024-05-26 RX ADMIN — SODIUM CHLORIDE, POTASSIUM CHLORIDE, SODIUM LACTATE AND CALCIUM CHLORIDE: 600; 310; 30; 20 INJECTION, SOLUTION INTRAVENOUS at 03:05

## 2024-05-26 NOTE — SUBJECTIVE & OBJECTIVE
Interval History:  no acute events overnight    Review of Systems   Constitutional:  Positive for fatigue. Negative for activity change, appetite change, chills, diaphoresis and fever.   HENT:  Negative for congestion.    Eyes: Negative.    Respiratory:  Positive for chest tightness. Negative for cough, choking, shortness of breath, wheezing and stridor.    Cardiovascular:  Positive for chest pain. Negative for palpitations and leg swelling.   Gastrointestinal:  Negative for abdominal distention, abdominal pain, anal bleeding, blood in stool, constipation, nausea and vomiting.   Genitourinary:  Negative for dysuria, flank pain, frequency, hematuria and urgency.   Musculoskeletal:  Negative for back pain.   Skin: Negative.    Neurological:  Positive for weakness. Negative for light-headedness, numbness and headaches.   Hematological:  Negative for adenopathy.   Psychiatric/Behavioral: Negative.       Objective:     Vital Signs (Most Recent):  Temp: 97.7 °F (36.5 °C) (05/26/24 1203)  Pulse: 60 (05/26/24 1203)  Resp: 17 (05/26/24 1203)  BP: 129/74 (05/26/24 1203)  SpO2: 98 % (05/26/24 1203) Vital Signs (24h Range):  Temp:  [97.3 °F (36.3 °C)-98.2 °F (36.8 °C)] 97.7 °F (36.5 °C)  Pulse:  [60-70] 60  Resp:  [16-19] 17  SpO2:  [94 %-98 %] 98 %  BP: (115-148)/(65-87) 129/74     Weight: 87.1 kg (192 lb 0.3 oz)  Body mass index is 30.99 kg/m².    Intake/Output Summary (Last 24 hours) at 5/26/2024 1504  Last data filed at 5/26/2024 0752  Gross per 24 hour   Intake 2682.63 ml   Output --   Net 2682.63 ml         Physical Exam  Vitals and nursing note reviewed.   Constitutional:       General: He is not in acute distress.     Appearance: Normal appearance. He is not ill-appearing or diaphoretic.   HENT:      Head: Normocephalic and atraumatic.      Nose: No congestion or rhinorrhea.   Eyes:      Extraocular Movements: Extraocular movements intact.      Conjunctiva/sclera: Conjunctivae normal.      Pupils: Pupils are equal,  round, and reactive to light.   Cardiovascular:      Rate and Rhythm: Normal rate and regular rhythm.      Pulses: Normal pulses.      Heart sounds: Normal heart sounds. No murmur heard.  Pulmonary:      Effort: Pulmonary effort is normal. No respiratory distress.      Breath sounds: Normal breath sounds. No wheezing.   Abdominal:      General: Bowel sounds are normal. There is no distension.      Palpations: Abdomen is soft.      Tenderness: There is no abdominal tenderness.   Musculoskeletal:      Cervical back: Normal range of motion and neck supple. No rigidity.      Right lower leg: No edema.      Left lower leg: No edema.   Skin:     General: Skin is warm.      Capillary Refill: Capillary refill takes less than 2 seconds.   Neurological:      General: No focal deficit present.      Mental Status: He is alert and oriented to person, place, and time.             Significant Labs: All pertinent labs within the past 24 hours have been reviewed.    Significant Imaging: I have reviewed all pertinent imaging results/findings within the past 24 hours.

## 2024-05-26 NOTE — PLAN OF CARE
Problem: Diabetes Comorbidity  Goal: Blood Glucose Level Within Targeted Range  Outcome: Progressing  Intervention: Monitor and Manage Glycemia  Flowsheets (Taken 5/26/2024 0512)  Glycemic Management: blood glucose monitored     Problem: Fall Injury Risk  Goal: Absence of Fall and Fall-Related Injury  Outcome: Progressing  Intervention: Identify and Manage Contributors  Flowsheets (Taken 5/26/2024 0512)  Self-Care Promotion: independence encouraged  Medication Review/Management: medications reviewed

## 2024-05-26 NOTE — ASSESSMENT & PLAN NOTE
Body mass index is 30.99 kg/m². Morbid obesity complicates all aspects of disease management from diagnostic modalities to treatment. Weight loss encouraged and health benefits explained to patient.

## 2024-05-26 NOTE — PROGRESS NOTES
[facility] - [address] [place] [address] Medicine  Progress Note    Patient Name: Richard Soliz  MRN: [id number]  Patient Class: IP- Inpatient   Admission Date: 5/25/2024  Length of Stay: 1 days  Attending Physician: Amandeep Comer DO  Primary Care Provider: Valentina Mcintyre FNP        Subjective:     Principal Problem:NSTEMI (non-ST elevated myocardial infarction)        HPI:  Patient is a 67 yo male with PMH of DM, BPH, anxiety, and depression. He presented to the [facility] with the complaint of chest pain that started about 2 days ago. He stated that it started off as chest tightness located in the mid of his chest. He graded it being 6-7/10. The pain was episodic with a single episode lasting about 30 minutes. The pain was non radiating. The pain was relieved by Mylanta. Patient denied accompanying SOB, palpitations, leg swelling, N/V, dizziness/lightheadedness, or syncope.  Tonight, he was woken up by the same symptoms but with increased intensity. This time pain radiated to his right arm and neck.  Patient reported orthopnea and excessive tiredness for past few months.  Patient doesn't see a cardiologist. He has family history of cardiac diseases.  Patient lives with wife. He is ambulatory and independent in ADLs.  ED course:  P        BP         Temp          pO2  75 141/75     afebrile   94 %  1st troponin: 1535.9  EKG: Abnormal EKG  CBC  10.90 15.8 222    49.2         BMP  139 104 15 286   4.7 27 1.35       Patient is being admitted to Hospital Medicine Service under care for further evaluation and management.            Overview/Hospital Course:    5/26 no acute events overnight plan for left heart catheterization Tuesday    Interval History:  no acute events overnight    Review of Systems   Constitutional:  Positive for fatigue. Negative for activity change, appetite change, chills, diaphoresis and fever.   HENT:  Negative for congestion.    Eyes: Negative.    Respiratory:  Positive for  chest tightness. Negative for cough, choking, shortness of breath, wheezing and stridor.    Cardiovascular:  Positive for chest pain. Negative for palpitations and leg swelling.   Gastrointestinal:  Negative for abdominal distention, abdominal pain, anal bleeding, blood in stool, constipation, nausea and vomiting.   Genitourinary:  Negative for dysuria, flank pain, frequency, hematuria and urgency.   Musculoskeletal:  Negative for back pain.   Skin: Negative.    Neurological:  Positive for weakness. Negative for light-headedness, numbness and headaches.   Hematological:  Negative for adenopathy.   Psychiatric/Behavioral: Negative.       Objective:     Vital Signs (Most Recent):  Temp: 97.7 °F (36.5 °C) (05/26/24 1203)  Pulse: 60 (05/26/24 1203)  Resp: 17 (05/26/24 1203)  BP: 129/74 (05/26/24 1203)  SpO2: 98 % (05/26/24 1203) Vital Signs (24h Range):  Temp:  [97.3 °F (36.3 °C)-98.2 °F (36.8 °C)] 97.7 °F (36.5 °C)  Pulse:  [60-70] 60  Resp:  [16-19] 17  SpO2:  [94 %-98 %] 98 %  BP: (115-148)/(65-87) 129/74     Weight: 87.1 kg (192 lb 0.3 oz)  Body mass index is 30.99 kg/m².    Intake/Output Summary (Last 24 hours) at 5/26/2024 1504  Last data filed at 5/26/2024 0752  Gross per 24 hour   Intake 2682.63 ml   Output --   Net 2682.63 ml         Physical Exam  Vitals and nursing note reviewed.   Constitutional:       General: He is not in acute distress.     Appearance: Normal appearance. He is not ill-appearing or diaphoretic.   HENT:      Head: Normocephalic and atraumatic.      Nose: No congestion or rhinorrhea.   Eyes:      Extraocular Movements: Extraocular movements intact.      Conjunctiva/sclera: Conjunctivae normal.      Pupils: Pupils are equal, round, and reactive to light.   Cardiovascular:      Rate and Rhythm: Normal rate and regular rhythm.      Pulses: Normal pulses.      Heart sounds: Normal heart sounds. No murmur heard.  Pulmonary:      Effort: Pulmonary effort is normal. No respiratory distress.       Breath sounds: Normal breath sounds. No wheezing.   Abdominal:      General: Bowel sounds are normal. There is no distension.      Palpations: Abdomen is soft.      Tenderness: There is no abdominal tenderness.   Musculoskeletal:      Cervical back: Normal range of motion and neck supple. No rigidity.      Right lower leg: No edema.      Left lower leg: No edema.   Skin:     General: Skin is warm.      Capillary Refill: Capillary refill takes less than 2 seconds.   Neurological:      General: No focal deficit present.      Mental Status: He is alert and oriented to person, place, and time.             Significant Labs: All pertinent labs within the past 24 hours have been reviewed.    Significant Imaging: I have reviewed all pertinent imaging results/findings within the past 24 hours.    Assessment/Plan:      * NSTEMI (non-ST elevated myocardial infarction)  Patient presented to the [facility] ED with complaints of chest pain.  - 1st troponin was 1535.9, second reading pending  - EKG showed T wave inversions  in anterolateral leads  - ECHO pending  - patient on cardiac monitoring  - cardiology consulted  - patient is on ACS protocol  - EDITH score is 4  - Heart Score: 8  - risk of MACE of 50-65%    Plan for left heart catheterization Tuesday    Sleep apnea   CPAP at night      Cardiomyopathy  Patient is identified as having Systolic (HFrEF) heart failure that is Acute on chronic. CHF is currently controlled. Latest ECHO performed and demonstrates- Results for orders placed during the hospital encounter of 05/25/24    Echo    Interpretation Summary    Left Ventricle: The left ventricle is normal in size. Mildly increased wall thickness. There is concentric hypertrophy. Mild global hypokinesis present. There is mildly reduced systolic function with a visually estimated ejection fraction of 45 - 50%. Ejection fraction by visual approximation is 55%. There is normal diastolic function.    Right Ventricle: Normal right  "ventricular cavity size. Systolic function is normal.    Aortic Valve: The aortic valve is structurally normal. Mildly calcified cusps.    Mitral Valve: There is mild bileaflet sclerosis. Mildly thickened leaflets. There is mild regurgitation with an eccentric jet.    IVC/SVC: Normal venous pressure at 3 mmHg.    Pericardium: There is a trivial effusion. No indication of cardiac tamponade.  . Continue Beta Blocker and monitor clinical status closely. Monitor on telemetry. Patient is off CHF pathway.  Monitor strict Is&Os and daily weights.  Place on fluid restriction of 2 L. Cardiology has been consulted. Continue to stress to patient importance of self efficacy and  on diet for CHF. Last BNP reviewed- and noted below No results for input(s): "BNP", "BNPTRIAGEBLO" in the last 168 hours.       Depression  Patient has persistent depression which is mild and is currently uncontrolled. Will Continue anti-depressant medications. We will not consult psychiatry at this time. Patient does not display psychosis at this time. Continue to monitor closely and adjust plan of care as needed.        Obesity, diabetes, and hypertension syndrome  Body mass index is 30.99 kg/m². Morbid obesity complicates all aspects of disease management from diagnostic modalities to treatment. Weight loss encouraged and health benefits explained to patient.         Hypertension  Chronic, uncontrolled. Latest blood pressure and vitals reviewed-     Temp:  [97.3 °F (36.3 °C)-98.2 °F (36.8 °C)]   Pulse:  [60-70]   Resp:  [16-19]   BP: (115-148)/(65-87)   SpO2:  [94 %-98 %] .   Home meds for hypertension were reviewed and noted below.       While in the hospital, will manage blood pressure as follows; Continue home antihypertensive regimen    Will utilize p.r.n. blood pressure medication only if patient's blood pressure greater than 180/110 and he develops symptoms such as worsening chest pain or shortness of breath.    COPD (chronic obstructive " "pulmonary disease)  Patient's COPD is controlled currently.  Patient is currently off COPD Pathway. Continue scheduled inhalers Supplemental oxygen and monitor respiratory status closely.     Anxiety    Continued home medications    Screening for malignant neoplasm of colon        Diabetes mellitus without complication  Patient's FSGs are uncontrolled due to hyperglycemia on current medication regimen.  Last A1c reviewed-   Lab Results   Component Value Date    HGBA1C 8.1 (H) 05/25/2024     Most recent fingerstick glucose reviewed- No results for input(s): "POCTGLUCOSE" in the last 24 hours.  Current correctional scale  Low  Maintain anti-hyperglycemic dose as follows-   Antihyperglycemics (From admission, onward)      Start     Stop Route Frequency Ordered    05/25/24 0631  insulin aspart U-100 injection 0-5 Units         -- SubQ Every 6 hours PRN 05/25/24 0532          Hold Oral hypoglycemics while patient is in the hospital.  5/26 increase insulin, add basal    BPH with obstruction/lower urinary tract symptoms    Continue home medications  Voiding okay      VTE Risk Mitigation (From admission, onward)           Ordered     heparin 25,000 units in dextrose 5% 250 mL (100 units/mL) infusion LOW INTENSITY nomogram - [facility]  Continuous        Question:  Begin at (units/kg/hr)  Answer:  12    05/25/24 0531     heparin 25,000 units in dextrose 5% (100 units/ml) IV bolus from bag LOW INTENSITY nomogram - [facility]  As needed (PRN)        Question:  Heparin Infusion Adjustment (DO NOT MODIFY ANSWER)  Answer:  [email or web address]\heparin LOW INTENSITY nomogram for [facility] [id number]    05/25/24 0531     heparin 25,000 units in dextrose 5% (100 units/ml) IV bolus from bag LOW INTENSITY nomogram - [facility]  As needed (PRN)        Question:  Heparin Infusion Adjustment (DO NOT MODIFY ANSWER)  Answer:  [email or web address]\[email or web address]\HeparinInfusions\heparin LOW INTENSITY nomogram for [facility] [id number]    " 05/25/24 0531     Reason for No Pharmacological VTE Prophylaxis  Once        Comments: Patient will be on heparin infusion for ACS   Question:  Reasons:  Answer:  Physician Provided (leave comment)    05/25/24 0531     IP VTE HIGH RISK PATIENT  Once         05/25/24 0531     Place sequential compression device  Until discontinued         05/25/24 0531                    Discharge Planning   ADEBAYO:      Code Status: Full Code   Is the patient medically ready for discharge?:     Reason for patient still in hospital (select all that apply): Treatment  Discharge Plan A: Home with family             Continue heparin infusion, monitor for toxicity.      Amandeep Comer DO  Department of Hospital Medicine   [facility] - [address] [facility] Telemetry

## 2024-05-26 NOTE — ASSESSMENT & PLAN NOTE
Patient has persistent depression which is mild and is currently uncontrolled. Will Continue anti-depressant medications. We will not consult psychiatry at this time. Patient does not display psychosis at this time. Continue to monitor closely and adjust plan of care as needed.

## 2024-05-26 NOTE — HOSPITAL COURSE
5/27- Plan for [facility] tomorrow.   5/28- LHC showed 3 vessel CAD, cardiology recommends CABG eval at [facility]- patient accepted.   5/29- Awaiting bed at [facility].   5/30- Still awaiting bed at [facility].

## 2024-05-26 NOTE — ASSESSMENT & PLAN NOTE
"Patient is identified as having Systolic (HFrEF) heart failure that is Acute on chronic. CHF is currently controlled. Latest ECHO performed and demonstrates- Results for orders placed during the hospital encounter of 05/25/24    Echo    Interpretation Summary    Left Ventricle: The left ventricle is normal in size. Mildly increased wall thickness. There is concentric hypertrophy. Mild global hypokinesis present. There is mildly reduced systolic function with a visually estimated ejection fraction of 45 - 50%. Ejection fraction by visual approximation is 55%. There is normal diastolic function.    Right Ventricle: Normal right ventricular cavity size. Systolic function is normal.    Aortic Valve: The aortic valve is structurally normal. Mildly calcified cusps.    Mitral Valve: There is mild bileaflet sclerosis. Mildly thickened leaflets. There is mild regurgitation with an eccentric jet.    IVC/SVC: Normal venous pressure at 3 mmHg.    Pericardium: There is a trivial effusion. No indication of cardiac tamponade.  . Continue Beta Blocker and monitor clinical status closely. Monitor on telemetry. Patient is off CHF pathway.  Monitor strict Is&Os and daily weights.  Place on fluid restriction of 2 L. Cardiology has been consulted. Continue to stress to patient importance of self efficacy and  on diet for CHF. Last BNP reviewed- and noted below No results for input(s): "BNP", "BNPTRIAGEBLO" in the last 168 hours.     "

## 2024-05-26 NOTE — ASSESSMENT & PLAN NOTE
"Patient's FSGs are uncontrolled due to hyperglycemia on current medication regimen.  Last A1c reviewed-   Lab Results   Component Value Date    HGBA1C 8.1 (H) 05/25/2024     Most recent fingerstick glucose reviewed- No results for input(s): "POCTGLUCOSE" in the last 24 hours.  Current correctional scale  Low  Maintain anti-hyperglycemic dose as follows-   Antihyperglycemics (From admission, onward)      Start     Stop Route Frequency Ordered    05/25/24 0631  insulin aspart U-100 injection 0-5 Units         -- SubQ Every 6 hours PRN 05/25/24 0532          Hold Oral hypoglycemics while patient is in the hospital.  5/26 increase insulin, add basal  " Detail Level: Zone

## 2024-05-26 NOTE — ASSESSMENT & PLAN NOTE
Chronic, uncontrolled. Latest blood pressure and vitals reviewed-     Temp:  [97.3 °F (36.3 °C)-98.2 °F (36.8 °C)]   Pulse:  [60-70]   Resp:  [16-19]   BP: (115-148)/(65-87)   SpO2:  [94 %-98 %] .   Home meds for hypertension were reviewed and noted below.       While in the hospital, will manage blood pressure as follows; Continue home antihypertensive regimen    Will utilize p.r.n. blood pressure medication only if patient's blood pressure greater than 180/110 and he develops symptoms such as worsening chest pain or shortness of breath.

## 2024-05-26 NOTE — PLAN OF CARE
[facility]  Initial Discharge Assessment       Primary Care Provider: Valentina Mcintyre FNP    Admission Diagnosis: NSTEMI (non-ST elevated myocardial infarction) [I21.4]  Chest pain [R07.9]    Admission Date: 5/25/2024  Expected Discharge Date:     Transition of Care Barriers: (P) None    Payor: MEDICARE / Plan: MEDICARE PART A & B / Product Type: Government /     Extended Emergency Contact Information  Primary Emergency Contact: ROSA MORGANTTE  Mobile Phone: [phone]  Relation: Spouse  Preferred language: English   needed? No    Discharge Plan A: (P) Home with family  Discharge Plan B: (P) Home with family      [facility] [address] [place] [address] - [address] [place] [address]  [address] [place] [address]  Phone: [phone] Fax: [phone]    [facility], [place] - [address]  [address] [place] [address]  Phone: [phone] Fax: [phone]    [place], [place] - [address]  [address] [facility] [address]  Phone: [phone] Fax: [phone]      Initial Assessment (most recent)       Adult Discharge Assessment - 05/26/24 1412          Discharge Assessment    Assessment Type Discharge Planning Assessment (P)      Confirmed/corrected address, phone number and insurance Yes (P)      Confirmed Demographics Correct on Facesheet (P)      Source of Information patient (P)      Communicated ADEBAYO with patient/caregiver Date not available/Unable to determine (P)      People in Home spouse (P)      Do you expect to return to your current living situation? Yes (P)      Do you have help at home or someone to help you manage your care at home? Yes (P)      Who are your caregiver(s) and their phone number(s)? SpouseNoe [phone] (P)      Prior to hospitilization cognitive status: Alert/Oriented (P)      Current cognitive status: Alert/Oriented (P)      Walking or Climbing Stairs  "Difficulty yes (P)    "I give out of breath."    Dressing/Bathing Difficulty no (P)      Home Accessibility wheelchair accessible (P)      Home Layout Able to live on 1st floor (P)      Equipment Currently Used at Home none (P)      Readmission within 30 days? No (P)      Patient currently being followed by outpatient case management? No (P)      Do you currently have service(s) that help you manage your care at home? No (P)      Do you take prescription medications? Yes (P)      Do you have prescription coverage? Yes (P)      Coverage Medicare A & B (P)      Do you have any problems affording any of your prescribed medications? No (P)      Is the patient taking medications as prescribed? yes (P)      Who is going to help you get home at discharge? Spouse, Awilda Soliz (P)      How do you get to doctors appointments? car, drives self (P)      Are you on dialysis? No (P)      Do you take coumadin? No (P)    "I take brilinta."    Discharge Plan A Home with family (P)      Discharge Plan B Home with family (P)      DME Needed Upon Discharge  none (P)      Discharge Plan discussed with: Patient (P)      Transition of Care Barriers None (P)         Physical Activity    On average, how many days per week do you engage in moderate to strenuous exercise (like a brisk walk)? 3 days (P)      On average, how many minutes do you engage in exercise at this level? 20 min (P)         Financial Resource Strain    How hard is it for you to pay for the very basics like food, housing, medical care, and heating? Not hard at all (P)         Housing Stability    In the last 12 months, was there a time when you were not able to pay the mortgage or rent on time? No (P)      At any time in the past 12 months, were you homeless or living in a shelter (including now)? No (P)         Transportation Needs    Has the lack of transportation kept you from medical appointments, meetings, work or from getting things needed for daily living? No (P) "         Food Insecurity    Within the past 12 months, you worried that your food would run out before you got the money to buy more. Never true (P)      Within the past 12 months, the food you bought just didn't last and you didn't have money to get more. Never true (P)         Stress    Do you feel stress - tense, restless, nervous, or anxious, or unable to sleep at night because your mind is troubled all the time - these days? Not at all (P)         Social Isolation    How often do you feel lonely or isolated from those around you?  Never (P)         Alcohol Use    Q1: How often do you have a drink containing alcohol? Never (P)      Q2: How many drinks containing alcohol do you have on a typical day when you are drinking? Patient does not drink (P)      Q3: How often do you have six or more drinks on one occasion? Never (P)         Utilities    In the past 12 months has the electric, gas, oil, or water company threatened to shut off services in your home? No (P)         Health Literacy    How often do you need to have someone help you when you read instructions, pamphlets, or other written material from your doctor or pharmacy? Sometimes (P)         OTHER    Name(s) of People in Home Spouse, Awilda Soliz (P)                  Pt was seen by SW on today for initial assessment. Pt was alert and oriented X 3. He is  and lives with his spouse, Awilda Soliz. Pt has Medicare A & B. Pt stated that he will return home when eligible for discharge. IM was obtained and SS will follow up with discharge planning.

## 2024-05-27 LAB
APTT PPP: 60.1 SECONDS (ref 25.2–37.3)
APTT PPP: 60.9 SECONDS (ref 25.2–37.3)
APTT PPP: 71.4 SECONDS (ref 25.2–37.3)
BASOPHILS # BLD AUTO: 0.08 K/UL (ref 0–0.2)
BASOPHILS NFR BLD AUTO: 0.9 % (ref 0–1)
DIFFERENTIAL METHOD BLD: ABNORMAL
EOSINOPHIL # BLD AUTO: 0.3 K/UL (ref 0–0.5)
EOSINOPHIL NFR BLD AUTO: 3.3 % (ref 1–4)
ERYTHROCYTE [DISTWIDTH] IN BLOOD BY AUTOMATED COUNT: 12.5 % (ref 11.5–14.5)
GLUCOSE SERPL-MCNC: 167 MG/DL (ref 70–105)
GLUCOSE SERPL-MCNC: 224 MG/DL (ref 70–105)
GLUCOSE SERPL-MCNC: 273 MG/DL (ref 70–105)
HCT VFR BLD AUTO: 38.7 % (ref 40–54)
HGB BLD-MCNC: 12.6 G/DL (ref 13.5–18)
IMM GRANULOCYTES # BLD AUTO: 0.06 K/UL (ref 0–0.04)
IMM GRANULOCYTES NFR BLD: 0.7 % (ref 0–0.4)
LYMPHOCYTES # BLD AUTO: 2.56 K/UL (ref 1–4.8)
LYMPHOCYTES NFR BLD AUTO: 27.8 % (ref 27–41)
MCH RBC QN AUTO: 30.7 PG (ref 27–31)
MCHC RBC AUTO-ENTMCNC: 32.6 G/DL (ref 32–36)
MCV RBC AUTO: 94.4 FL (ref 80–96)
MONOCYTES # BLD AUTO: 0.81 K/UL (ref 0–0.8)
MONOCYTES NFR BLD AUTO: 8.8 % (ref 2–6)
MPC BLD CALC-MCNC: 8.8 FL (ref 9.4–12.4)
NEUTROPHILS # BLD AUTO: 5.41 K/UL (ref 1.8–7.7)
NEUTROPHILS NFR BLD AUTO: 58.5 % (ref 53–65)
NRBC # BLD AUTO: 0 X10E3/UL
NRBC, AUTO (.00): 0 %
PLATELET # BLD AUTO: 178 K/UL (ref 150–400)
RBC # BLD AUTO: 4.1 M/UL (ref 4.6–6.2)
WBC # BLD AUTO: 9.22 K/UL (ref 4.5–11)

## 2024-05-27 PROCEDURE — 63600175 PHARM REV CODE 636 W HCPCS: Performed by: INTERNAL MEDICINE

## 2024-05-27 PROCEDURE — 36415 COLL VENOUS BLD VENIPUNCTURE: CPT | Performed by: STUDENT IN AN ORGANIZED HEALTH CARE EDUCATION/TRAINING PROGRAM

## 2024-05-27 PROCEDURE — 94761 N-INVAS EAR/PLS OXIMETRY MLT: CPT

## 2024-05-27 PROCEDURE — 99233 SBSQ HOSP IP/OBS HIGH 50: CPT | Mod: ,,, | Performed by: INTERNAL MEDICINE

## 2024-05-27 PROCEDURE — 94660 CPAP INITIATION&MGMT: CPT

## 2024-05-27 PROCEDURE — 25000003 PHARM REV CODE 250: Performed by: INTERNAL MEDICINE

## 2024-05-27 PROCEDURE — 99233 SBSQ HOSP IP/OBS HIGH 50: CPT | Mod: ,,, | Performed by: STUDENT IN AN ORGANIZED HEALTH CARE EDUCATION/TRAINING PROGRAM

## 2024-05-27 PROCEDURE — 63600175 PHARM REV CODE 636 W HCPCS: Performed by: STUDENT IN AN ORGANIZED HEALTH CARE EDUCATION/TRAINING PROGRAM

## 2024-05-27 PROCEDURE — 85025 COMPLETE CBC W/AUTO DIFF WBC: CPT | Performed by: INTERNAL MEDICINE

## 2024-05-27 PROCEDURE — 11000001 HC ACUTE MED/SURG PRIVATE ROOM

## 2024-05-27 PROCEDURE — 99900035 HC TECH TIME PER 15 MIN (STAT)

## 2024-05-27 PROCEDURE — 93010 ELECTROCARDIOGRAM REPORT: CPT | Mod: ,,, | Performed by: INTERNAL MEDICINE

## 2024-05-27 PROCEDURE — 82962 GLUCOSE BLOOD TEST: CPT

## 2024-05-27 PROCEDURE — 85730 THROMBOPLASTIN TIME PARTIAL: CPT | Performed by: STUDENT IN AN ORGANIZED HEALTH CARE EDUCATION/TRAINING PROGRAM

## 2024-05-27 PROCEDURE — 93005 ELECTROCARDIOGRAM TRACING: CPT

## 2024-05-27 RX ORDER — INSULIN ASPART 100 [IU]/ML
3 INJECTION, SOLUTION INTRAVENOUS; SUBCUTANEOUS
Status: DISCONTINUED | OUTPATIENT
Start: 2024-05-27 | End: 2024-05-30 | Stop reason: HOSPADM

## 2024-05-27 RX ADMIN — HEPARIN SODIUM 16 UNITS/KG/HR: 10000 INJECTION, SOLUTION INTRAVENOUS at 12:05

## 2024-05-27 RX ADMIN — ASPIRIN 81 MG: 81 TABLET, COATED ORAL at 09:05

## 2024-05-27 RX ADMIN — SODIUM CHLORIDE, POTASSIUM CHLORIDE, SODIUM LACTATE AND CALCIUM CHLORIDE: 600; 310; 30; 20 INJECTION, SOLUTION INTRAVENOUS at 10:05

## 2024-05-27 RX ADMIN — CITALOPRAM HYDROBROMIDE 40 MG: 20 TABLET, FILM COATED ORAL at 09:05

## 2024-05-27 RX ADMIN — METOPROLOL TARTRATE 12.5 MG: 25 TABLET, FILM COATED ORAL at 09:05

## 2024-05-27 RX ADMIN — SODIUM CHLORIDE, POTASSIUM CHLORIDE, SODIUM LACTATE AND CALCIUM CHLORIDE: 600; 310; 30; 20 INJECTION, SOLUTION INTRAVENOUS at 12:05

## 2024-05-27 RX ADMIN — PANTOPRAZOLE SODIUM 40 MG: 40 TABLET, DELAYED RELEASE ORAL at 09:05

## 2024-05-27 RX ADMIN — FINASTERIDE 5 MG: 5 TABLET, FILM COATED ORAL at 09:05

## 2024-05-27 RX ADMIN — INSULIN ASPART 3 UNITS: 100 INJECTION, SOLUTION INTRAVENOUS; SUBCUTANEOUS at 06:05

## 2024-05-27 RX ADMIN — INSULIN DETEMIR 3 UNITS: 100 INJECTION, SOLUTION SUBCUTANEOUS at 09:05

## 2024-05-27 RX ADMIN — TICAGRELOR 90 MG: 90 TABLET ORAL at 09:05

## 2024-05-27 RX ADMIN — ATORVASTATIN CALCIUM 80 MG: 80 TABLET, FILM COATED ORAL at 09:05

## 2024-05-27 RX ADMIN — CETIRIZINE HYDROCHLORIDE 10 MG: 10 TABLET, FILM COATED ORAL at 09:05

## 2024-05-27 RX ADMIN — LISINOPRIL 5 MG: 5 TABLET ORAL at 09:05

## 2024-05-27 RX ADMIN — MONTELUKAST 10 MG: 10 TABLET, FILM COATED ORAL at 09:05

## 2024-05-27 NOTE — ASSESSMENT & PLAN NOTE
Chronic, uncontrolled. Latest blood pressure and vitals reviewed-     Temp:  [96.1 °F (35.6 °C)-98 °F (36.7 °C)]   Pulse:  [56-68]   Resp:  [17-18]   BP: (114-163)/(59-99)   SpO2:  [93 %-98 %] .   Home meds for hypertension were reviewed and noted below.       While in the hospital, will manage blood pressure as follows; Continue home antihypertensive regimen    Will utilize p.r.n. blood pressure medication only if patient's blood pressure greater than 180/110 and he develops symptoms such as worsening chest pain or shortness of breath.  controlled

## 2024-05-27 NOTE — ASSESSMENT & PLAN NOTE
Patient presented to the [facility] ED with complaints of chest pain.  - 1st troponin was 1535.9, second reading pending  - EKG showed T wave inversions  in anterolateral leads  - ECHO pending  - patient on cardiac monitoring  - cardiology consulted  - patient is on ACS protocol  - EDITH score is 4  - Heart Score: 8  - risk of MACE of 50-65%    Plan for left heart catheterization Tuesday  [facility] saige

## 2024-05-27 NOTE — SUBJECTIVE & OBJECTIVE
Interval History:  no acute events overnight    Review of Systems   Constitutional:  Positive for fatigue. Negative for activity change, appetite change, chills, diaphoresis and fever.   HENT:  Negative for congestion.    Eyes: Negative.    Respiratory:  Positive for chest tightness. Negative for cough, choking, shortness of breath, wheezing and stridor.    Cardiovascular:  Positive for chest pain. Negative for palpitations and leg swelling.   Gastrointestinal:  Negative for abdominal distention, abdominal pain, anal bleeding, blood in stool, constipation, nausea and vomiting.   Genitourinary:  Negative for dysuria, flank pain, frequency, hematuria and urgency.   Musculoskeletal:  Negative for back pain.   Skin: Negative.    Neurological:  Positive for weakness. Negative for light-headedness, numbness and headaches.   Hematological:  Negative for adenopathy.   Psychiatric/Behavioral: Negative.       Objective:     Vital Signs (Most Recent):  Temp: 97.9 °F (36.6 °C) (05/27/24 1224)  Pulse: 60 (05/27/24 1224)  Resp: 17 (05/27/24 1224)  BP: 129/77 (05/27/24 1224)  SpO2: 95 % (05/27/24 1224) Vital Signs (24h Range):  Temp:  [96.1 °F (35.6 °C)-98 °F (36.7 °C)] 97.9 °F (36.6 °C)  Pulse:  [56-68] 60  Resp:  [17-18] 17  SpO2:  [93 %-98 %] 95 %  BP: (114-163)/(59-99) 129/77     Weight: 87.1 kg (192 lb 0.3 oz)  Body mass index is 30.99 kg/m².    Intake/Output Summary (Last 24 hours) at 5/27/2024 1441  Last data filed at 5/27/2024 0609  Gross per 24 hour   Intake 1305.08 ml   Output --   Net 1305.08 ml         Physical Exam  Vitals and nursing note reviewed.   Constitutional:       General: He is not in acute distress.     Appearance: Normal appearance. He is not ill-appearing or diaphoretic.   HENT:      Head: Normocephalic and atraumatic.      Nose: No congestion or rhinorrhea.   Eyes:      Extraocular Movements: Extraocular movements intact.      Conjunctiva/sclera: Conjunctivae normal.      Pupils: Pupils are equal, round,  and reactive to light.   Cardiovascular:      Rate and Rhythm: Normal rate and regular rhythm.      Pulses: Normal pulses.      Heart sounds: Normal heart sounds. No murmur heard.  Pulmonary:      Effort: Pulmonary effort is normal. No respiratory distress.      Breath sounds: Normal breath sounds. No wheezing.   Abdominal:      General: Bowel sounds are normal. There is no distension.      Palpations: Abdomen is soft.      Tenderness: There is no abdominal tenderness.   Musculoskeletal:      Cervical back: Normal range of motion and neck supple. No rigidity.      Right lower leg: No edema.      Left lower leg: No edema.   Skin:     General: Skin is warm.      Capillary Refill: Capillary refill takes less than 2 seconds.   Neurological:      General: No focal deficit present.      Mental Status: He is alert and oriented to person, place, and time.             Significant Labs: All pertinent labs within the past 24 hours have been reviewed.    Significant Imaging: I have reviewed all pertinent imaging results/findings within the past 24 hours.

## 2024-05-27 NOTE — ASSESSMENT & PLAN NOTE
NSTEMI, chest pain had resolved with medical tx, reoccurred walking in hallway today, has resolved back at rest,  recommend LHC/poss, reviewed indications, risks and benefits with pt and wife, elect to proceed in AM.

## 2024-05-27 NOTE — ASSESSMENT & PLAN NOTE
Chronic, controlled. Latest blood pressure and vitals reviewed-     Temp:  [96.1 °F (35.6 °C)-97.9 °F (36.6 °C)]   Pulse:  [56-68]   Resp:  [16-18]   BP: (114-163)/(59-99)   SpO2:  [93 %-98 %] .   Home meds for hypertension were reviewed and noted below.       While in the hospital, will manage blood pressure as follows; Continue home antihypertensive regimen    Will utilize p.r.n. blood pressure medication only if patient's blood pressure greater than 180/110 and he develops symptoms such as worsening chest pain or shortness of breath.

## 2024-05-27 NOTE — PLAN OF CARE
Problem: Adult Inpatient Plan of Care  Goal: Plan of Care Review  Outcome: Progressing  Goal: Patient-Specific Goal (Individualized)  Outcome: Progressing  Goal: Absence of Hospital-Acquired Illness or Injury  Outcome: Progressing  Goal: Optimal Comfort and Wellbeing  Outcome: Progressing  Goal: Readiness for Transition of Care  Outcome: Progressing     Problem: Diabetes Comorbidity  Goal: Blood Glucose Level Within Targeted Range  Outcome: Progressing     Problem: Fall Injury Risk  Goal: Absence of Fall and Fall-Related Injury  Outcome: Progressing     Problem: Gas Exchange Impaired  Goal: Optimal Gas Exchange  Outcome: Progressing

## 2024-05-27 NOTE — PROGRESS NOTES
[facility] - [facility]  Cardiology  Progress Note    Patient Name: Richard Soliz  MRN: [id number]  Admission Date: 5/25/2024  Hospital Length of Stay: 1 days  Code Status: Full Code   Attending Physician: Amandeep Comer DO   Primary Care Physician: Valentina Mcintyre FNP  Expected Discharge Date:   Principal Problem:NSTEMI (non-ST elevated myocardial infarction)    Subjective:     Hospital Course: PT admitted with NSTEMI, placed on optimal therapy, continues chest pain free    Interval History: Pt reports chest pain has resolved, is ambulatory in his room without provocation of chest pain, pressure or shortness of breath.     Review of Systems   Constitutional: Negative for diaphoresis, malaise/fatigue, night sweats and weight gain.   HENT:  Negative for congestion, ear pain, hearing loss, nosebleeds and sore throat.    Eyes:  Negative for blurred vision, double vision, pain, photophobia and visual disturbance.   Cardiovascular:  Positive for chest pain. Negative for claudication, dyspnea on exertion, irregular heartbeat, leg swelling, near-syncope, orthopnea, palpitations and syncope.   Respiratory:  Positive for shortness of breath. Negative for cough, sleep disturbances due to breathing, snoring and wheezing.    Endocrine: Negative for cold intolerance, heat intolerance, polydipsia, polyphagia and polyuria.   Hematologic/Lymphatic: Negative for bleeding problem. Does not bruise/bleed easily.   Skin:  Negative for dry skin, flushing, itching, rash and skin cancer.   Musculoskeletal:  Negative for arthritis, back pain, falls, joint pain, muscle cramps, muscle weakness and myalgias.   Gastrointestinal:  Negative for abdominal pain, change in bowel habit, constipation, diarrhea, dysphagia, heartburn, nausea and vomiting.   Genitourinary:  Negative for bladder incontinence, dysuria, flank pain, frequency and nocturia.   Neurological:  Negative for dizziness, focal weakness, headaches,  light-headedness, loss of balance, numbness, paresthesias and seizures.   Psychiatric/Behavioral:  Negative for depression, memory loss and substance abuse. The patient is not nervous/anxious.    Allergic/Immunologic: Negative for environmental allergies.     Objective:     Vital Signs (Most Recent):  Temp: 97.4 °F (36.3 °C) (05/26/24 2009)  Pulse: 65 (05/26/24 2009)  Resp: 18 (05/26/24 2009)  BP: (!) 155/91 (05/26/24 2009)  SpO2: 96 % (05/26/24 2009) Vital Signs (24h Range):  Temp:  [97.3 °F (36.3 °C)-98.1 °F (36.7 °C)] 97.4 °F (36.3 °C)  Pulse:  [60-70] 65  Resp:  [16-18] 18  SpO2:  [94 %-98 %] 96 %  BP: (115-155)/(65-91) 155/91     Weight: 87.1 kg (192 lb 0.3 oz)  Body mass index is 30.99 kg/m².    SpO2: 96 %         Intake/Output Summary (Last 24 hours) at 5/26/2024 2012  Last data filed at 5/26/2024 0752  Gross per 24 hour   Intake 1334.7 ml   Output --   Net 1334.7 ml       Lines/Drains/Airways       Peripheral Intravenous Line  Duration                  Peripheral IV - Single Lumen 05/25/24 0310 20 G Anterior;Distal;Left Upper Arm 1 day         Peripheral IV - Single Lumen 05/25/24 0515 20 G Posterior;Right Hand 1 day                    Physical Exam  Vitals and nursing note reviewed.   Constitutional:       Appearance: Normal appearance. He is obese.   HENT:      Head: Normocephalic and atraumatic.      Right Ear: External ear normal.      Left Ear: External ear normal.   Eyes:      General: No scleral icterus.        Right eye: No discharge.         Left eye: No discharge.      Extraocular Movements: Extraocular movements intact.      Conjunctiva/sclera: Conjunctivae normal.      Pupils: Pupils are equal, round, and reactive to light.   Cardiovascular:      Rate and Rhythm: Normal rate and regular rhythm.      Pulses: Normal pulses.      Heart sounds: Normal heart sounds. No murmur heard.     No friction rub. No gallop.   Pulmonary:      Effort: Pulmonary effort is normal.      Breath sounds: Normal breath  sounds. No wheezing, rhonchi or rales.   Chest:      Chest wall: No tenderness.   Abdominal:      General: Abdomen is flat. Bowel sounds are normal. There is no distension.      Palpations: Abdomen is soft.      Tenderness: There is no abdominal tenderness. There is no guarding or rebound.   Musculoskeletal:         General: No swelling or tenderness. Normal range of motion.      Cervical back: Normal range of motion and neck supple.      Right lower leg: No edema.      Left lower leg: No edema.   Skin:     General: Skin is warm and dry.      Findings: No erythema or rash.   Neurological:      General: No focal deficit present.      Mental Status: He is alert and oriented to person, place, and time.      Cranial Nerves: No cranial nerve deficit.      Motor: No weakness.      Gait: Gait normal.   Psychiatric:         Mood and Affect: Mood normal.         Behavior: Behavior normal.         Thought Content: Thought content normal.         Judgment: Judgment normal.         Significant Labs: CMP   Recent Labs   Lab 05/25/24  0310 05/25/24  0540    139   K 4.7 4.6    104   CO2 27 29   * 229*   BUN 15 15   CREATININE 1.35* 1.17   CALCIUM 9.1 9.0   PROT 6.9  --    ALBUMIN 3.8  --    BILITOT 0.5  --    ALKPHOS 132*  --    AST 24  --    ALT 32  --    ANIONGAP 13 11    and CBC   Recent Labs   Lab 05/25/24  0310 05/25/24  0541 05/26/24  0420   WBC 10.90 10.21 10.33   HGB 15.8 15.0 13.8   HCT 49.2 45.3 41.6    212 202       Significant Imaging: Echocardiogram: Transthoracic echo (TTE) complete (Cupid Only):   Results for orders placed or performed during the hospital encounter of 05/25/24   Echo   Result Value Ref Range    BSA 2.01 m2    LVOT stroke volume 50.36 cm3    LVIDd 5.00 3.5 - 6.0 cm    LV Systolic Volume 92.56 mL    LV Systolic Volume Index 47.0 mL/m2    LVIDs 4.50 (A) 2.1 - 4.0 cm    LV Diastolic Volume 118.51 mL    LV Diastolic Volume Index 60.16 mL/m2    IVS 1.05 0.6 - 1.1 cm    LVOT  diameter 1.99 cm    LVOT area 3.1 cm2    FS 10 (A) 28 - 44 %    Left Ventricle Relative Wall Thickness 0.49 cm    Posterior Wall 1.23 (A) 0.6 - 1.1 cm    LV mass 217.61 g    LV Mass Index 110 g/m2    MV Peak E Bassam 0.62 m/s    TDI LATERAL 0.07 m/s    TDI SEPTAL 0.06 m/s    E/E' ratio 9.54 m/s    MV Peak A Bassam 0.95 m/s    E/A ratio 0.65     E wave deceleration time 206.72 msec    LV SEPTAL E/E' RATIO 10.33 m/s    LV LATERAL E/E' RATIO 8.86 m/s    LVOT peak bassam 0.71 m/s    Left Ventricular Outflow Tract Mean Velocity 0.50 cm/s    Left Ventricular Outflow Tract Mean Gradient 1.14 mmHg    RVDD 3.18 cm    TAPSE 1.69 cm    RV/LV Ratio 0.64 cm    LA size 3.05 cm    Left Atrium Major Axis 3.68 cm    RA Major Axis 3.84 cm    AV mean gradient 3 mmHg    AV peak gradient 4 mmHg    Ao peak bassam 1.00 m/s    Ao VTI 21.30 cm    LVOT peak VTI 16.20 cm    AV valve area 2.36 cm²    AV Velocity Ratio 0.71     AV index (prosthetic) 0.76     TEGAN by Velocity Ratio 2.21 cm²    PV PEAK VELOCITY 0.64 m/s    PV peak gradient 2 mmHg    Ao root annulus 2.77 cm    IVC diameter 1.64 cm    Mean e' 0.07 m/s    ZLVIDS 2.03     ZLVIDD -1.24     AORTIC VALVE CUSP SEPERATION 2.16 cm    EF 55 %    Est. RA pres 3 mmHg    Narrative      Left Ventricle: The left ventricle is normal in size. Mildly increased   wall thickness. There is concentric hypertrophy. Mild global hypokinesis   present. There is mildly reduced systolic function with a visually   estimated ejection fraction of 45 - 50%. Ejection fraction by visual   approximation is 55%. There is normal diastolic function.    Right Ventricle: Normal right ventricular cavity size. Systolic   function is normal.    Aortic Valve: The aortic valve is structurally normal. Mildly calcified   cusps.    Mitral Valve: There is mild bileaflet sclerosis. Mildly thickened   leaflets. There is mild regurgitation with an eccentric jet.    IVC/SVC: Normal venous pressure at 3 mmHg.    Pericardium: There is a trivial  effusion. No indication of cardiac   tamponade.       Assessment and Plan:     Brief HPI: PT reports chest pain has resolved, is ambulatory in his room without complaints.     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, E11.59, I15.2] 05/25/2024 Yes    Depression [F32.A] 05/25/2024 Yes    Cardiomyopathy [I42.9] 05/25/2024 Yes    Sleep apnea [G47.30] 05/25/2024 Yes    Anxiety [F41.9] 03/05/2024 Yes    Screening for malignant neoplasm of colon [Z12.11] 11/02/2022 Not Applicable    Diabetes mellitus without complication [E11.9] 05/19/2022 Yes    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, N13.8] 10/29/2021 Yes     Chronic      Problems Resolved During this Admission:       VTE Risk Mitigation (From admission, onward)           Ordered     heparin 25,000 units in dextrose 5% 250 mL (100 units/mL) infusion LOW INTENSITY nomogram - [facility]  Continuous        Question:  Begin at (units/kg/hr)  Answer:  12    05/25/24 0531     heparin 25,000 units in dextrose 5% (100 units/ml) IV bolus from bag LOW INTENSITY nomogram - [facility]  As needed (PRN)        Question:  Heparin Infusion Adjustment (DO NOT MODIFY ANSWER)  Answer:  \\[email or web address]\epic\Images\Pharmacy\HeparinInfusions\heparin LOW INTENSITY nomogram for [facility] [id number]    05/25/24 0531     heparin 25,000 units in dextrose 5% (100 units/ml) IV bolus from bag LOW INTENSITY nomogram - [facility]  As needed (PRN)        Question:  Heparin Infusion Adjustment (DO NOT MODIFY ANSWER)  Answer:  \[email or web address]\epic\Images\Pharmacy\HeparinInfusions\heparin LOW INTENSITY nomogram for [facility] [id number]    05/25/24 0531     Reason for No Pharmacological VTE Prophylaxis  Once        Comments: Patient will be on heparin  infusion for ACS   Question:  Reasons:  Answer:  Physician Provided (leave comment)    05/25/24 0531     IP VTE HIGH RISK PATIENT  Once         05/25/24 0531     Place sequential compression device  Until discontinued         05/25/24 0531                IMP/plan    NSTEMI: pain free on current meds, awaiting [facility] MURO.     Socrates Porter, DO  Cardiology  [facility] - [address] [place] [address]

## 2024-05-27 NOTE — ASSESSMENT & PLAN NOTE
Patient's COPD is controlled currently.  Patient is currently off COPD Pathway. Continue scheduled inhalers Supplemental oxygen and monitor respiratory status closely.   5/27 resp status stable

## 2024-05-27 NOTE — PROGRESS NOTES
[facility] - [address] [place] [address] Medicine  Progress Note    Patient Name: Richard Soliz  MRN: [id number]  Patient Class: IP- Inpatient   Admission Date: 5/25/2024  Length of Stay: 2 days  Attending Physician: Amandeep Comer DO  Primary Care Provider: Valentina Mcintyre FNP        Subjective:     Principal Problem:NSTEMI (non-ST elevated myocardial infarction)        HPI:  Patient is a 67 yo male with PMH of DM, BPH, anxiety, and depression. He presented to the [facility] with the complaint of chest pain that started about 2 days ago. He stated that it started off as chest tightness located in the mid of his chest. He graded it being 6-7/10. The pain was episodic with a single episode lasting about 30 minutes. The pain was non radiating. The pain was relieved by Mylanta. Patient denied accompanying SOB, palpitations, leg swelling, N/V, dizziness/lightheadedness, or syncope.  Tonight, he was woken up by the same symptoms but with increased intensity. This time pain radiated to his right arm and neck.  Patient reported orthopnea and excessive tiredness for past few months.  Patient doesn't see a cardiologist. He has family history of cardiac diseases.  Patient lives with wife. He is ambulatory and independent in ADLs.  ED course:  P        BP         Temp          pO2  75 141/75     afebrile   94 %  1st troponin: 1535.9  EKG: Abnormal EKG  CBC  10.90 15.8 222    49.2         BMP  139 104 15 286   4.7 27 1.35       Patient is being admitted to Hospital Medicine Service under care for further evaluation and management.            Overview/Hospital Course:    5/26 no acute events overnight plan for left heart catheterization Tuesday 5/27 [facility] tomorrow    Interval History:  no acute events overnight    Review of Systems   Constitutional:  Positive for fatigue. Negative for activity change, appetite change, chills, diaphoresis and fever.   HENT:  Negative for congestion.    Eyes: Negative.     Respiratory:  Positive for chest tightness. Negative for cough, choking, shortness of breath, wheezing and stridor.    Cardiovascular:  Positive for chest pain. Negative for palpitations and leg swelling.   Gastrointestinal:  Negative for abdominal distention, abdominal pain, anal bleeding, blood in stool, constipation, nausea and vomiting.   Genitourinary:  Negative for dysuria, flank pain, frequency, hematuria and urgency.   Musculoskeletal:  Negative for back pain.   Skin: Negative.    Neurological:  Positive for weakness. Negative for light-headedness, numbness and headaches.   Hematological:  Negative for adenopathy.   Psychiatric/Behavioral: Negative.       Objective:     Vital Signs (Most Recent):  Temp: 97.9 °F (36.6 °C) (05/27/24 1224)  Pulse: 60 (05/27/24 1224)  Resp: 17 (05/27/24 1224)  BP: 129/77 (05/27/24 1224)  SpO2: 95 % (05/27/24 1224) Vital Signs (24h Range):  Temp:  [96.1 °F (35.6 °C)-98 °F (36.7 °C)] 97.9 °F (36.6 °C)  Pulse:  [56-68] 60  Resp:  [17-18] 17  SpO2:  [93 %-98 %] 95 %  BP: (114-163)/(59-99) 129/77     Weight: 87.1 kg (192 lb 0.3 oz)  Body mass index is 30.99 kg/m².    Intake/Output Summary (Last 24 hours) at 5/27/2024 1441  Last data filed at 5/27/2024 0609  Gross per 24 hour   Intake 1305.08 ml   Output --   Net 1305.08 ml         Physical Exam  Vitals and nursing note reviewed.   Constitutional:       General: He is not in acute distress.     Appearance: Normal appearance. He is not ill-appearing or diaphoretic.   HENT:      Head: Normocephalic and atraumatic.      Nose: No congestion or rhinorrhea.   Eyes:      Extraocular Movements: Extraocular movements intact.      Conjunctiva/sclera: Conjunctivae normal.      Pupils: Pupils are equal, round, and reactive to light.   Cardiovascular:      Rate and Rhythm: Normal rate and regular rhythm.      Pulses: Normal pulses.      Heart sounds: Normal heart sounds. No murmur heard.  Pulmonary:      Effort: Pulmonary effort is normal. No  respiratory distress.      Breath sounds: Normal breath sounds. No wheezing.   Abdominal:      General: Bowel sounds are normal. There is no distension.      Palpations: Abdomen is soft.      Tenderness: There is no abdominal tenderness.   Musculoskeletal:      Cervical back: Normal range of motion and neck supple. No rigidity.      Right lower leg: No edema.      Left lower leg: No edema.   Skin:     General: Skin is warm.      Capillary Refill: Capillary refill takes less than 2 seconds.   Neurological:      General: No focal deficit present.      Mental Status: He is alert and oriented to person, place, and time.             Significant Labs: All pertinent labs within the past 24 hours have been reviewed.    Significant Imaging: I have reviewed all pertinent imaging results/findings within the past 24 hours.    Assessment/Plan:      * NSTEMI (non-ST elevated myocardial infarction)  Patient presented to the [facility] ED with complaints of chest pain.  - 1st troponin was 1535.9, second reading pending  - EKG showed T wave inversions  in anterolateral leads  - ECHO pending  - patient on cardiac monitoring  - cardiology consulted  - patient is on ACS protocol  - EDITH score is 4  - Heart Score: 8  - risk of MACE of 50-65%    Plan for left heart catheterization Tuesday  [facility] saige    Sleep apnea   CPAP at night  Pt can't tolerate    Cardiomyopathy  Patient is identified as having Systolic (HFrEF) heart failure that is Acute on chronic. CHF is currently controlled. Latest ECHO performed and demonstrates- Results for orders placed during the hospital encounter of 05/25/24    Echo    Interpretation Summary    Left Ventricle: The left ventricle is normal in size. Mildly increased wall thickness. There is concentric hypertrophy. Mild global hypokinesis present. There is mildly reduced systolic function with a visually estimated ejection fraction of 45 - 50%. Ejection fraction by visual approximation is 55%. There is normal  "diastolic function.    Right Ventricle: Normal right ventricular cavity size. Systolic function is normal.    Aortic Valve: The aortic valve is structurally normal. Mildly calcified cusps.    Mitral Valve: There is mild bileaflet sclerosis. Mildly thickened leaflets. There is mild regurgitation with an eccentric jet.    IVC/SVC: Normal venous pressure at 3 mmHg.    Pericardium: There is a trivial effusion. No indication of cardiac tamponade.  . Continue Beta Blocker and monitor clinical status closely. Monitor on telemetry. Patient is off CHF pathway.  Monitor strict Is&Os and daily weights.  Place on fluid restriction of 2 L. Cardiology has been consulted. Continue to stress to patient importance of self efficacy and  on diet for CHF. Last BNP reviewed- and noted below No results for input(s): "BNP", "BNPTRIAGEBLO" in the last 168 hours.   5/27 euvolemic    Depression  Patient has persistent depression which is mild and is currently uncontrolled. Will Continue anti-depressant medications. We will not consult psychiatry at this time. Patient does not display psychosis at this time. Continue to monitor closely and adjust plan of care as needed.    5/27 stable    Obesity, diabetes, and hypertension syndrome  Body mass index is 30.99 kg/m². Morbid obesity complicates all aspects of disease management from diagnostic modalities to treatment. Weight loss encouraged and health benefits explained to patient.         Hypertension  Chronic, uncontrolled. Latest blood pressure and vitals reviewed-     Temp:  [96.1 °F (35.6 °C)-98 °F (36.7 °C)]   Pulse:  [56-68]   Resp:  [17-18]   BP: (114-163)/(59-99)   SpO2:  [93 %-98 %] .   Home meds for hypertension were reviewed and noted below.       While in the hospital, will manage blood pressure as follows; Continue home antihypertensive regimen    Will utilize p.r.n. blood pressure medication only if patient's blood pressure greater than 180/110 and he develops symptoms such " "as worsening chest pain or shortness of breath.  controlled    COPD (chronic obstructive pulmonary disease)  Patient's COPD is controlled currently.  Patient is currently off COPD Pathway. Continue scheduled inhalers Supplemental oxygen and monitor respiratory status closely.   5/27 resp status stable    Anxiety    Continued home medications    Screening for malignant neoplasm of colon        Diabetes mellitus without complication  Patient's FSGs are uncontrolled due to hyperglycemia on current medication regimen.  Last A1c reviewed-   Lab Results   Component Value Date    HGBA1C 8.1 (H) 05/25/2024     Most recent fingerstick glucose reviewed- No results for input(s): "POCTGLUCOSE" in the last 24 hours.  Current correctional scale  Low  Maintain anti-hyperglycemic dose as follows-   Antihyperglycemics (From admission, onward)      Start     Stop Route Frequency Ordered    05/26/24 2100  insulin detemir U-100 injection 3 Units         -- SubQ Nightly 05/26/24 1506    05/25/24 0631  insulin aspart U-100 injection 0-5 Units         -- SubQ Every 6 hours PRN 05/25/24 0532          Hold Oral hypoglycemics while patient is in the hospital.  5/26 increase insulin, add basal  5/27 fasting looks ok, add prandial    BPH with obstruction/lower urinary tract symptoms    Continue home medications  Voiding okay      VTE Risk Mitigation (From admission, onward)           Ordered     heparin 25,000 units in dextrose 5% 250 mL (100 units/mL) infusion LOW INTENSITY nomogram - [facility]  Continuous        Question:  Begin at (units/kg/hr)  Answer:  12    05/25/24 0531     heparin 25,000 units in dextrose 5% (100 units/ml) IV bolus from bag LOW INTENSITY nomogram - [facility]  As needed (PRN)        Question:  Heparin Infusion Adjustment (DO NOT MODIFY ANSWER)  Answer:  [email or web address]\epic\Images\Pharmacy\HeparinInfusions\heparin LOW INTENSITY nomogram for [facility] [id number]    05/25/24 0531     heparin 25,000 units in dextrose 5% (100 units/ml) IV " bolus from bag LOW INTENSITY nomogram - [facility]  As needed (PRN)        Question:  Heparin Infusion Adjustment (DO NOT MODIFY ANSWER)  Answer:  [email or web address]\epic\Images\Pharmacy\HeparinInfusions\heparin LOW INTENSITY nomogram for [facility] [id number]    05/25/24 0531     Reason for No Pharmacological VTE Prophylaxis  Once        Comments: Patient will be on heparin infusion for ACS   Question:  Reasons:  Answer:  Physician Provided (leave comment)    05/25/24 0531     IP VTE HIGH RISK PATIENT  Once         05/25/24 0531     Place sequential compression device  Until discontinued         05/25/24 0531                    Discharge Planning   ADEBAYO:      Code Status: Full Code   Is the patient medically ready for discharge?:     Reason for patient still in hospital (select all that apply): Treatment  Discharge Plan A: Home with family        Continue heparin infusion, monitor for toxicity          Amandeep Comer DO  Department of Hospital Medicine   [facility] - [address] [place] [address]

## 2024-05-27 NOTE — ASSESSMENT & PLAN NOTE
"Patient's FSGs are uncontrolled due to hyperglycemia on current medication regimen.  Last A1c reviewed-   Lab Results   Component Value Date    HGBA1C 8.1 (H) 05/25/2024     Most recent fingerstick glucose reviewed- No results for input(s): "POCTGLUCOSE" in the last 24 hours.  Current correctional scale  Low  Maintain anti-hyperglycemic dose as follows-   Antihyperglycemics (From admission, onward)      Start     Stop Route Frequency Ordered    05/26/24 2100  insulin detemir U-100 injection 3 Units         -- SubQ Nightly 05/26/24 1506    05/25/24 0631  insulin aspart U-100 injection 0-5 Units         -- SubQ Every 6 hours PRN 05/25/24 0532          Hold Oral hypoglycemics while patient is in the hospital.  5/26 increase insulin, add basal  5/27 fasting looks ok, add prandial  " 71

## 2024-05-27 NOTE — ASSESSMENT & PLAN NOTE
"Patient is identified as having Systolic (HFrEF) heart failure that is Acute on chronic. CHF is currently controlled. Latest ECHO performed and demonstrates- Results for orders placed during the hospital encounter of 05/25/24    Echo    Interpretation Summary    Left Ventricle: The left ventricle is normal in size. Mildly increased wall thickness. There is concentric hypertrophy. Mild global hypokinesis present. There is mildly reduced systolic function with a visually estimated ejection fraction of 45 - 50%. Ejection fraction by visual approximation is 55%. There is normal diastolic function.    Right Ventricle: Normal right ventricular cavity size. Systolic function is normal.    Aortic Valve: The aortic valve is structurally normal. Mildly calcified cusps.    Mitral Valve: There is mild bileaflet sclerosis. Mildly thickened leaflets. There is mild regurgitation with an eccentric jet.    IVC/SVC: Normal venous pressure at 3 mmHg.    Pericardium: There is a trivial effusion. No indication of cardiac tamponade.  . Continue Beta Blocker and monitor clinical status closely. Monitor on telemetry. Patient is off CHF pathway.  Monitor strict Is&Os and daily weights.  Place on fluid restriction of 2 L. Cardiology has been consulted. Continue to stress to patient importance of self efficacy and  on diet for CHF. Last BNP reviewed- and noted below No results for input(s): "BNP", "BNPTRIAGEBLO" in the last 168 hours.   5/27 euvolemic  "

## 2024-05-27 NOTE — RESPIRATORY THERAPY
05/26/24 8670   Patient Assessment/Suction   Level of Consciousness (AVPU) alert   Skin Integrity   $ Wound Care Tech Time 15 min   Area Observed Bridge of nose   Skin Appearance without discoloration   Barrier used? Liquid Filled Cushion   PRE-TX-O2   Device (Oxygen Therapy) CPAP   $ Is the patient on Low Flow Oxygen? Yes   $ Noninvasive Daily Charge Noninvasive Daily   Oxygen Analyzed Concentration (%) 25 %   SpO2 98 %   Pulse Oximetry Type Intermittent   $ Pulse Oximetry - Multiple Charge Pulse Oximetry - Multiple   Preset CPAP/BiPAP Settings   Mode Of Delivery CPAP   $ CPAP/BiPAP Daily Charge BiPAP/CPAP Daily   $ Initial CPAP/BiPAP Setup? Yes   $ Is patient using? Yes   Size of Mask Medium/Large   Sized Appropriately? Yes   Equipment Type Vision   Airway Device Type medium full face mask   CPAP (cm H2O) 7   Patient CPAP/BiPAP Settings   CPAP/BIPAP ID [id number]   RR Total (Breaths/Min) 15   Tidal Volume (mL) 818   VE Minute Ventilation (L/min) 11 L/min   Peak Inspiratory Pressure (cm H2O) 12   TiTOT (%) 17   Total Leak (L/Min) 23   CPAP/BiPAP Backup Settings   FIO2 Backup 25 %   CPAP/BiPAP Alarms   High Pressure (cm H2O) 50   Low Pressure (cm H2O) 2   Low Pressure Delay (Sec) 20   Minute Ventilation (L/Min) 2   High RR (breaths/min) 50   Low RR (breaths/min) 4   Apnea (Sec) 40   Education   $ Education Other (see comment)  (CPAP)

## 2024-05-27 NOTE — ASSESSMENT & PLAN NOTE
Patient has persistent depression which is mild and is currently uncontrolled. Will Continue anti-depressant medications. We will not consult psychiatry at this time. Patient does not display psychosis at this time. Continue to monitor closely and adjust plan of care as needed.    5/27 stable

## 2024-05-27 NOTE — PROGRESS NOTES
[facility] - [address] [place], [place] [address]etry  Cardiology  Progress Note    Patient Name: Richard Soliz  MRN: [id number]  Admission Date: 5/25/2024  Hospital Length of Stay: 2 days  Code Status: Full Code   Attending Physician: Amandeep Comer DO   Primary Care Physician: Valentina Mcintyre FNP  Expected Discharge Date:   Principal Problem:NSTEMI (non-ST elevated myocardial infarction)    Subjective:     Hospital Course:   Pt admitted, started on optimal tx, chest pain reoccured while walking in hallway, lasted two to three minutes, resolved with sitting back down.     No new subjective & objective note has been filed under this hospital service since the last note was generated.    Assessment and Plan:     Brief HPI: PT reports chest tightness with ambulation, has resolved with rest.     * NSTEMI (non-ST elevated myocardial infarction)  NSTEMI, chest pain had resolved with medical tx, reoccurred walking in hallway today, has resolved back at rest,  recommend LHC/poss, reviewed indications, risks and benefits with pt and wife, elect to proceed in AM.     Hypertension  Chronic, controlled. Latest blood pressure and vitals reviewed-     Temp:  [96.1 °F (35.6 °C)-97.9 °F (36.6 °C)]   Pulse:  [56-68]   Resp:  [16-18]   BP: (114-163)/(59-99)   SpO2:  [93 %-98 %] .   Home meds for hypertension were reviewed and noted below.       While in the hospital, will manage blood pressure as follows; Continue home antihypertensive regimen    Will utilize p.r.n. blood pressure medication only if patient's blood pressure greater than 180/110 and he develops symptoms such as worsening chest pain or shortness of breath.    Anxiety  Controlled on current medications.     Screening for malignant neoplasm of colon  Following with psychiatry, well controlled.       Diabetes mellitus without complication  Patient's FSGs are uncontrolled due to hyperglycemia on current medication regimen.  Last A1c reviewed-   Lab Results   Component  "Value Date    HGBA1C 8.1 (H) 05/25/2024     Most recent fingerstick glucose reviewed- No results for input(s): "POCTGLUCOSE" in the last 24 hours.  Current correctional scale    Maintain anti-hyperglycemic dose as follows-   Antihyperglycemics (From admission, onward)      Start     Stop Route Frequency Ordered    05/25/24 0631  insulin aspart U-100 injection 0-5 Units         -- SubQ Every 6 hours PRN 05/25/24 0532          Hold Oral hypoglycemics while patient is in the hospital.        VTE Risk Mitigation (From admission, onward)           Ordered     heparin 25,000 units in dextrose 5% 250 mL (100 units/mL) infusion LOW INTENSITY nomogram - [facility]  Continuous        Question:  Begin at (units/kg/hr)  Answer:  12    05/25/24 0531     heparin 25,000 units in dextrose 5% (100 units/ml) IV bolus from bag LOW INTENSITY nomogram - [facility]  As needed (PRN)        Question:  Heparin Infusion Adjustment (DO NOT MODIFY ANSWER)  Answer:  \\[email or web address]\epic\Images\Pharmacy\HeparinInfusions\heparin LOW INTENSITY nomogram for [facility] [id number]    05/25/24 0531     heparin 25,000 units in dextrose 5% (100 units/ml) IV bolus from bag LOW INTENSITY nomogram - [facility]  As needed (PRN)        Question:  Heparin Infusion Adjustment (DO NOT MODIFY ANSWER)  Answer:  [email or web address]\epic\Images\Pharmacy\HeparinInfusions\heparin LOW INTENSITY nomogram for [facility] [id number]    05/25/24 0531     Reason for No Pharmacological VTE Prophylaxis  Once        Comments: Patient will be on heparin infusion for ACS   Question:  Reasons:  Answer:  Physician Provided (leave comment)    05/25/24 0531     IP VTE HIGH RISK PATIENT  Once         05/25/24 0531     Place sequential compression device  Until discontinued         05/25/24 0531                    Socrates Porter, DO  Cardiology  [facility] - [address] [place] [address]    "

## 2024-05-28 LAB
GLUCOSE SERPL-MCNC: 146 MG/DL (ref 70–105)
GLUCOSE SERPL-MCNC: 172 MG/DL (ref 70–105)
GLUCOSE SERPL-MCNC: 253 MG/DL (ref 70–105)
OHS QRS DURATION: 86 MS
OHS QTC CALCULATION: 397 MS

## 2024-05-28 PROCEDURE — 99239 HOSP IP/OBS DSCHRG MGMT >30: CPT | Mod: ,,, | Performed by: INTERNAL MEDICINE

## 2024-05-28 PROCEDURE — 63600175 PHARM REV CODE 636 W HCPCS: Performed by: INTERNAL MEDICINE

## 2024-05-28 PROCEDURE — 99152 MOD SED SAME PHYS/QHP 5/>YRS: CPT | Performed by: HOSPITALIST

## 2024-05-28 PROCEDURE — B2111ZZ FLUOROSCOPY OF MULTIPLE CORONARY ARTERIES USING LOW OSMOLAR CONTRAST: ICD-10-PCS | Performed by: HOSPITALIST

## 2024-05-28 PROCEDURE — C1894 INTRO/SHEATH, NON-LASER: HCPCS | Performed by: HOSPITALIST

## 2024-05-28 PROCEDURE — 25500020 PHARM REV CODE 255: Performed by: HOSPITALIST

## 2024-05-28 PROCEDURE — 27201423 OPTIME MED/SURG SUP & DEVICES STERILE SUPPLY: Performed by: HOSPITALIST

## 2024-05-28 PROCEDURE — 93458 L HRT ARTERY/VENTRICLE ANGIO: CPT | Mod: 26,,, | Performed by: HOSPITALIST

## 2024-05-28 PROCEDURE — 93458 L HRT ARTERY/VENTRICLE ANGIO: CPT | Performed by: HOSPITALIST

## 2024-05-28 PROCEDURE — 63600175 PHARM REV CODE 636 W HCPCS: Performed by: HOSPITALIST

## 2024-05-28 PROCEDURE — 94660 CPAP INITIATION&MGMT: CPT

## 2024-05-28 PROCEDURE — 25000003 PHARM REV CODE 250: Performed by: INTERNAL MEDICINE

## 2024-05-28 PROCEDURE — 99152 MOD SED SAME PHYS/QHP 5/>YRS: CPT | Mod: ,,, | Performed by: HOSPITALIST

## 2024-05-28 PROCEDURE — 99900035 HC TECH TIME PER 15 MIN (STAT)

## 2024-05-28 PROCEDURE — 4A023N7 MEASUREMENT OF CARDIAC SAMPLING AND PRESSURE, LEFT HEART, PERCUTANEOUS APPROACH: ICD-10-PCS | Performed by: HOSPITALIST

## 2024-05-28 PROCEDURE — 82962 GLUCOSE BLOOD TEST: CPT

## 2024-05-28 PROCEDURE — 25000003 PHARM REV CODE 250: Performed by: HOSPITALIST

## 2024-05-28 PROCEDURE — 99153 MOD SED SAME PHYS/QHP EA: CPT | Performed by: HOSPITALIST

## 2024-05-28 PROCEDURE — 94761 N-INVAS EAR/PLS OXIMETRY MLT: CPT

## 2024-05-28 PROCEDURE — 11000001 HC ACUTE MED/SURG PRIVATE ROOM

## 2024-05-28 PROCEDURE — 63600175 PHARM REV CODE 636 W HCPCS: Performed by: STUDENT IN AN ORGANIZED HEALTH CARE EDUCATION/TRAINING PROGRAM

## 2024-05-28 RX ORDER — NITROGLYCERIN 5 MG/ML
INJECTION, SOLUTION INTRAVENOUS
Status: DISCONTINUED | OUTPATIENT
Start: 2024-05-28 | End: 2024-05-28 | Stop reason: HOSPADM

## 2024-05-28 RX ORDER — SODIUM CHLORIDE 9 MG/ML
INJECTION, SOLUTION INTRAVENOUS
Status: DISCONTINUED | OUTPATIENT
Start: 2024-05-28 | End: 2024-05-30 | Stop reason: HOSPADM

## 2024-05-28 RX ORDER — FENTANYL CITRATE 50 UG/ML
INJECTION, SOLUTION INTRAMUSCULAR; INTRAVENOUS
Status: DISCONTINUED | OUTPATIENT
Start: 2024-05-28 | End: 2024-05-28 | Stop reason: HOSPADM

## 2024-05-28 RX ORDER — MIDAZOLAM HYDROCHLORIDE 1 MG/ML
INJECTION INTRAMUSCULAR; INTRAVENOUS
Status: DISCONTINUED | OUTPATIENT
Start: 2024-05-28 | End: 2024-05-28 | Stop reason: HOSPADM

## 2024-05-28 RX ORDER — SODIUM CHLORIDE 9 MG/ML
INJECTION, SOLUTION INTRAVENOUS CONTINUOUS
Status: DISCONTINUED | OUTPATIENT
Start: 2024-05-28 | End: 2024-05-30 | Stop reason: HOSPADM

## 2024-05-28 RX ORDER — LIDOCAINE HYDROCHLORIDE 10 MG/ML
INJECTION INFILTRATION; PERINEURAL
Status: DISCONTINUED | OUTPATIENT
Start: 2024-05-28 | End: 2024-05-28 | Stop reason: HOSPADM

## 2024-05-28 RX ORDER — ONDANSETRON HYDROCHLORIDE 2 MG/ML
4 INJECTION, SOLUTION INTRAVENOUS EVERY 12 HOURS PRN
Status: DISCONTINUED | OUTPATIENT
Start: 2024-05-28 | End: 2024-05-30 | Stop reason: HOSPADM

## 2024-05-28 RX ORDER — VERAPAMIL HYDROCHLORIDE 2.5 MG/ML
INJECTION, SOLUTION INTRAVENOUS
Status: DISCONTINUED | OUTPATIENT
Start: 2024-05-28 | End: 2024-05-28 | Stop reason: HOSPADM

## 2024-05-28 RX ORDER — HEPARIN SODIUM 5000 [USP'U]/ML
INJECTION, SOLUTION INTRAVENOUS; SUBCUTANEOUS
Status: DISCONTINUED | OUTPATIENT
Start: 2024-05-28 | End: 2024-05-28 | Stop reason: HOSPADM

## 2024-05-28 RX ADMIN — SODIUM CHLORIDE, POTASSIUM CHLORIDE, SODIUM LACTATE AND CALCIUM CHLORIDE: 600; 310; 30; 20 INJECTION, SOLUTION INTRAVENOUS at 08:05

## 2024-05-28 RX ADMIN — INSULIN ASPART 3 UNITS: 100 INJECTION, SOLUTION INTRAVENOUS; SUBCUTANEOUS at 06:05

## 2024-05-28 RX ADMIN — PANTOPRAZOLE SODIUM 40 MG: 40 TABLET, DELAYED RELEASE ORAL at 08:05

## 2024-05-28 RX ADMIN — METOPROLOL TARTRATE 12.5 MG: 25 TABLET, FILM COATED ORAL at 08:05

## 2024-05-28 RX ADMIN — INSULIN DETEMIR 3 UNITS: 100 INJECTION, SOLUTION SUBCUTANEOUS at 08:05

## 2024-05-28 RX ADMIN — MONTELUKAST 10 MG: 10 TABLET, FILM COATED ORAL at 08:05

## 2024-05-28 RX ADMIN — CETIRIZINE HYDROCHLORIDE 10 MG: 10 TABLET, FILM COATED ORAL at 08:05

## 2024-05-28 RX ADMIN — ATORVASTATIN CALCIUM 80 MG: 80 TABLET, FILM COATED ORAL at 08:05

## 2024-05-28 RX ADMIN — CITALOPRAM HYDROBROMIDE 40 MG: 20 TABLET, FILM COATED ORAL at 08:05

## 2024-05-28 RX ADMIN — ASPIRIN 81 MG: 81 TABLET, COATED ORAL at 08:05

## 2024-05-28 RX ADMIN — FINASTERIDE 5 MG: 5 TABLET, FILM COATED ORAL at 08:05

## 2024-05-28 RX ADMIN — TICAGRELOR 90 MG: 90 TABLET ORAL at 08:05

## 2024-05-28 RX ADMIN — LISINOPRIL 5 MG: 5 TABLET ORAL at 08:05

## 2024-05-28 NOTE — NURSING
Pt back to the unit via bed from cath lab ; tr band noted to right wrist. No bleeding noted and palpable pulse assessed. VSS, pt voices no complaints or concerns at this time. Safety measures in place, will monitor.

## 2024-05-28 NOTE — ASSESSMENT & PLAN NOTE
Patient presented to the [facility] ED with complaints of chest pain.  Troponin elevated.   Echo    Result Date: 5/25/2024    Left Ventricle: The left ventricle is normal in size. Mildly increased   wall thickness. There is concentric hypertrophy. Mild global hypokinesis   present. There is mildly reduced systolic function with a visually   estimated ejection fraction of 45 - 50%. Ejection fraction by visual   approximation is 55%. There is normal diastolic function.    Right Ventricle: Normal right ventricular cavity size. Systolic   function is normal.    Aortic Valve: The aortic valve is structurally normal. Mildly calcified   cusps.    Mitral Valve: There is mild bileaflet sclerosis. Mildly thickened   leaflets. There is mild regurgitation with an eccentric jet.    IVC/SVC: Normal venous pressure at 3 mmHg.    Pericardium: There is a trivial effusion. No indication of cardiac   tamponade.         EDITH score is 4  Heart Score: 8  Cardiology consulted; s/p LHC today which showed 3 vessel CAD, accepted at [facility] for CABG eval.   S/p heparin infusion, continue aspirin, statin, and beta blocker (hold Brillinta).   Monitor on tele.

## 2024-05-28 NOTE — DISCHARGE SUMMARY
[facility]  [facility] Medicine  Discharge Summary      Patient Name: Richard Soliz  MRN: [id number]  MELCHOR: [id number]  Patient Class: IP- Inpatient  Admission Date: 5/25/2024  Hospital Length of Stay: 3 days  Discharge Date and Time:  05/28/2024 2:45 PM  Attending Physician: Tree Greer MD   Discharging Provider: TREE GREER MD  Primary Care Provider: Valentina Mcintyre FNP    Primary Care Team: Networked reference to record PCT     HPI:   Patient is a 67 yo male with PMH of DM, BPH, anxiety, and depression. He presented to the [facility] with the complaint of chest pain that started about 2 days ago. He stated that it started off as chest tightness located in the mid of his chest. He graded it being 6-7/10. The pain was episodic with a single episode lasting about 30 minutes. The pain was non radiating. The pain was relieved by Mylanta. Patient denied accompanying SOB, palpitations, leg swelling, N/V, dizziness/lightheadedness, or syncope.  Tonight, he was woken up by the same symptoms but with increased intensity. This time pain radiated to his right arm and neck.  Patient reported orthopnea and excessive tiredness for past few months.  Patient doesn't see a cardiologist. He has family history of cardiac diseases.  Patient lives with wife. He is ambulatory and independent in ADLs.  ED course:  P        BP         Temp          pO2  75 141/75     afebrile   94 %  1st troponin: 1535.9  EKG: Abnormal EKG  CBC  10.90 15.8 222    49.2         BMP  139 104 15 286   4.7 27 1.35       Patient is being admitted to Hospital Medicine Service under care for further evaluation and management.            Procedure(s) (LRB):  ANGIOGRAM, CORONARY, INCLUDING BYPASS GRAFT, WITH LEFT HEART CATHETERIZATION (N/A)      Hospital Course:   5/27- Plan for LHC tomorrow.   5/28- LHC showed 3 vessel CAD, cardiology recommends CABG eval at [facility]- patient accepted.      Goals of Care Treatment Preferences:  Code  Status: Full Code      Consults:   Consults (From admission, onward)          Status Ordering Provider     Inpatient consult to Cardiology  Once        Provider:  Socrates Porter DO Completed KIM, MIN S            Psychiatric  Depression  Patient has persistent depression which is mild and is currently uncontrolled. Will Continue anti-depressant medications. We will not consult psychiatry at this time. Patient does not display psychosis at this time. Continue to monitor closely and adjust plan of care as needed.        Anxiety  Resume home citalopram.     Pulmonary  COPD (chronic obstructive pulmonary disease)  Patient's COPD is controlled currently.  Patient is currently off COPD Pathway. Continue scheduled inhalers Supplemental oxygen and monitor respiratory status closely.       Cardiac/Vascular  * NSTEMI (non-ST elevated myocardial infarction)  Patient presented to the [facility] ED with complaints of chest pain.  Troponin elevated.   Echo    Result Date: 5/25/2024    Left Ventricle: The left ventricle is normal in size. Mildly increased   wall thickness. There is concentric hypertrophy. Mild global hypokinesis   present. There is mildly reduced systolic function with a visually   estimated ejection fraction of 45 - 50%. Ejection fraction by visual   approximation is 55%. There is normal diastolic function.    Right Ventricle: Normal right ventricular cavity size. Systolic   function is normal.    Aortic Valve: The aortic valve is structurally normal. Mildly calcified   cusps.    Mitral Valve: There is mild bileaflet sclerosis. Mildly thickened   leaflets. There is mild regurgitation with an eccentric jet.    IVC/SVC: Normal venous pressure at 3 mmHg.    Pericardium: There is a trivial effusion. No indication of cardiac   tamponade.         EDITH score is 4  Heart Score: 8  Cardiology consulted; s/p LHC today which showed 3 vessel CAD, accepted at [facility] for CABG eval.   S/p heparin infusion, continue aspirin, statin,  "and beta blocker (hold Brillinta).   Monitor on tele.     Cardiomyopathy  Patient is identified as having Systolic (HFrEF) heart failure that is Acute on chronic. CHF is currently controlled. Latest ECHO performed and demonstrates- Results for orders placed during the hospital encounter of 05/25/24    Echo    Interpretation Summary    Left Ventricle: The left ventricle is normal in size. Mildly increased wall thickness. There is concentric hypertrophy. Mild global hypokinesis present. There is mildly reduced systolic function with a visually estimated ejection fraction of 45 - 50%. Ejection fraction by visual approximation is 55%. There is normal diastolic function.    Right Ventricle: Normal right ventricular cavity size. Systolic function is normal.    Aortic Valve: The aortic valve is structurally normal. Mildly calcified cusps.    Mitral Valve: There is mild bileaflet sclerosis. Mildly thickened leaflets. There is mild regurgitation with an eccentric jet.    IVC/SVC: Normal venous pressure at 3 mmHg.    Pericardium: There is a trivial effusion. No indication of cardiac tamponade.  . Continue Beta Blocker and monitor clinical status closely. Monitor on telemetry. Patient is off CHF pathway.  Monitor strict Is&Os and daily weights.  Place on fluid restriction of 2 L. Cardiology has been consulted. Continue to stress to patient importance of self efficacy and  on diet for CHF. Last BNP reviewed- and noted below No results for input(s): "BNP", "BNPTRIAGEBLO" in the last 168 hours.   5/27 euvolemic    Hypertension  Chronic, uncontrolled. Latest blood pressure and vitals reviewed-     Temp:  [97.4 °F (36.3 °C)-99.7 °F (37.6 °C)]   Pulse:  [60-66]   Resp:  [16-18]   BP: (124-169)/(75-94)   SpO2:  [92 %-97 %] .     Resume metoprolol and lisinopril.     While in the hospital, will manage blood pressure as follows; Continue home antihypertensive regimen    Will utilize p.r.n. blood pressure medication only if " "patient's blood pressure greater than 180/110 and he develops symptoms such as worsening chest pain or shortness of breath.  controlled    Renal/  BPH with obstruction/lower urinary tract symptoms  Resume home finasteride.      Endocrine  Obesity, diabetes, and hypertension syndrome  Body mass index is 30.99 kg/m². Morbid obesity complicates all aspects of disease management from diagnostic modalities to treatment. Weight loss encouraged and health benefits explained to patient.         Diabetes mellitus without complication  Patient's FSGs are uncontrolled due to hyperglycemia on current medication regimen.  Last A1c reviewed-   Lab Results   Component Value Date    HGBA1C 8.1 (H) 05/25/2024     Most recent fingerstick glucose reviewed- No results for input(s): "POCTGLUCOSE" in the last 24 hours.  Current correctional scale  Low  Maintain anti-hyperglycemic dose as follows-   Antihyperglycemics (From admission, onward)      Start     Stop Route Frequency Ordered    05/27/24 1645  insulin aspart U-100 injection 3 Units         -- SubQ 3 times daily with meals 05/27/24 1442    05/26/24 2100  insulin detemir U-100 injection 3 Units         -- SubQ Nightly 05/26/24 1506    05/25/24 0631  insulin aspart U-100 injection 0-5 Units         -- SubQ Every 6 hours PRN 05/25/24 0532          Hold Oral hypoglycemics while patient is in the hospital.  Adjust insulin as required.     Other  Sleep apnea   CPAP at night  Pt can't tolerate      Final 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, E11.59, I15.2] 05/25/2024 Yes    Depression [F32.A] 05/25/2024 Yes    Cardiomyopathy [I42.9] 05/25/2024 Yes    Sleep apnea [G47.30] 05/25/2024 Yes    Anxiety [F41.9] 03/05/2024 " "Yes    Diabetes mellitus without complication [E11.9] 05/19/2022 Yes    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, N13.8] 10/29/2021 Yes     Chronic      Problems Resolved During this Admission:       Discharged Condition: fair    Disposition: Another Health Care Inst*    Follow Up:   Follow-up Information       Alondra Bennett FNP Follow up on 7/9/2024.    Specialty: Cardiology  Why: Appointment scheduled on 7/9/2024 at 9:00  Contact information:  [address] [place] [address] [facility]  [place] [address]  [phone]                           Patient Instructions:      Cardiac rehab phase II   Standing Status: Future Standing Exp. Date: 05/28/25   Scheduling Instructions: Patient to be transferred directly to [facility] for CABG evaluation - please call with scheduling information in 1-2 weeks.     Order Specific Question Answer Comments   Department [facility] CARDIAC REHAB    Select qualifying diagnosis: I21.4 - Non-ST elevation (NSTEMI) myocardial infarction      EKG 12-lead   Standing Status: Future Standing Exp. Date: 05/27/25       Significant Diagnostic Studies: Labs: BMP: No results for input(s): "GLU", "NA", "K", "CL", "CO2", "BUN", "CREATININE", "CALCIUM", "MG" in the last 48 hours. and CBC   Recent Labs   Lab 05/27/24  0433   WBC 9.22   HGB 12.6*   HCT 38.7*          Pending Diagnostic Studies:       None           Medications:  Reconciled Home Medications:      Medication List        CONTINUE taking these medications      albuterol 90 mcg/actuation inhaler  Commonly known as: PROVENTIL HFA  Inhale 2 puffs into the lungs every 6 (six) hours as needed for Wheezing. Rescue     cetirizine 10 MG tablet  Commonly known as: ZYRTEC  TAKE ONE (1) TABLET  BY MOUTH ONCE DAILY.     cholecalciferol (vitamin D3) 125 mcg (5,000 unit) Tab  Take 5,000 Units by mouth once daily.     cinnamon bark-chromium picolin 500-100 mg-mcg Cap  Take 1 capsule by mouth once daily.     citalopram " 40 MG tablet  Commonly known as: CeleXA  Take 1 tablet (40 mg total) by mouth once daily.     empagliflozin 25 mg tablet  Commonly known as: JARDIANCE  Take 1 tablet (25 mg total) by mouth once daily.     finasteride 5 mg tablet  Commonly known as: PROSCAR  Take 1 tablet (5 mg total) by mouth once daily.     glucosamine-chondroitin 500-400 mg tablet  Take 1 tablet by mouth 3 (three) times daily.     metFORMIN 1000 MG tablet  Commonly known as: GLUCOPHAGE  Take 1 tablet (1,000 mg total) by mouth 2 (two) times a day.     montelukast 10 mg tablet  Commonly known as: SINGULAIR  Take 1 tablet (10 mg total) by mouth once daily.     OCUVITE ADULT 50 PLUS 250-5-1 mg Cap  Generic drug: C,E,zinc,copper 11-omega3s-lut  Take 1 tablet by mouth once daily.     omeprazole 20 MG capsule  Commonly known as: PRILOSEC  Take 20 mg by mouth once daily.     potassium citrate 99 mg Cap  Take by mouth.     rosuvastatin 10 MG tablet  Commonly known as: CRESTOR  Take 1 tablet (10 mg total) by mouth every evening.            STOP taking these medications      meloxicam 7.5 MG tablet  Commonly known as: MOBIC              Indwelling Lines/Drains at time of discharge:   Lines/Drains/Airways       None                   Time spent on the discharge of patient: 40 minutes         LYN RUEDA MD  Department of Hospital Medicine  [facility]

## 2024-05-28 NOTE — PROGRESS NOTES
[facility] Medicine  Progress Note    Patient Name: Richard Soliz  MRN: [id number]  Patient Class: IP- Inpatient   Admission Date: 5/25/2024  Length of Stay: 3 days  Attending Physician: Tree Greer MD  Primary Care Provider: Valentina Mcintyre FNP        Subjective:     Principal Problem:NSTEMI (non-ST elevated myocardial infarction)        HPI:  Patient is a 69 yo male with PMH of DM, BPH, anxiety, and depression. He presented to the [facility] with the complaint of chest pain that started about 2 days ago. He stated that it started off as chest tightness located in the mid of his chest. He graded it being 6-7/10. The pain was episodic with a single episode lasting about 30 minutes. The pain was non radiating. The pain was relieved by Mylanta. Patient denied accompanying SOB, palpitations, leg swelling, N/V, dizziness/lightheadedness, or syncope.  Tonight, he was woken up by the same symptoms but with increased intensity. This time pain radiated to his right arm and neck.  Patient reported orthopnea and excessive tiredness for past few months.  Patient doesn't see a cardiologist. He has family history of cardiac diseases.  Patient lives with wife. He is ambulatory and independent in ADLs.  ED course:  P        BP         Temp          pO2  75 141/75     afebrile   94 %  1st troponin: 1535.9  EKG: Abnormal EKG  CBC  10.90 15.8 222    49.2         BMP  139 104 15 286   4.7 27 1.35       Patient is being admitted to Hospital Medicine Service under care for further evaluation and management.            Overview/Hospital Course:  5/27- Plan for [facility] tomorrow.   5/28- [facility] pending.     Interval History: Patient seen and examined at the bedside, reports doing ok, no chest pain. Spouse updated at the bedside.     Review of Systems   All other systems reviewed and are negative.    Objective:     Vital Signs (Most Recent):  Temp: 97.7 °F (36.5 °C) (05/28/24 0843)  Pulse: 64 (05/28/24  0843)  Resp: 18 (05/28/24 0843)  BP: (!) 155/77 (05/28/24 0843)  SpO2: (!) 94 % (05/28/24 0843) Vital Signs (24h Range):  Temp:  [97.4 °F (36.3 °C)-99.7 °F (37.6 °C)] 97.7 °F (36.5 °C)  Pulse:  [60-66] 64  Resp:  [16-18] 18  SpO2:  [92 %-97 %] 94 %  BP: (124-169)/(75-94) 155/77     Weight: 87.1 kg (192 lb 0.3 oz)  Body mass index is 30.99 kg/m².  No intake or output data in the 24 hours ending 05/28/24 1336      Physical Exam  Vitals and nursing note reviewed.   Constitutional:       Appearance: Normal appearance.   HENT:      Head: Normocephalic and atraumatic.      Nose: Nose normal.      Mouth/Throat:      Mouth: Mucous membranes are moist.   Eyes:      Extraocular Movements: Extraocular movements intact.   Cardiovascular:      Rate and Rhythm: Normal rate and regular rhythm.      Pulses: Normal pulses.      Heart sounds: Normal heart sounds.   Pulmonary:      Effort: Pulmonary effort is normal.      Breath sounds: Normal breath sounds.   Abdominal:      General: Bowel sounds are normal.      Palpations: Abdomen is soft.   Musculoskeletal:         General: Normal range of motion.      Cervical back: Normal range of motion.   Skin:     General: Skin is warm.   Neurological:      General: No focal deficit present.      Mental Status: He is alert and oriented to person, place, and time. Mental status is at baseline.   Psychiatric:         Mood and Affect: Mood normal.         Behavior: Behavior normal.             Significant Labs: All pertinent labs within the past 24 hours have been reviewed.    Significant Imaging: I have reviewed all pertinent imaging results/findings within the past 24 hours.    Assessment/Plan:      * NSTEMI (non-ST elevated myocardial infarction)  Patient presented to the [facility] ED with complaints of chest pain.  Troponin elevated.   Echo    Result Date: 5/25/2024    Left Ventricle: The left ventricle is normal in size. Mildly increased   wall thickness. There is concentric hypertrophy. Mild  global hypokinesis   present. There is mildly reduced systolic function with a visually   estimated ejection fraction of 45 - 50%. Ejection fraction by visual   approximation is 55%. There is normal diastolic function.    Right Ventricle: Normal right ventricular cavity size. Systolic   function is normal.    Aortic Valve: The aortic valve is structurally normal. Mildly calcified   cusps.    Mitral Valve: There is mild bileaflet sclerosis. Mildly thickened   leaflets. There is mild regurgitation with an eccentric jet.    IVC/SVC: Normal venous pressure at 3 mmHg.    Pericardium: There is a trivial effusion. No indication of cardiac   tamponade.         EDITH score is 4  Heart Score: 8  Cardiology consulted; plan for [facility] today.  S/p heparin infusion, continue aspirin, statin, Brillinta and beta blocker.   Monitor on tele.     Sleep apnea   CPAP at night  Pt can't tolerate    Cardiomyopathy  Patient is identified as having Systolic (HFrEF) heart failure that is Acute on chronic. CHF is currently controlled. Latest ECHO performed and demonstrates- Results for orders placed during the hospital encounter of 05/25/24    Echo    Interpretation Summary    Left Ventricle: The left ventricle is normal in size. Mildly increased wall thickness. There is concentric hypertrophy. Mild global hypokinesis present. There is mildly reduced systolic function with a visually estimated ejection fraction of 45 - 50%. Ejection fraction by visual approximation is 55%. There is normal diastolic function.    Right Ventricle: Normal right ventricular cavity size. Systolic function is normal.    Aortic Valve: The aortic valve is structurally normal. Mildly calcified cusps.    Mitral Valve: There is mild bileaflet sclerosis. Mildly thickened leaflets. There is mild regurgitation with an eccentric jet.    IVC/SVC: Normal venous pressure at 3 mmHg.    Pericardium: There is a trivial effusion. No indication of cardiac tamponade.  . Continue Beta  "Blocker and monitor clinical status closely. Monitor on telemetry. Patient is off CHF pathway.  Monitor strict Is&Os and daily weights.  Place on fluid restriction of 2 L. Cardiology has been consulted. Continue to stress to patient importance of self efficacy and  on diet for CHF. Last BNP reviewed- and noted below No results for input(s): "BNP", "BNPTRIAGEBLO" in the last 168 hours.   5/27 euvolemic    Depression  Patient has persistent depression which is mild and is currently uncontrolled. Will Continue anti-depressant medications. We will not consult psychiatry at this time. Patient does not display psychosis at this time. Continue to monitor closely and adjust plan of care as needed.        Obesity, diabetes, and hypertension syndrome  Body mass index is 30.99 kg/m². Morbid obesity complicates all aspects of disease management from diagnostic modalities to treatment. Weight loss encouraged and health benefits explained to patient.         Hypertension  Chronic, uncontrolled. Latest blood pressure and vitals reviewed-     Temp:  [97.4 °F (36.3 °C)-99.7 °F (37.6 °C)]   Pulse:  [60-66]   Resp:  [16-18]   BP: (124-169)/(75-94)   SpO2:  [92 %-97 %] .     Resume metoprolol and lisinopril.     While in the hospital, will manage blood pressure as follows; Continue home antihypertensive regimen    Will utilize p.r.n. blood pressure medication only if patient's blood pressure greater than 180/110 and he develops symptoms such as worsening chest pain or shortness of breath.  controlled    COPD (chronic obstructive pulmonary disease)  Patient's COPD is controlled currently.  Patient is currently off COPD Pathway. Continue scheduled inhalers Supplemental oxygen and monitor respiratory status closely.       Anxiety  Resume home citalopram.     Screening for malignant neoplasm of colon        Diabetes mellitus without complication  Patient's FSGs are uncontrolled due to hyperglycemia on current medication " "regimen.  Last A1c reviewed-   Lab Results   Component Value Date    HGBA1C 8.1 (H) 05/25/2024     Most recent fingerstick glucose reviewed- No results for input(s): "POCTGLUCOSE" in the last 24 hours.  Current correctional scale  Low  Maintain anti-hyperglycemic dose as follows-   Antihyperglycemics (From admission, onward)      Start     Stop Route Frequency Ordered    05/27/24 1645  insulin aspart U-100 injection 3 Units         -- SubQ 3 times daily with meals 05/27/24 1442    05/26/24 2100  insulin detemir U-100 injection 3 Units         -- SubQ Nightly 05/26/24 1506    05/25/24 0631  insulin aspart U-100 injection 0-5 Units         -- SubQ Every 6 hours PRN 05/25/24 0532          Hold Oral hypoglycemics while patient is in the hospital.  Adjust insulin as required.     BPH with obstruction/lower urinary tract symptoms  Resume home finasteride.        VTE Risk Mitigation (From admission, onward)           Ordered     heparin (porcine) injection  As needed (PRN)         05/28/24 1330     Reason for No Pharmacological VTE Prophylaxis  Once        Comments: Patient will be on heparin infusion for ACS   Question:  Reasons:  Answer:  Physician Provided (leave comment)    05/25/24 0531     IP VTE HIGH RISK PATIENT  Once         05/25/24 0531     Place sequential compression device  Until discontinued         05/25/24 0531                    Discharge Planning   ADEBAYO: 5/29/2024     Code Status: Full Code   Is the patient medically ready for discharge?:     Reason for patient still in hospital (select all that apply): Patient trending condition and Consult recommendations  Discharge Plan A: Home with family                  LYN RUEDA MD  Department of Hospital Medicine   [facility]    "

## 2024-05-28 NOTE — ASSESSMENT & PLAN NOTE
"Patient is identified as having Systolic (HFrEF) heart failure that is Acute on chronic. CHF is currently controlled. Latest ECHO performed and demonstrates- Results for orders placed during the hospital encounter of 05/25/24    Echo    Interpretation Summary    Left Ventricle: The left ventricle is normal in size. Mildly increased wall thickness. There is concentric hypertrophy. Mild global hypokinesis present. There is mildly reduced systolic function with a visually estimated ejection fraction of 45 - 50%. Ejection fraction by visual approximation is 55%. There is normal diastolic function.    Right Ventricle: Normal right ventricular cavity size. Systolic function is normal.    Aortic Valve: The aortic valve is structurally normal. Mildly calcified cusps.    Mitral Valve: There is mild bileaflet sclerosis. Mildly thickened leaflets. There is mild regurgitation with an eccentric jet.    IVC/SVC: Normal venous pressure at 3 mmHg.    Pericardium: There is a trivial effusion. No indication of cardiac tamponade.  . Continue Beta Blocker and monitor clinical status closely. Monitor on telemetry. Patient is off CHF pathway.  Monitor strict Is&Os and daily weights.  Place on fluid restriction of 2 L. Cardiology has been consulted. Continue to stress to patient importance of self efficacy and  on diet for CHF. Last BNP reviewed- and noted below No results for input(s): "BNP", "BNPTRIAGEBLO" in the last 168 hours.   5/27 euvolemic  "

## 2024-05-28 NOTE — ASSESSMENT & PLAN NOTE
"Patient's FSGs are uncontrolled due to hyperglycemia on current medication regimen.  Last A1c reviewed-   Lab Results   Component Value Date    HGBA1C 8.1 (H) 05/25/2024     Most recent fingerstick glucose reviewed- No results for input(s): "POCTGLUCOSE" in the last 24 hours.  Current correctional scale  Low  Maintain anti-hyperglycemic dose as follows-   Antihyperglycemics (From admission, onward)      Start     Stop Route Frequency Ordered    05/27/24 1645  insulin aspart U-100 injection 3 Units         -- SubQ 3 times daily with meals 05/27/24 1442    05/26/24 2100  insulin detemir U-100 injection 3 Units         -- SubQ Nightly 05/26/24 1506    05/25/24 0631  insulin aspart U-100 injection 0-5 Units         -- SubQ Every 6 hours PRN 05/25/24 0532          Hold Oral hypoglycemics while patient is in the hospital.  Adjust insulin as required.   "

## 2024-05-28 NOTE — ASSESSMENT & PLAN NOTE
Patient presented to the [facility] ED with complaints of chest pain.  Troponin elevated.   Echo    Result Date: 5/25/2024    Left Ventricle: The left ventricle is normal in size. Mildly increased   wall thickness. There is concentric hypertrophy. Mild global hypokinesis   present. There is mildly reduced systolic function with a visually   estimated ejection fraction of 45 - 50%. Ejection fraction by visual   approximation is 55%. There is normal diastolic function.    Right Ventricle: Normal right ventricular cavity size. Systolic   function is normal.    Aortic Valve: The aortic valve is structurally normal. Mildly calcified   cusps.    Mitral Valve: There is mild bileaflet sclerosis. Mildly thickened   leaflets. There is mild regurgitation with an eccentric jet.    IVC/SVC: Normal venous pressure at 3 mmHg.    Pericardium: There is a trivial effusion. No indication of cardiac   tamponade.         EDITH score is 4  Heart Score: 8  Cardiology consulted; plan for LHC today.  S/p heparin infusion, continue aspirin, statin, Brillinta and beta blocker.   Monitor on tele.

## 2024-05-28 NOTE — SUBJECTIVE & OBJECTIVE
Interval History: Patient seen and examined at the bedside, reports doing ok, no chest pain. Spouse updated at the bedside.     Review of Systems   All other systems reviewed and are negative.    Objective:     Vital Signs (Most Recent):  Temp: 97.7 °F (36.5 °C) (05/28/24 0843)  Pulse: 64 (05/28/24 0843)  Resp: 18 (05/28/24 0843)  BP: (!) 155/77 (05/28/24 0843)  SpO2: (!) 94 % (05/28/24 0843) Vital Signs (24h Range):  Temp:  [97.4 °F (36.3 °C)-99.7 °F (37.6 °C)] 97.7 °F (36.5 °C)  Pulse:  [60-66] 64  Resp:  [16-18] 18  SpO2:  [92 %-97 %] 94 %  BP: (124-169)/(75-94) 155/77     Weight: 87.1 kg (192 lb 0.3 oz)  Body mass index is 30.99 kg/m².  No intake or output data in the 24 hours ending 05/28/24 1336      Physical Exam  Vitals and nursing note reviewed.   Constitutional:       Appearance: Normal appearance.   HENT:      Head: Normocephalic and atraumatic.      Nose: Nose normal.      Mouth/Throat:      Mouth: Mucous membranes are moist.   Eyes:      Extraocular Movements: Extraocular movements intact.   Cardiovascular:      Rate and Rhythm: Normal rate and regular rhythm.      Pulses: Normal pulses.      Heart sounds: Normal heart sounds.   Pulmonary:      Effort: Pulmonary effort is normal.      Breath sounds: Normal breath sounds.   Abdominal:      General: Bowel sounds are normal.      Palpations: Abdomen is soft.   Musculoskeletal:         General: Normal range of motion.      Cervical back: Normal range of motion.   Skin:     General: Skin is warm.   Neurological:      General: No focal deficit present.      Mental Status: He is alert and oriented to person, place, and time. Mental status is at baseline.   Psychiatric:         Mood and Affect: Mood normal.         Behavior: Behavior normal.             Significant Labs: All pertinent labs within the past 24 hours have been reviewed.    Significant Imaging: I have reviewed all pertinent imaging results/findings within the past 24 hours.

## 2024-05-28 NOTE — PLAN OF CARE
05/28/24 @ 1400 - Per morning huddle - pt is to have L heart cath today; possible d/c home today or tomorrow - if medically ready. CM will continue to follow.

## 2024-05-28 NOTE — ASSESSMENT & PLAN NOTE
Chronic, uncontrolled. Latest blood pressure and vitals reviewed-     Temp:  [97.4 °F (36.3 °C)-99.7 °F (37.6 °C)]   Pulse:  [60-66]   Resp:  [16-18]   BP: (124-169)/(75-94)   SpO2:  [92 %-97 %] .     Resume metoprolol and lisinopril.     While in the hospital, will manage blood pressure as follows; Continue home antihypertensive regimen    Will utilize p.r.n. blood pressure medication only if patient's blood pressure greater than 180/110 and he develops symptoms such as worsening chest pain or shortness of breath.  controlled

## 2024-05-29 LAB
ANION GAP SERPL CALCULATED.3IONS-SCNC: 12 MMOL/L (ref 7–16)
BUN SERPL-MCNC: 13 MG/DL (ref 7–18)
BUN/CREAT SERPL: 16 (ref 6–20)
CALCIUM SERPL-MCNC: 9.2 MG/DL (ref 8.5–10.1)
CHLORIDE SERPL-SCNC: 108 MMOL/L (ref 98–107)
CO2 SERPL-SCNC: 22 MMOL/L (ref 21–32)
CREAT SERPL-MCNC: 0.82 MG/DL (ref 0.7–1.3)
EGFR (NO RACE VARIABLE) (RUSH/TITUS): 97 ML/MIN/1.73M2
GLUCOSE SERPL-MCNC: 165 MG/DL (ref 70–105)
GLUCOSE SERPL-MCNC: 186 MG/DL (ref 74–106)
GLUCOSE SERPL-MCNC: 205 MG/DL (ref 70–105)
GLUCOSE SERPL-MCNC: 227 MG/DL (ref 70–105)
GLUCOSE SERPL-MCNC: 232 MG/DL (ref 70–105)
POTASSIUM SERPL-SCNC: 3.8 MMOL/L (ref 3.5–5.1)
SODIUM SERPL-SCNC: 138 MMOL/L (ref 136–145)

## 2024-05-29 PROCEDURE — 25000003 PHARM REV CODE 250: Performed by: INTERNAL MEDICINE

## 2024-05-29 PROCEDURE — 11000001 HC ACUTE MED/SURG PRIVATE ROOM

## 2024-05-29 PROCEDURE — 94761 N-INVAS EAR/PLS OXIMETRY MLT: CPT

## 2024-05-29 PROCEDURE — 82962 GLUCOSE BLOOD TEST: CPT

## 2024-05-29 PROCEDURE — 80048 BASIC METABOLIC PNL TOTAL CA: CPT | Performed by: INTERNAL MEDICINE

## 2024-05-29 PROCEDURE — 36415 COLL VENOUS BLD VENIPUNCTURE: CPT | Performed by: INTERNAL MEDICINE

## 2024-05-29 PROCEDURE — 63600175 PHARM REV CODE 636 W HCPCS: Performed by: NURSE PRACTITIONER

## 2024-05-29 PROCEDURE — 63600175 PHARM REV CODE 636 W HCPCS: Performed by: STUDENT IN AN ORGANIZED HEALTH CARE EDUCATION/TRAINING PROGRAM

## 2024-05-29 PROCEDURE — 99233 SBSQ HOSP IP/OBS HIGH 50: CPT | Mod: ,,, | Performed by: NURSE PRACTITIONER

## 2024-05-29 PROCEDURE — 25000003 PHARM REV CODE 250: Performed by: NURSE PRACTITIONER

## 2024-05-29 PROCEDURE — 99232 SBSQ HOSP IP/OBS MODERATE 35: CPT | Mod: ,,, | Performed by: INTERNAL MEDICINE

## 2024-05-29 RX ORDER — LISINOPRIL 10 MG/1
10 TABLET ORAL DAILY
Qty: 90 TABLET | Refills: 3 | Status: SHIPPED | OUTPATIENT
Start: 2024-05-30 | End: 2025-05-30

## 2024-05-29 RX ORDER — LISINOPRIL 10 MG/1
10 TABLET ORAL DAILY
Status: DISCONTINUED | OUTPATIENT
Start: 2024-05-30 | End: 2024-05-30 | Stop reason: HOSPADM

## 2024-05-29 RX ORDER — METOPROLOL TARTRATE 25 MG/1
25 TABLET, FILM COATED ORAL 2 TIMES DAILY
Status: DISCONTINUED | OUTPATIENT
Start: 2024-05-29 | End: 2024-05-30 | Stop reason: HOSPADM

## 2024-05-29 RX ORDER — METOPROLOL TARTRATE 25 MG/1
25 TABLET, FILM COATED ORAL 2 TIMES DAILY
Qty: 180 TABLET | Refills: 3 | Status: SHIPPED | OUTPATIENT
Start: 2024-05-29 | End: 2025-05-29

## 2024-05-29 RX ORDER — ENOXAPARIN SODIUM 100 MG/ML
1 INJECTION SUBCUTANEOUS EVERY 12 HOURS
Status: DISCONTINUED | OUTPATIENT
Start: 2024-05-29 | End: 2024-05-30 | Stop reason: HOSPADM

## 2024-05-29 RX ADMIN — CITALOPRAM HYDROBROMIDE 40 MG: 20 TABLET, FILM COATED ORAL at 09:05

## 2024-05-29 RX ADMIN — MONTELUKAST 10 MG: 10 TABLET, FILM COATED ORAL at 09:05

## 2024-05-29 RX ADMIN — PANTOPRAZOLE SODIUM 40 MG: 40 TABLET, DELAYED RELEASE ORAL at 09:05

## 2024-05-29 RX ADMIN — ENOXAPARIN SODIUM 90 MG: 100 INJECTION SUBCUTANEOUS at 12:05

## 2024-05-29 RX ADMIN — ENOXAPARIN SODIUM 90 MG: 100 INJECTION SUBCUTANEOUS at 09:05

## 2024-05-29 RX ADMIN — INSULIN ASPART 3 UNITS: 100 INJECTION, SOLUTION INTRAVENOUS; SUBCUTANEOUS at 05:05

## 2024-05-29 RX ADMIN — INSULIN ASPART 3 UNITS: 100 INJECTION, SOLUTION INTRAVENOUS; SUBCUTANEOUS at 09:05

## 2024-05-29 RX ADMIN — FINASTERIDE 5 MG: 5 TABLET, FILM COATED ORAL at 09:05

## 2024-05-29 RX ADMIN — LISINOPRIL 5 MG: 5 TABLET ORAL at 09:05

## 2024-05-29 RX ADMIN — ASPIRIN 81 MG: 81 TABLET, COATED ORAL at 09:05

## 2024-05-29 RX ADMIN — INSULIN DETEMIR 3 UNITS: 100 INJECTION, SOLUTION SUBCUTANEOUS at 09:05

## 2024-05-29 RX ADMIN — CETIRIZINE HYDROCHLORIDE 10 MG: 10 TABLET, FILM COATED ORAL at 09:05

## 2024-05-29 RX ADMIN — ATORVASTATIN CALCIUM 80 MG: 80 TABLET, FILM COATED ORAL at 09:05

## 2024-05-29 RX ADMIN — METOPROLOL TARTRATE 12.5 MG: 25 TABLET, FILM COATED ORAL at 09:05

## 2024-05-29 RX ADMIN — INSULIN ASPART 3 UNITS: 100 INJECTION, SOLUTION INTRAVENOUS; SUBCUTANEOUS at 12:05

## 2024-05-29 RX ADMIN — METOPROLOL TARTRATE 25 MG: 25 TABLET, FILM COATED ORAL at 09:05

## 2024-05-29 NOTE — PROGRESS NOTES
[facility] - [address] [place] [address] Medicine  Progress Note    Patient Name: Richard Soliz  MRN: [id number]  Patient Class: IP- Inpatient   Admission Date: 5/25/2024  Length of Stay: 4 days  Attending Physician: Tree Greer MD  Primary Care Provider: Valentina Mcintyre FNP        Subjective:     Principal Problem:NSTEMI (non-ST elevated myocardial infarction)        HPI:  Patient is a 69 yo male with PMH of DM, BPH, anxiety, and depression. He presented to the [facility] with the complaint of chest pain that started about 2 days ago. He stated that it started off as chest tightness located in the mid of his chest. He graded it being 6-7/10. The pain was episodic with a single episode lasting about 30 minutes. The pain was non radiating. The pain was relieved by Mylanta. Patient denied accompanying SOB, palpitations, leg swelling, N/V, dizziness/lightheadedness, or syncope.  Tonight, he was woken up by the same symptoms but with increased intensity. This time pain radiated to his right arm and neck.  Patient reported orthopnea and excessive tiredness for past few months.  Patient doesn't see a cardiologist. He has family history of cardiac diseases.  Patient lives with wife. He is ambulatory and independent in ADLs.  ED course:  P        BP         Temp          pO2  75 141/75     afebrile   94 %  1st troponin: 1535.9  EKG: Abnormal EKG  CBC  10.90 15.8 222    49.2         BMP  139 104 15 286   4.7 27 1.35       Patient is being admitted to Hospital Medicine Service under care for further evaluation and management.            Overview/Hospital Course:  5/27- Plan for [facility] tomorrow.   5/28- LHC showed 3 vessel CAD, cardiology recommends CABG eval at [facility]- patient accepted.   5/29- Awaiting bed at [facility].     Interval History: Patient seen and examined at the bedside, reports doing ok, no chest pain. Spouse updated at the bedside.     Review of Systems   All other systems reviewed and are  negative.    Objective:     Vital Signs (Most Recent):  Temp: 97.2 °F (36.2 °C) (05/29/24 1131)  Pulse: (!) 58 (05/29/24 1131)  Resp: 18 (05/29/24 1131)  BP: 129/76 (05/29/24 1131)  SpO2: (!) 94 % (05/29/24 1131) Vital Signs (24h Range):  Temp:  [97.2 °F (36.2 °C)-98.7 °F (37.1 °C)] 97.2 °F (36.2 °C)  Pulse:  [58-74] 58  Resp:  [17-18] 18  SpO2:  [91 %-95 %] 94 %  BP: (129-158)/() 129/76     Weight: 87.1 kg (192 lb 0.3 oz)  Body mass index is 30.99 kg/m².    Intake/Output Summary (Last 24 hours) at 5/29/2024 1140  Last data filed at 5/28/2024 1355  Gross per 24 hour   Intake 2578.67 ml   Output --   Net 2578.67 ml         Physical Exam  Vitals and nursing note reviewed.   Constitutional:       Appearance: Normal appearance.   HENT:      Head: Normocephalic and atraumatic.      Nose: Nose normal.      Mouth/Throat:      Mouth: Mucous membranes are moist.   Eyes:      Extraocular Movements: Extraocular movements intact.   Cardiovascular:      Rate and Rhythm: Normal rate and regular rhythm.      Pulses: Normal pulses.      Heart sounds: Normal heart sounds.   Pulmonary:      Effort: Pulmonary effort is normal.      Breath sounds: Normal breath sounds.   Abdominal:      General: Bowel sounds are normal.      Palpations: Abdomen is soft.   Musculoskeletal:         General: Normal range of motion.      Cervical back: Normal range of motion.   Skin:     General: Skin is warm.   Neurological:      General: No focal deficit present.      Mental Status: He is alert and oriented to person, place, and time. Mental status is at baseline.   Psychiatric:         Mood and Affect: Mood normal.         Behavior: Behavior normal.             Significant Labs: All pertinent labs within the past 24 hours have been reviewed.    Significant Imaging: I have reviewed all pertinent imaging results/findings within the past 24 hours.    Assessment/Plan:      * NSTEMI (non-ST elevated myocardial infarction)  Patient presented to the [facility]  ED with complaints of chest pain.  Troponin elevated.   Echo    Result Date: 5/25/2024    Left Ventricle: The left ventricle is normal in size. Mildly increased   wall thickness. There is concentric hypertrophy. Mild global hypokinesis   present. There is mildly reduced systolic function with a visually   estimated ejection fraction of 45 - 50%. Ejection fraction by visual   approximation is 55%. There is normal diastolic function.    Right Ventricle: Normal right ventricular cavity size. Systolic   function is normal.    Aortic Valve: The aortic valve is structurally normal. Mildly calcified   cusps.    Mitral Valve: There is mild bileaflet sclerosis. Mildly thickened   leaflets. There is mild regurgitation with an eccentric jet.    IVC/SVC: Normal venous pressure at 3 mmHg.    Pericardium: There is a trivial effusion. No indication of cardiac   tamponade.         EDITH score is 4  Heart Score: 8  Cardiology consulted; s/p LHC today which showed 3 vessel CAD, accepted at [facility] for CABG eval.   S/p heparin infusion, continue aspirin, statin, and beta blocker (hold Brillinta).   Monitor on tele.     Sleep apnea  Recommend CPAP at night  Patient unable to tolerate.     Cardiomyopathy  Patient is identified as having Systolic (HFrEF) heart failure that is Acute on chronic. CHF is currently controlled. Latest ECHO performed and demonstrates- Results for orders placed during the hospital encounter of 05/25/24    Echo    Interpretation Summary    Left Ventricle: The left ventricle is normal in size. Mildly increased wall thickness. There is concentric hypertrophy. Mild global hypokinesis present. There is mildly reduced systolic function with a visually estimated ejection fraction of 45 - 50%. Ejection fraction by visual approximation is 55%. There is normal diastolic function.    Right Ventricle: Normal right ventricular cavity size. Systolic function is normal.    Aortic Valve: The aortic valve is structurally normal.  "Mildly calcified cusps.    Mitral Valve: There is mild bileaflet sclerosis. Mildly thickened leaflets. There is mild regurgitation with an eccentric jet.    IVC/SVC: Normal venous pressure at 3 mmHg.    Pericardium: There is a trivial effusion. No indication of cardiac tamponade.  . Continue Beta Blocker and monitor clinical status closely. Monitor on telemetry. Patient is off CHF pathway.  Monitor strict Is&Os and daily weights.  Place on fluid restriction of 2 L. Cardiology has been consulted. Continue to stress to patient importance of self efficacy and  on diet for CHF. Last BNP reviewed- and noted below No results for input(s): "BNP", "BNPTRIAGEBLO" in the last 168 hours.   5/27 euvolemic    Depression  Patient has persistent depression which is mild and is currently uncontrolled. Will Continue anti-depressant medications. We will not consult psychiatry at this time. Patient does not display psychosis at this time. Continue to monitor closely and adjust plan of care as needed.        Obesity, diabetes, and hypertension syndrome  Body mass index is 30.99 kg/m². Morbid obesity complicates all aspects of disease management from diagnostic modalities to treatment. Weight loss encouraged and health benefits explained to patient.         Hypertension  Chronic, uncontrolled. Latest blood pressure and vitals reviewed-     Temp:  [97.2 °F (36.2 °C)-98.7 °F (37.1 °C)]   Pulse:  [58-74]   Resp:  [17-18]   BP: (129-158)/()   SpO2:  [91 %-95 %] .     Resume metoprolol and lisinopril.     While in the hospital, will manage blood pressure as follows; Continue home antihypertensive regimen    Will utilize p.r.n. blood pressure medication only if patient's blood pressure greater than 180/110 and he develops symptoms such as worsening chest pain or shortness of breath.  controlled    COPD (chronic obstructive pulmonary disease)  Patient's COPD is controlled currently.  Patient is currently off COPD Pathway. Continue " "scheduled inhalers Supplemental oxygen and monitor respiratory status closely.       Anxiety  Resume home citalopram.     Screening for malignant neoplasm of colon        Diabetes mellitus without complication  Patient's FSGs are uncontrolled due to hyperglycemia on current medication regimen.  Last A1c reviewed-   Lab Results   Component Value Date    HGBA1C 8.1 (H) 05/25/2024     Most recent fingerstick glucose reviewed- No results for input(s): "POCTGLUCOSE" in the last 24 hours.  Current correctional scale  Low  Maintain anti-hyperglycemic dose as follows-   Antihyperglycemics (From admission, onward)      Start     Stop Route Frequency Ordered    05/27/24 1645  insulin aspart U-100 injection 3 Units         -- SubQ 3 times daily with meals 05/27/24 1442    05/26/24 2100  insulin detemir U-100 injection 3 Units         -- SubQ Nightly 05/26/24 1506    05/25/24 0631  insulin aspart U-100 injection 0-5 Units         -- SubQ Every 6 hours PRN 05/25/24 0532          Hold Oral hypoglycemics while patient is in the hospital.  Adjust insulin as required.     BPH with obstruction/lower urinary tract symptoms  Resume home finasteride.        VTE Risk Mitigation (From admission, onward)           Ordered     enoxaparin injection 90 mg  Every 12 hours         05/29/24 0937     Reason for No Pharmacological VTE Prophylaxis  Once        Comments: Patient will be on heparin infusion for ACS   Question:  Reasons:  Answer:  Physician Provided (leave comment)    05/25/24 0531     IP VTE HIGH RISK PATIENT  Once         05/25/24 0531     Place sequential compression device  Until discontinued         05/25/24 0531                    Discharge Planning   ADEBAYO: 5/29/2024     Code Status: Full Code   Is the patient medically ready for discharge?:     Reason for patient still in hospital (select all that apply): Pending disposition  Discharge Plan A: Home with family                  LYN RUEDA MD  Department of Hospital " Medicine   [facility] - [address] [place] [address]

## 2024-05-29 NOTE — ASSESSMENT & PLAN NOTE
Patient presented to the [facility] ED with complaints of chest pain.  Troponin elevated.   Echo    Result Date: 5/25/2024    Left Ventricle: The left ventricle is normal in size. Mildly increased   wall thickness. There is concentric hypertrophy. Mild global hypokinesis   present. There is mildly reduced systolic function with a visually   estimated ejection fraction of 45 - 50%. Ejection fraction by visual   approximation is 55%. There is normal diastolic function.    Right Ventricle: Normal right ventricular cavity size. Systolic   function is normal.    Aortic Valve: The aortic valve is structurally normal. Mildly calcified   cusps.    Mitral Valve: There is mild bileaflet sclerosis. Mildly thickened   leaflets. There is mild regurgitation with an eccentric jet.    IVC/SVC: Normal venous pressure at 3 mmHg.    Pericardium: There is a trivial effusion. No indication of cardiac   tamponade.         EDITH score is 4  Heart Score: 8  Cardiology consulted; s/p LHC today which showed 3 vessel CAD, accepted at [facility] for CABG eval.   S/p heparin infusion, continue aspirin, statin, and beta blocker (hold Brillinta).   Monitor on tele.

## 2024-05-29 NOTE — ASSESSMENT & PLAN NOTE
NSTEMI, chest pain had resolved with medical tx, reoccurred walking in hallway today, has resolved back at rest,  recommend LHC/poss, reviewed indications, risks and benefits with pt and wife, elect to proceed in AM.     5/29/2024:  - LHC revealed 3VCAD; accepted to [facility] by Dr. Han for bypass surgery, waiting for bed availability  - Continue medical management of NSTEMI; added lovenox BID and increased lisinopril to 10mg daily and Toprol to 25mg daily  - Follow up with cardiology in 4-6 weeks; appt scheduled on 7/9/2024

## 2024-05-29 NOTE — SUBJECTIVE & OBJECTIVE
Interval History: Patient seen and examined at the bedside, reports doing ok, no chest pain. Spouse updated at the bedside.     Review of Systems   All other systems reviewed and are negative.    Objective:     Vital Signs (Most Recent):  Temp: 97.2 °F (36.2 °C) (05/29/24 1131)  Pulse: (!) 58 (05/29/24 1131)  Resp: 18 (05/29/24 1131)  BP: 129/76 (05/29/24 1131)  SpO2: (!) 94 % (05/29/24 1131) Vital Signs (24h Range):  Temp:  [97.2 °F (36.2 °C)-98.7 °F (37.1 °C)] 97.2 °F (36.2 °C)  Pulse:  [58-74] 58  Resp:  [17-18] 18  SpO2:  [91 %-95 %] 94 %  BP: (129-158)/() 129/76     Weight: 87.1 kg (192 lb 0.3 oz)  Body mass index is 30.99 kg/m².    Intake/Output Summary (Last 24 hours) at 5/29/2024 1140  Last data filed at 5/28/2024 1355  Gross per 24 hour   Intake 2578.67 ml   Output --   Net 2578.67 ml         Physical Exam  Vitals and nursing note reviewed.   Constitutional:       Appearance: Normal appearance.   HENT:      Head: Normocephalic and atraumatic.      Nose: Nose normal.      Mouth/Throat:      Mouth: Mucous membranes are moist.   Eyes:      Extraocular Movements: Extraocular movements intact.   Cardiovascular:      Rate and Rhythm: Normal rate and regular rhythm.      Pulses: Normal pulses.      Heart sounds: Normal heart sounds.   Pulmonary:      Effort: Pulmonary effort is normal.      Breath sounds: Normal breath sounds.   Abdominal:      General: Bowel sounds are normal.      Palpations: Abdomen is soft.   Musculoskeletal:         General: Normal range of motion.      Cervical back: Normal range of motion.   Skin:     General: Skin is warm.   Neurological:      General: No focal deficit present.      Mental Status: He is alert and oriented to person, place, and time. Mental status is at baseline.   Psychiatric:         Mood and Affect: Mood normal.         Behavior: Behavior normal.             Significant Labs: All pertinent labs within the past 24 hours have been reviewed.    Significant Imaging: I  have reviewed all pertinent imaging results/findings within the past 24 hours.

## 2024-05-29 NOTE — SUBJECTIVE & OBJECTIVE
"Interval History: Patient seen today s/p [facility] yesterday which revealed 3VCAD. He has been accepted to [facility] by Dr. Han, waiting for available bed. Denies chest pain or sob.    Review of Systems   Cardiovascular:  Negative for chest pain.   Respiratory:  Negative for shortness of breath.      Objective:     Vital Signs (Most Recent):  Temp: 97.5 °F (36.4 °C) (05/29/24 1639)  Pulse: 60 (05/29/24 1639)  Resp: 18 (05/29/24 1639)  BP: 129/81 (05/29/24 1639)  SpO2: 95 % (05/29/24 1639) Vital Signs (24h Range):  Temp:  [97.2 °F (36.2 °C)-98.7 °F (37.1 °C)] 97.5 °F (36.4 °C)  Pulse:  [58-74] 60  Resp:  [17-18] 18  SpO2:  [92 %-95 %] 95 %  BP: (129-157)/() 129/81     Weight: 87.1 kg (192 lb 0.3 oz)  Body mass index is 30.99 kg/m².     SpO2: 95 %       No intake or output data in the 24 hours ending 05/29/24 1737    Lines/Drains/Airways       Peripheral Intravenous Line  Duration                  Peripheral IV - Single Lumen 05/25/24 0310 20 G Anterior;Distal;Left Upper Arm 4 days                       Physical Exam  Vitals reviewed.   Constitutional:       General: He is not in acute distress.  Cardiovascular:      Rate and Rhythm: Normal rate and regular rhythm.   Pulmonary:      Effort: Pulmonary effort is normal.      Breath sounds: Normal breath sounds.   Abdominal:      General: Bowel sounds are normal.      Palpations: Abdomen is soft.   Skin:     General: Skin is warm and dry.   Neurological:      Mental Status: He is alert and oriented to person, place, and time.            Significant Labs: ABG: No results for input(s): "PH", "PCO2", "HCO3", "POCSATURATED", "BE" in the last 48 hours., Blood Culture: No results for input(s): "LABBLOO" in the last 48 hours., BMP:   Recent Labs   Lab 05/29/24  0510   *      K 3.8   *   CO2 22   BUN 13   CREATININE 0.82   CALCIUM 9.2   , CMP   Recent Labs   Lab 05/29/24  0510      K 3.8   *   CO2 22   *   BUN 13   CREATININE 0.82   CALCIUM 9.2 " "  ANIONGAP 12   , CBC No results for input(s): "WBC", "HGB", "HCT", "PLT" in the last 48 hours., Lipid Panel No results for input(s): "CHOL", "HDL", "LDLCALC", "TRIG", "CHOLHDL" in the last 48 hours., and Troponin No results for input(s): "TROPONINI" in the last 48 hours.    Significant Imaging: Cardiac Cath: Results for orders placed during the hospital encounter of 05/25/24    Cardiac catheterization    Conclusion    Discussed with patient who was amenable to CABG - discussed w/ CTS @ [facility] - Dr. Ky Han accepted patient. Plan direct hospital to hospital transfer.    The RPAV lesion was 90% stenosed.    The RPDA lesion was 90% stenosed.    The Mid RCA to Dist RCA lesion was 70% stenosed.    The 1st Mrg lesion was 90% stenosed.    The Ramus lesion was 80% stenosed.    The Mid LAD lesion was 80% stenosed.    The pre-procedure left ventricular end diastolic pressure was 11.    The estimated blood loss was none.    There was three vessel coronary artery disease.    The procedure log was documented by No documenter listed and verified by Avery Cooper MD.    Date: 5/28/2024  Time: 2:01 PM     , Echocardiogram: Transthoracic echo (TTE) complete (Cupid Only):   Results for orders placed or performed during the hospital encounter of 05/25/24   Echo   Result Value Ref Range    BSA 2.01 m2    LVOT stroke volume 50.36 cm3    LVIDd 5.00 3.5 - 6.0 cm    LV Systolic Volume 92.56 mL    LV Systolic Volume Index 47.0 mL/m2    LVIDs 4.50 (A) 2.1 - 4.0 cm    LV Diastolic Volume 118.51 mL    LV Diastolic Volume Index 60.16 mL/m2    IVS 1.05 0.6 - 1.1 cm    LVOT diameter 1.99 cm    LVOT area 3.1 cm2    FS 10 (A) 28 - 44 %    Left Ventricle Relative Wall Thickness 0.49 cm    Posterior Wall 1.23 (A) 0.6 - 1.1 cm    LV mass 217.61 g    LV Mass Index 110 g/m2    MV Peak E Bassam 0.62 m/s    TDI LATERAL 0.07 m/s    TDI SEPTAL 0.06 m/s    E/E' ratio 9.54 m/s    MV Peak A Bassam 0.95 m/s    E/A ratio 0.65     E wave deceleration time 206.72 " msec    LV SEPTAL E/E' RATIO 10.33 m/s    LV LATERAL E/E' RATIO 8.86 m/s    LVOT peak priscila 0.71 m/s    Left Ventricular Outflow Tract Mean Velocity 0.50 cm/s    Left Ventricular Outflow Tract Mean Gradient 1.14 mmHg    RVDD 3.18 cm    TAPSE 1.69 cm    RV/LV Ratio 0.64 cm    LA size 3.05 cm    Left Atrium Major Axis 3.68 cm    RA Major Axis 3.84 cm    AV mean gradient 3 mmHg    AV peak gradient 4 mmHg    Ao peak priscila 1.00 m/s    Ao VTI 21.30 cm    LVOT peak VTI 16.20 cm    AV valve area 2.36 cm²    AV Velocity Ratio 0.71     AV index (prosthetic) 0.76     TEGAN by Velocity Ratio 2.21 cm²    PV PEAK VELOCITY 0.64 m/s    PV peak gradient 2 mmHg    Ao root annulus 2.77 cm    IVC diameter 1.64 cm    Mean e' 0.07 m/s    ZLVIDS 2.03     ZLVIDD -1.24     AORTIC VALVE CUSP SEPERATION 2.16 cm    EF 55 %    Est. RA pres 3 mmHg    Narrative      Left Ventricle: The left ventricle is normal in size. Mildly increased   wall thickness. There is concentric hypertrophy. Mild global hypokinesis   present. There is mildly reduced systolic function with a visually   estimated ejection fraction of 45 - 50%. Ejection fraction by visual   approximation is 55%. There is normal diastolic function.    Right Ventricle: Normal right ventricular cavity size. Systolic   function is normal.    Aortic Valve: The aortic valve is structurally normal. Mildly calcified   cusps.    Mitral Valve: There is mild bileaflet sclerosis. Mildly thickened   leaflets. There is mild regurgitation with an eccentric jet.    IVC/SVC: Normal venous pressure at 3 mmHg.    Pericardium: There is a trivial effusion. No indication of cardiac   tamponade.     , and X-Ray: CXR: X-Ray Chest 1 View (CXR):   X-Ray Chest AP Portable  Order: [id number]  Status: Final result       Visible to patient: Yes (not seen)       Next appt: 06/05/2024 at 02:00 PM in [facility] (CELESTE Dove,PMHNP)       Dx: Chest pain    0 Result Notes  Details    Reading Physician Reading Date  Result Priority   Nehemiah Ford MD  [phone] 5/25/2024 STAT     Narrative & Impression  EXAMINATION:  XR CHEST AP PORTABLE     CLINICAL HISTORY:  Chest pain, unspecified     TECHNIQUE:  Single frontal view of the chest was performed.     COMPARISON:  3/2/2020     FINDINGS:  Heart size normal. Lungs clear. No pneumothorax or pleural effusion.     Impression:     No acute findings.        Electronically signed by:Nehemiah Ford  Date:                                            05/25/2024  Time:                                           09:27

## 2024-05-29 NOTE — PLAN OF CARE
05/29/24 @ 0827 - HEIKE sent secure chat to MD. Pt being transferred to [facility] for CABG. D/C packet complete and taken to unit. Transfer forms ([id number] & [id number]). CM will continue to follow.

## 2024-05-29 NOTE — PROGRESS NOTES
[facility] - [facility] Medical Telemetry  Cardiology  Progress Note    Patient Name: Richard Soliz  MRN: [id number]  Admission Date: 5/25/2024  Hospital Length of Stay: 4 days  Code Status: Full Code   Attending Physician: Tree Greer MD   Primary Care Physician: Valentina Mcintyre FNP  Expected Discharge Date: 5/29/2024  Principal Problem:NSTEMI (non-ST elevated myocardial infarction)    Subjective:     Hospital Course:   Pt admitted, started on optimal tx, chest pain reoccured while walking in hallway, lasted two to three minutes, resolved with sitting back down.     Interval History: Patient seen today s/p [facility] yesterday which revealed 3VCAD. He has been accepted to [facility] by Dr. Han, waiting for available bed. Denies chest pain or sob.    Review of Systems   Cardiovascular:  Negative for chest pain.   Respiratory:  Negative for shortness of breath.      Objective:     Vital Signs (Most Recent):  Temp: 97.5 °F (36.4 °C) (05/29/24 1639)  Pulse: 60 (05/29/24 1639)  Resp: 18 (05/29/24 1639)  BP: 129/81 (05/29/24 1639)  SpO2: 95 % (05/29/24 1639) Vital Signs (24h Range):  Temp:  [97.2 °F (36.2 °C)-98.7 °F (37.1 °C)] 97.5 °F (36.4 °C)  Pulse:  [58-74] 60  Resp:  [17-18] 18  SpO2:  [92 %-95 %] 95 %  BP: (129-157)/() 129/81     Weight: 87.1 kg (192 lb 0.3 oz)  Body mass index is 30.99 kg/m².     SpO2: 95 %       No intake or output data in the 24 hours ending 05/29/24 1737    Lines/Drains/Airways       Peripheral Intravenous Line  Duration                  Peripheral IV - Single Lumen 05/25/24 0310 20 G Anterior;Distal;Left Upper Arm 4 days                       Physical Exam  Vitals reviewed.   Constitutional:       General: He is not in acute distress.  Cardiovascular:      Rate and Rhythm: Normal rate and regular rhythm.   Pulmonary:      Effort: Pulmonary effort is normal.      Breath sounds: Normal breath sounds.   Abdominal:      General: Bowel sounds are normal.      Palpations: Abdomen is  "soft.   Skin:     General: Skin is warm and dry.   Neurological:      Mental Status: He is alert and oriented to person, place, and time.            Significant Labs: ABG: No results for input(s): "PH", "PCO2", "HCO3", "POCSATURATED", "BE" in the last 48 hours., Blood Culture: No results for input(s): "LABBLOO" in the last 48 hours., BMP:   Recent Labs   Lab 05/29/24  0510   *      K 3.8   *   CO2 22   BUN 13   CREATININE 0.82   CALCIUM 9.2   , CMP   Recent Labs   Lab 05/29/24  0510      K 3.8   *   CO2 22   *   BUN 13   CREATININE 0.82   CALCIUM 9.2   ANIONGAP 12   , CBC No results for input(s): "WBC", "HGB", "HCT", "PLT" in the last 48 hours., Lipid Panel No results for input(s): "CHOL", "HDL", "LDLCALC", "TRIG", "CHOLHDL" in the last 48 hours., and Troponin No results for input(s): "TROPONINI" in the last 48 hours.    Significant Imaging: Cardiac Cath: Results for orders placed during the hospital encounter of 05/25/24    Cardiac catheterization    Conclusion    Discussed with patient who was amenable to CABG - discussed w/ CTS @ [facility] - Dr. Ky Han accepted patient. Plan direct hospital to hospital transfer.    The RPAV lesion was 90% stenosed.    The RPDA lesion was 90% stenosed.    The Mid RCA to Dist RCA lesion was 70% stenosed.    The 1st Mrg lesion was 90% stenosed.    The Ramus lesion was 80% stenosed.    The Mid LAD lesion was 80% stenosed.    The pre-procedure left ventricular end diastolic pressure was 11.    The estimated blood loss was none.    There was three vessel coronary artery disease.    The procedure log was documented by No documenter listed and verified by Avery Cooper MD.    Date: 5/28/2024  Time: 2:01 PM     , Echocardiogram: Transthoracic echo (TTE) complete (Cupid Only):   Results for orders placed or performed during the hospital encounter of 05/25/24   Echo   Result Value Ref Range    BSA 2.01 m2    LVOT stroke volume 50.36 cm3    LVIDd 5.00 " 3.5 - 6.0 cm    LV Systolic Volume 92.56 mL    LV Systolic Volume Index 47.0 mL/m2    LVIDs 4.50 (A) 2.1 - 4.0 cm    LV Diastolic Volume 118.51 mL    LV Diastolic Volume Index 60.16 mL/m2    IVS 1.05 0.6 - 1.1 cm    LVOT diameter 1.99 cm    LVOT area 3.1 cm2    FS 10 (A) 28 - 44 %    Left Ventricle Relative Wall Thickness 0.49 cm    Posterior Wall 1.23 (A) 0.6 - 1.1 cm    LV mass 217.61 g    LV Mass Index 110 g/m2    MV Peak E Bassam 0.62 m/s    TDI LATERAL 0.07 m/s    TDI SEPTAL 0.06 m/s    E/E' ratio 9.54 m/s    MV Peak A Bassam 0.95 m/s    E/A ratio 0.65     E wave deceleration time 206.72 msec    LV SEPTAL E/E' RATIO 10.33 m/s    LV LATERAL E/E' RATIO 8.86 m/s    LVOT peak bassam 0.71 m/s    Left Ventricular Outflow Tract Mean Velocity 0.50 cm/s    Left Ventricular Outflow Tract Mean Gradient 1.14 mmHg    RVDD 3.18 cm    TAPSE 1.69 cm    RV/LV Ratio 0.64 cm    LA size 3.05 cm    Left Atrium Major Axis 3.68 cm    RA Major Axis 3.84 cm    AV mean gradient 3 mmHg    AV peak gradient 4 mmHg    Ao peak bassam 1.00 m/s    Ao VTI 21.30 cm    LVOT peak VTI 16.20 cm    AV valve area 2.36 cm²    AV Velocity Ratio 0.71     AV index (prosthetic) 0.76     TEGAN by Velocity Ratio 2.21 cm²    PV PEAK VELOCITY 0.64 m/s    PV peak gradient 2 mmHg    Ao root annulus 2.77 cm    IVC diameter 1.64 cm    Mean e' 0.07 m/s    ZLVIDS 2.03     ZLVIDD -1.24     AORTIC VALVE CUSP SEPERATION 2.16 cm    EF 55 %    Est. RA pres 3 mmHg    Narrative      Left Ventricle: The left ventricle is normal in size. Mildly increased   wall thickness. There is concentric hypertrophy. Mild global hypokinesis   present. There is mildly reduced systolic function with a visually   estimated ejection fraction of 45 - 50%. Ejection fraction by visual   approximation is 55%. There is normal diastolic function.    Right Ventricle: Normal right ventricular cavity size. Systolic   function is normal.    Aortic Valve: The aortic valve is structurally normal. Mildly calcified    cusps.    Mitral Valve: There is mild bileaflet sclerosis. Mildly thickened   leaflets. There is mild regurgitation with an eccentric jet.    IVC/SVC: Normal venous pressure at 3 mmHg.    Pericardium: There is a trivial effusion. No indication of cardiac   tamponade.     , and X-Ray: CXR: X-Ray Chest 1 View (CXR):   X-Ray Chest AP Portable  Order: [id number]  Status: Final result       Visible to patient: Yes (not seen)       Next appt: 06/05/2024 at 02:00 PM in [facility] (Rasheed Del Toro, JOHNATHONP,PMHNP)       Dx: Chest pain    0 Result Notes  Details    Reading Physician Reading Date Result Priority   Nehemiah Ford MD  [phone] 5/25/2024 STAT     Narrative & Impression  EXAMINATION:  XR CHEST AP PORTABLE     CLINICAL HISTORY:  Chest pain, unspecified     TECHNIQUE:  Single frontal view of the chest was performed.     COMPARISON:  3/2/2020     FINDINGS:  Heart size normal. Lungs clear. No pneumothorax or pleural effusion.     Impression:     No acute findings.        Electronically signed by:Nehemiah Ford  Date:                                            05/25/2024  Time:                                           09:27     Assessment and Plan:     Brief HPI:   CC: chest pain     HPI: Pt reports chest pain, starting approximately three days ago, mid sternal, initially a sensation of tightness, progressing to a squeezing sensation, 7/10 at most severe, radiates into right arm and right side of his neck, lasting up to thirty minutes.  He reports decreasing exercise tolerance due to fatigue over last several weeks, wife at bedside reports fatigue and shortness of breath have been present x several months.       * NSTEMI (non-ST elevated myocardial infarction)  NSTEMI, chest pain had resolved with medical tx, reoccurred walking in hallway today, has resolved back at rest,  recommend LHC/poss, reviewed indications, risks and benefits with pt and wife, elect to proceed in AM.     5/29/2024:  - LHC revealed 3VCAD;  "accepted to [facility] by Dr. Han for bypass surgery, waiting for bed availability  - Continue medical management of NSTEMI; added lovenox BID and increased lisinopril to 10mg daily and Toprol to 25mg daily  - Follow up with cardiology in 4-6 weeks; appt scheduled on 7/9/2024    Hypertension  Chronic, controlled. Latest blood pressure and vitals reviewed-     Temp:  [96.1 °F (35.6 °C)-97.9 °F (36.6 °C)]   Pulse:  [56-68]   Resp:  [16-18]   BP: (114-163)/(59-99)   SpO2:  [93 %-98 %] .   Home meds for hypertension were reviewed and noted below.       While in the hospital, will manage blood pressure as follows; Continue home antihypertensive regimen    Will utilize p.r.n. blood pressure medication only if patient's blood pressure greater than 180/110 and he develops symptoms such as worsening chest pain or shortness of breath.    Anxiety  Controlled on current medications.   Following with psychiatry, well controlled.       Diabetes mellitus without complication  Patient's FSGs are uncontrolled due to hyperglycemia on current medication regimen.  Last A1c reviewed-   Lab Results   Component Value Date    HGBA1C 8.1 (H) 05/25/2024     Most recent fingerstick glucose reviewed- No results for input(s): "POCTGLUCOSE" in the last 24 hours.  Current correctional scale    Maintain anti-hyperglycemic dose as follows-   Antihyperglycemics (From admission, onward)      Start     Stop Route Frequency Ordered    05/25/24 0631  insulin aspart U-100 injection 0-5 Units         -- SubQ Every 6 hours PRN 05/25/24 0532          Hold Oral hypoglycemics while patient is in the hospital.        VTE Risk Mitigation (From admission, onward)           Ordered     enoxaparin injection 90 mg  Every 12 hours         05/29/24 0937     Reason for No Pharmacological VTE Prophylaxis  Once        Comments: Patient will be on heparin infusion for ACS   Question:  Reasons:  Answer:  Physician Provided (leave comment)    05/25/24 0531     IP VTE HIGH RISK " PATIENT  Once         05/25/24 0531     Place sequential compression device  Until discontinued         05/25/24 0531                    CELESTE Monroy  Cardiology  [facility] - [address] [place] [address]

## 2024-05-29 NOTE — ASSESSMENT & PLAN NOTE
Chronic, uncontrolled. Latest blood pressure and vitals reviewed-     Temp:  [97.2 °F (36.2 °C)-98.7 °F (37.1 °C)]   Pulse:  [58-74]   Resp:  [17-18]   BP: (129-158)/()   SpO2:  [91 %-95 %] .     Resume metoprolol and lisinopril.     While in the hospital, will manage blood pressure as follows; Continue home antihypertensive regimen    Will utilize p.r.n. blood pressure medication only if patient's blood pressure greater than 180/110 and he develops symptoms such as worsening chest pain or shortness of breath.  controlled

## 2024-05-30 VITALS
WEIGHT: 192 LBS | OXYGEN SATURATION: 95 % | DIASTOLIC BLOOD PRESSURE: 71 MMHG | HEIGHT: 66 IN | TEMPERATURE: 98 F | HEART RATE: 60 BPM | RESPIRATION RATE: 18 BRPM | SYSTOLIC BLOOD PRESSURE: 112 MMHG | BODY MASS INDEX: 30.86 KG/M2

## 2024-05-30 LAB
ANION GAP SERPL CALCULATED.3IONS-SCNC: 9 MMOL/L (ref 7–16)
BASOPHILS # BLD AUTO: 0.1 K/UL (ref 0–0.2)
BASOPHILS NFR BLD AUTO: 1 % (ref 0–1)
BUN SERPL-MCNC: 15 MG/DL (ref 7–18)
BUN/CREAT SERPL: 14 (ref 6–20)
CALCIUM SERPL-MCNC: 9 MG/DL (ref 8.5–10.1)
CHLORIDE SERPL-SCNC: 108 MMOL/L (ref 98–107)
CO2 SERPL-SCNC: 25 MMOL/L (ref 21–32)
CREAT SERPL-MCNC: 1.05 MG/DL (ref 0.7–1.3)
DIFFERENTIAL METHOD BLD: ABNORMAL
EGFR (NO RACE VARIABLE) (RUSH/TITUS): 78 ML/MIN/1.73M2
EOSINOPHIL # BLD AUTO: 0.35 K/UL (ref 0–0.5)
EOSINOPHIL NFR BLD AUTO: 3.7 % (ref 1–4)
ERYTHROCYTE [DISTWIDTH] IN BLOOD BY AUTOMATED COUNT: 12.5 % (ref 11.5–14.5)
GLUCOSE SERPL-MCNC: 188 MG/DL (ref 70–105)
GLUCOSE SERPL-MCNC: 203 MG/DL (ref 74–106)
GLUCOSE SERPL-MCNC: 263 MG/DL (ref 70–105)
GLUCOSE SERPL-MCNC: 264 MG/DL (ref 70–105)
HCT VFR BLD AUTO: 44.1 % (ref 40–54)
HGB BLD-MCNC: 14.6 G/DL (ref 13.5–18)
IMM GRANULOCYTES # BLD AUTO: 0.04 K/UL (ref 0–0.04)
IMM GRANULOCYTES NFR BLD: 0.4 % (ref 0–0.4)
LYMPHOCYTES # BLD AUTO: 2.35 K/UL (ref 1–4.8)
LYMPHOCYTES NFR BLD AUTO: 24.6 % (ref 27–41)
MCH RBC QN AUTO: 30.7 PG (ref 27–31)
MCHC RBC AUTO-ENTMCNC: 33.1 G/DL (ref 32–36)
MCV RBC AUTO: 92.6 FL (ref 80–96)
MONOCYTES # BLD AUTO: 0.95 K/UL (ref 0–0.8)
MONOCYTES NFR BLD AUTO: 9.9 % (ref 2–6)
MPC BLD CALC-MCNC: 8.8 FL (ref 9.4–12.4)
NEUTROPHILS # BLD AUTO: 5.77 K/UL (ref 1.8–7.7)
NEUTROPHILS NFR BLD AUTO: 60.4 % (ref 53–65)
NRBC # BLD AUTO: 0 X10E3/UL
NRBC, AUTO (.00): 0 %
PLATELET # BLD AUTO: 224 K/UL (ref 150–400)
POTASSIUM SERPL-SCNC: 4.2 MMOL/L (ref 3.5–5.1)
RBC # BLD AUTO: 4.76 M/UL (ref 4.6–6.2)
SODIUM SERPL-SCNC: 138 MMOL/L (ref 136–145)
WBC # BLD AUTO: 9.56 K/UL (ref 4.5–11)

## 2024-05-30 PROCEDURE — 63600175 PHARM REV CODE 636 W HCPCS: Performed by: STUDENT IN AN ORGANIZED HEALTH CARE EDUCATION/TRAINING PROGRAM

## 2024-05-30 PROCEDURE — 80048 BASIC METABOLIC PNL TOTAL CA: CPT | Performed by: INTERNAL MEDICINE

## 2024-05-30 PROCEDURE — 85025 COMPLETE CBC W/AUTO DIFF WBC: CPT | Performed by: INTERNAL MEDICINE

## 2024-05-30 PROCEDURE — 63600175 PHARM REV CODE 636 W HCPCS: Performed by: NURSE PRACTITIONER

## 2024-05-30 PROCEDURE — 36415 COLL VENOUS BLD VENIPUNCTURE: CPT | Performed by: INTERNAL MEDICINE

## 2024-05-30 PROCEDURE — 99232 SBSQ HOSP IP/OBS MODERATE 35: CPT | Mod: ,,, | Performed by: INTERNAL MEDICINE

## 2024-05-30 PROCEDURE — 25000003 PHARM REV CODE 250: Performed by: NURSE PRACTITIONER

## 2024-05-30 PROCEDURE — 82962 GLUCOSE BLOOD TEST: CPT

## 2024-05-30 PROCEDURE — 25000003 PHARM REV CODE 250: Performed by: INTERNAL MEDICINE

## 2024-05-30 PROCEDURE — 94761 N-INVAS EAR/PLS OXIMETRY MLT: CPT

## 2024-05-30 RX ADMIN — INSULIN ASPART 3 UNITS: 100 INJECTION, SOLUTION INTRAVENOUS; SUBCUTANEOUS at 11:05

## 2024-05-30 RX ADMIN — METOPROLOL TARTRATE 25 MG: 25 TABLET, FILM COATED ORAL at 10:05

## 2024-05-30 RX ADMIN — FINASTERIDE 5 MG: 5 TABLET, FILM COATED ORAL at 10:05

## 2024-05-30 RX ADMIN — ENOXAPARIN SODIUM 90 MG: 100 INJECTION SUBCUTANEOUS at 10:05

## 2024-05-30 RX ADMIN — ATORVASTATIN CALCIUM 80 MG: 80 TABLET, FILM COATED ORAL at 10:05

## 2024-05-30 RX ADMIN — LISINOPRIL 10 MG: 10 TABLET ORAL at 10:05

## 2024-05-30 RX ADMIN — MONTELUKAST 10 MG: 10 TABLET, FILM COATED ORAL at 10:05

## 2024-05-30 RX ADMIN — INSULIN ASPART 3 UNITS: 100 INJECTION, SOLUTION INTRAVENOUS; SUBCUTANEOUS at 04:05

## 2024-05-30 RX ADMIN — CETIRIZINE HYDROCHLORIDE 10 MG: 10 TABLET, FILM COATED ORAL at 10:05

## 2024-05-30 RX ADMIN — ASPIRIN 81 MG: 81 TABLET, COATED ORAL at 10:05

## 2024-05-30 RX ADMIN — PANTOPRAZOLE SODIUM 40 MG: 40 TABLET, DELAYED RELEASE ORAL at 10:05

## 2024-05-30 RX ADMIN — CITALOPRAM HYDROBROMIDE 40 MG: 20 TABLET, FILM COATED ORAL at 10:05

## 2024-05-30 NOTE — PROGRESS NOTES
[facility] - [address] [place] [address] Medicine  Progress Note    Patient Name: Richard Soliz  MRN: [id number]  Patient Class: IP- Inpatient   Admission Date: 5/25/2024  Length of Stay: 5 days  Attending Physician: Tree Greer MD  Primary Care Provider: Valentina Mcintyre FNP        Subjective:     Principal Problem:NSTEMI (non-ST elevated myocardial infarction)        HPI:  Patient is a 67 yo male with PMH of DM, BPH, anxiety, and depression. He presented to the [facility] with the complaint of chest pain that started about 2 days ago. He stated that it started off as chest tightness located in the mid of his chest. He graded it being 6-7/10. The pain was episodic with a single episode lasting about 30 minutes. The pain was non radiating. The pain was relieved by Mylanta. Patient denied accompanying SOB, palpitations, leg swelling, N/V, dizziness/lightheadedness, or syncope.  Tonight, he was woken up by the same symptoms but with increased intensity. This time pain radiated to his right arm and neck.  Patient reported orthopnea and excessive tiredness for past few months.  Patient doesn't see a cardiologist. He has family history of cardiac diseases.  Patient lives with wife. He is ambulatory and independent in ADLs.  ED course:  P        BP         Temp          pO2  75 141/75     afebrile   94 %  1st troponin: 1535.9  EKG: Abnormal EKG  CBC  10.90 15.8 222    49.2         BMP  139 104 15 286   4.7 27 1.35       Patient is being admitted to Hospital Medicine Service under care for further evaluation and management.            Overview/Hospital Course:  5/27- Plan for [facility] tomorrow.   5/28- C showed 3 vessel CAD, cardiology recommends CABG eval at [facility]- patient accepted.   5/29- Awaiting bed at [facility].   5/30- Still awaiting bed at [facility].     Interval History: Patient seen and examined at the bedside, reports doing ok, no chest pain. Spouse updated at the bedside.     Review of Systems   All other  systems reviewed and are negative.    Objective:     Vital Signs (Most Recent):  Temp: 97.8 °F (36.6 °C) (05/30/24 1140)  Pulse: (!) 56 (05/30/24 1140)  Resp: 18 (05/30/24 1140)  BP: (!) 99/58 (05/30/24 1140)  SpO2: (!) 94 % (05/30/24 1140) Vital Signs (24h Range):  Temp:  [97.5 °F (36.4 °C)-98 °F (36.7 °C)] 97.8 °F (36.6 °C)  Pulse:  [56-61] 56  Resp:  [16-18] 18  SpO2:  [92 %-95 %] 94 %  BP: ()/(58-89) 99/58     Weight: 87.1 kg (192 lb 0.3 oz)  Body mass index is 30.99 kg/m².  No intake or output data in the 24 hours ending 05/30/24 1349        Physical Exam  Vitals and nursing note reviewed.   Constitutional:       Appearance: Normal appearance.   HENT:      Head: Normocephalic and atraumatic.      Nose: Nose normal.      Mouth/Throat:      Mouth: Mucous membranes are moist.   Eyes:      Extraocular Movements: Extraocular movements intact.   Cardiovascular:      Rate and Rhythm: Normal rate and regular rhythm.      Pulses: Normal pulses.      Heart sounds: Normal heart sounds.   Pulmonary:      Effort: Pulmonary effort is normal.      Breath sounds: Normal breath sounds.   Abdominal:      General: Bowel sounds are normal.      Palpations: Abdomen is soft.   Musculoskeletal:         General: Normal range of motion.      Cervical back: Normal range of motion.   Skin:     General: Skin is warm.   Neurological:      General: No focal deficit present.      Mental Status: He is alert and oriented to person, place, and time. Mental status is at baseline.   Psychiatric:         Mood and Affect: Mood normal.         Behavior: Behavior normal.             Significant Labs: All pertinent labs within the past 24 hours have been reviewed.    Significant Imaging: I have reviewed all pertinent imaging results/findings within the past 24 hours.    Assessment/Plan:      * NSTEMI (non-ST elevated myocardial infarction)  Patient presented to the [facility] ED with complaints of chest pain.  Troponin elevated.   Echo    Result Date:  5/25/2024    Left Ventricle: The left ventricle is normal in size. Mildly increased   wall thickness. There is concentric hypertrophy. Mild global hypokinesis   present. There is mildly reduced systolic function with a visually   estimated ejection fraction of 45 - 50%. Ejection fraction by visual   approximation is 55%. There is normal diastolic function.    Right Ventricle: Normal right ventricular cavity size. Systolic   function is normal.    Aortic Valve: The aortic valve is structurally normal. Mildly calcified   cusps.    Mitral Valve: There is mild bileaflet sclerosis. Mildly thickened   leaflets. There is mild regurgitation with an eccentric jet.    IVC/SVC: Normal venous pressure at 3 mmHg.    Pericardium: There is a trivial effusion. No indication of cardiac   tamponade.         EDITH score is 4  Heart Score: 8  Cardiology consulted; s/p LHC today which showed 3 vessel CAD, accepted at [facility] for CABG eval.   S/p heparin infusion and now on BID Lovenox, continue aspirin, statin, and beta blocker (hold Brillinta).   Monitor on tele.     Sleep apnea  Recommend CPAP at night  Patient unable to tolerate.     Cardiomyopathy  Patient is identified as having Systolic (HFrEF) heart failure that is Acute on chronic. CHF is currently controlled. Latest ECHO performed and demonstrates- Results for orders placed during the hospital encounter of 05/25/24    Echo    Interpretation Summary    Left Ventricle: The left ventricle is normal in size. Mildly increased wall thickness. There is concentric hypertrophy. Mild global hypokinesis present. There is mildly reduced systolic function with a visually estimated ejection fraction of 45 - 50%. Ejection fraction by visual approximation is 55%. There is normal diastolic function.    Right Ventricle: Normal right ventricular cavity size. Systolic function is normal.    Aortic Valve: The aortic valve is structurally normal. Mildly calcified cusps.    Mitral Valve: There is mild  "bileaflet sclerosis. Mildly thickened leaflets. There is mild regurgitation with an eccentric jet.    IVC/SVC: Normal venous pressure at 3 mmHg.    Pericardium: There is a trivial effusion. No indication of cardiac tamponade.  . Continue Beta Blocker and monitor clinical status closely. Monitor on telemetry. Patient is off CHF pathway.  Monitor strict Is&Os and daily weights.  Place on fluid restriction of 2 L. Cardiology has been consulted. Continue to stress to patient importance of self efficacy and  on diet for CHF. Last BNP reviewed- and noted below No results for input(s): "BNP", "BNPTRIAGEBLO" in the last 168 hours.   5/27 euvolemic    Depression  Patient has persistent depression which is mild and is currently uncontrolled. Will Continue anti-depressant medications. We will not consult psychiatry at this time. Patient does not display psychosis at this time. Continue to monitor closely and adjust plan of care as needed.        Obesity, diabetes, and hypertension syndrome  Body mass index is 30.99 kg/m². Morbid obesity complicates all aspects of disease management from diagnostic modalities to treatment. Weight loss encouraged and health benefits explained to patient.         Hypertension  Chronic, uncontrolled. Latest blood pressure and vitals reviewed-     Temp:  [97.5 °F (36.4 °C)-98 °F (36.7 °C)]   Pulse:  [56-61]   Resp:  [16-18]   BP: ()/(58-89)   SpO2:  [92 %-95 %] .     Resume metoprolol and lisinopril.     While in the hospital, will manage blood pressure as follows; Continue home antihypertensive regimen    Will utilize p.r.n. blood pressure medication only if patient's blood pressure greater than 180/110 and he develops symptoms such as worsening chest pain or shortness of breath.  controlled    COPD (chronic obstructive pulmonary disease)  Patient's COPD is controlled currently.  Patient is currently off COPD Pathway. Continue scheduled inhalers Supplemental oxygen and monitor " "respiratory status closely.       Anxiety  Resume home citalopram.     Screening for malignant neoplasm of colon        Diabetes mellitus without complication  Patient's FSGs are uncontrolled due to hyperglycemia on current medication regimen.  Last A1c reviewed-   Lab Results   Component Value Date    HGBA1C 8.1 (H) 05/25/2024     Most recent fingerstick glucose reviewed- No results for input(s): "POCTGLUCOSE" in the last 24 hours.  Current correctional scale  Low  Maintain anti-hyperglycemic dose as follows-   Antihyperglycemics (From admission, onward)      Start     Stop Route Frequency Ordered    05/27/24 1645  insulin aspart U-100 injection 3 Units         -- SubQ 3 times daily with meals 05/27/24 1442    05/26/24 2100  insulin detemir U-100 injection 3 Units         -- SubQ Nightly 05/26/24 1506    05/25/24 0631  insulin aspart U-100 injection 0-5 Units         -- SubQ Every 6 hours PRN 05/25/24 0532          Hold Oral hypoglycemics while patient is in the hospital.  Adjust insulin as required.     BPH with obstruction/lower urinary tract symptoms  Resume home finasteride.        VTE Risk Mitigation (From admission, onward)           Ordered     enoxaparin injection 90 mg  Every 12 hours         05/29/24 0937     Reason for No Pharmacological VTE Prophylaxis  Once        Comments: Patient will be on heparin infusion for ACS   Question:  Reasons:  Answer:  Physician Provided (leave comment)    05/25/24 0531     IP VTE HIGH RISK PATIENT  Once         05/25/24 0531     Place sequential compression device  Until discontinued         05/25/24 0531                    Discharge Planning   ADEBAYO: 5/30/2024     Code Status: Full Code   Is the patient medically ready for discharge?:     Reason for patient still in hospital (select all that apply): Pending disposition  Discharge Plan A: Home with family                  LYN RUEDA MD  Department of Hospital Medicine   [facility] - [facility] " Telemetry

## 2024-05-30 NOTE — SUBJECTIVE & OBJECTIVE
Interval History: Patient seen and examined at the bedside, reports doing ok, no chest pain. Spouse updated at the bedside.     Review of Systems   All other systems reviewed and are negative.    Objective:     Vital Signs (Most Recent):  Temp: 97.8 °F (36.6 °C) (05/30/24 1140)  Pulse: (!) 56 (05/30/24 1140)  Resp: 18 (05/30/24 1140)  BP: (!) 99/58 (05/30/24 1140)  SpO2: (!) 94 % (05/30/24 1140) Vital Signs (24h Range):  Temp:  [97.5 °F (36.4 °C)-98 °F (36.7 °C)] 97.8 °F (36.6 °C)  Pulse:  [56-61] 56  Resp:  [16-18] 18  SpO2:  [92 %-95 %] 94 %  BP: ()/(58-89) 99/58     Weight: 87.1 kg (192 lb 0.3 oz)  Body mass index is 30.99 kg/m².  No intake or output data in the 24 hours ending 05/30/24 1349        Physical Exam  Vitals and nursing note reviewed.   Constitutional:       Appearance: Normal appearance.   HENT:      Head: Normocephalic and atraumatic.      Nose: Nose normal.      Mouth/Throat:      Mouth: Mucous membranes are moist.   Eyes:      Extraocular Movements: Extraocular movements intact.   Cardiovascular:      Rate and Rhythm: Normal rate and regular rhythm.      Pulses: Normal pulses.      Heart sounds: Normal heart sounds.   Pulmonary:      Effort: Pulmonary effort is normal.      Breath sounds: Normal breath sounds.   Abdominal:      General: Bowel sounds are normal.      Palpations: Abdomen is soft.   Musculoskeletal:         General: Normal range of motion.      Cervical back: Normal range of motion.   Skin:     General: Skin is warm.   Neurological:      General: No focal deficit present.      Mental Status: He is alert and oriented to person, place, and time. Mental status is at baseline.   Psychiatric:         Mood and Affect: Mood normal.         Behavior: Behavior normal.             Significant Labs: All pertinent labs within the past 24 hours have been reviewed.    Significant Imaging: I have reviewed all pertinent imaging results/findings within the past 24 hours.

## 2024-05-30 NOTE — ASSESSMENT & PLAN NOTE
Patient presented to the [facility] ED with complaints of chest pain.  Troponin elevated.   Echo    Result Date: 5/25/2024    Left Ventricle: The left ventricle is normal in size. Mildly increased   wall thickness. There is concentric hypertrophy. Mild global hypokinesis   present. There is mildly reduced systolic function with a visually   estimated ejection fraction of 45 - 50%. Ejection fraction by visual   approximation is 55%. There is normal diastolic function.    Right Ventricle: Normal right ventricular cavity size. Systolic   function is normal.    Aortic Valve: The aortic valve is structurally normal. Mildly calcified   cusps.    Mitral Valve: There is mild bileaflet sclerosis. Mildly thickened   leaflets. There is mild regurgitation with an eccentric jet.    IVC/SVC: Normal venous pressure at 3 mmHg.    Pericardium: There is a trivial effusion. No indication of cardiac   tamponade.         EDITH score is 4  Heart Score: 8  Cardiology consulted; s/p LHC today which showed 3 vessel CAD, accepted at [facility] for CABG eval.   S/p heparin infusion and now on BID Lovenox, continue aspirin, statin, and beta blocker (hold Brillinta).   Monitor on tele.

## 2024-05-30 NOTE — ASSESSMENT & PLAN NOTE
Chronic, uncontrolled. Latest blood pressure and vitals reviewed-     Temp:  [97.5 °F (36.4 °C)-98 °F (36.7 °C)]   Pulse:  [56-61]   Resp:  [16-18]   BP: ()/(58-89)   SpO2:  [92 %-95 %] .     Resume metoprolol and lisinopril.     While in the hospital, will manage blood pressure as follows; Continue home antihypertensive regimen    Will utilize p.r.n. blood pressure medication only if patient's blood pressure greater than 180/110 and he develops symptoms such as worsening chest pain or shortness of breath.  controlled

## 2024-05-31 NOTE — PLAN OF CARE
[facility] - [facility] Telemetry  Discharge Final Note    Primary Care Provider: Valentina Mcintyre FNP    Expected Discharge Date: 5/30/2024    Final Discharge Note (most recent)       Final Note - 05/31/24 1349          Final Note    Assessment Type Final Discharge Note     Anticipated Discharge Disposition Another Health Care Institution Not Defined        Post-Acute Status    Post-Acute Authorization Other   Pt transferred to higher level of care - [facility]    Discharge Delays None known at this time                     Important Message from Medicare  Important Message from Medicare regarding Discharge Appeal Rights: Signed/date by patient/caregiver     Date IMM was signed: 05/29/24  Time IMM was signed: 0830    Contact Info       Alondra Bennett FNP   Specialty: Cardiology    [address] [facility]  [place] [id number]   Phone: [phone]       Next Steps: Follow up on 7/9/2024    Instructions: Appointment scheduled on 7/9/2024 at 9:00          Pt transferred to higher level of care - [facility]

## 2024-06-03 NOTE — PHYSICIAN QUERY
"Provider, due to the conflicting clinical picture, please clinically validate the diagnosis of     Acute on Chronic Systolic (HFrEF) Heart Failure          If validated, please provide additional clinical support for the diagnosis.   The condition is not confirmed and/or it has been ruled out       To respond, click "New Note"  Please document in your progress notes daily for the duration of treatment until resolved and include in your discharge summary.  "

## 2024-06-24 DIAGNOSIS — I21.4 NSTEMI (NON-ST ELEVATED MYOCARDIAL INFARCTION): Primary | ICD-10-CM

## 2024-07-01 DIAGNOSIS — E78.5 HYPERLIPIDEMIA, UNSPECIFIED HYPERLIPIDEMIA TYPE: Primary | ICD-10-CM

## 2024-07-01 RX ORDER — ROSUVASTATIN CALCIUM 40 MG/1
40 TABLET, COATED ORAL NIGHTLY
Qty: 90 TABLET | Refills: 3 | Status: SHIPPED | OUTPATIENT
Start: 2024-07-01 | End: 2025-07-01

## 2024-07-08 ENCOUNTER — OFFICE VISIT (OUTPATIENT)
Dept: BEHAVIORAL HEALTH | Facility: CLINIC | Age: 69
End: 2024-07-08
Payer: MEDICARE

## 2024-07-08 VITALS
SYSTOLIC BLOOD PRESSURE: 100 MMHG | BODY MASS INDEX: 26.84 KG/M2 | HEART RATE: 67 BPM | RESPIRATION RATE: 20 BRPM | DIASTOLIC BLOOD PRESSURE: 60 MMHG | HEIGHT: 66 IN | OXYGEN SATURATION: 96 % | TEMPERATURE: 97 F | WEIGHT: 167 LBS

## 2024-07-08 DIAGNOSIS — F41.9 ANXIETY: Chronic | ICD-10-CM

## 2024-07-08 DIAGNOSIS — F33.1 MODERATE EPISODE OF RECURRENT MAJOR DEPRESSIVE DISORDER: Primary | ICD-10-CM

## 2024-07-08 DIAGNOSIS — R53.83 OTHER FATIGUE: Chronic | ICD-10-CM

## 2024-07-08 PROCEDURE — G2211 COMPLEX E/M VISIT ADD ON: HCPCS | Mod: ,,, | Performed by: NURSE PRACTITIONER

## 2024-07-08 PROCEDURE — 99214 OFFICE O/P EST MOD 30 MIN: CPT | Mod: ,,, | Performed by: NURSE PRACTITIONER

## 2024-07-08 PROCEDURE — 90833 PSYTX W PT W E/M 30 MIN: CPT | Mod: ,,, | Performed by: NURSE PRACTITIONER

## 2024-07-08 RX ORDER — CITALOPRAM 40 MG/1
40 TABLET, FILM COATED ORAL DAILY
Qty: 90 TABLET | Refills: 3 | Status: SHIPPED | OUTPATIENT
Start: 2024-07-08 | End: 2025-07-03

## 2024-07-08 NOTE — PROGRESS NOTES
"Outpatient Psychiatry Follow-Up Visit (MD/NP)    7/8/2024    Clinical Status of Patient:  Outpatient (Ambulatory)    Chief Complaint:  Richard Soliz is a 68 y.o. male who presents today for follow-up of depression and anxiety.  Met with patient.      Interim Events/Subjective Report/Content of Current Session: Is 5-6 weeks post op from a CABG x 5. He had had some GI upset for a few days and then had severe pain in the center of his chest and pain in bilateral arms. He was taken to the Emergency Department and then transported to [facility] where he was told that he had had two heart attacks and he needed emergency cardiac intervention. He has been fatigued since then. He has a follow up with Cardiology tomorrow. He has been seeing life in a different light and does not wish to take anything for granted any more. He has also had some issues with his sleep schedule being "way off". He has been avoiding going outside very much because he does not want to get overheated. Physical activity has been limited to walking around the house. Discussed the nature of anxiety and the physiological responses and how to control them through the use of coping skills and mechanisms. Discussed and recommended practicing mindfulness, prayer, and meditation to limit stressors, anxiety, and depression symptoms.     Psychotherapy:  Target symptoms: recurrent depression, anxiety   Why chosen therapy is appropriate versus another modality: relevant to diagnosis, patient responds to this modality  Outcome monitoring methods: self-report, observation, checklist/rating scale  Therapeutic intervention type: supportive psychotherapy  Topics discussed/themes: stress related to medical comorbidities, difficulty managing affect in interpersonal relationships, building skills sets for symptom management, symptom recognition  The patient's response to the intervention is accepting. The patient's progress toward treatment goals is good.   Duration of " intervention: 22 minutes.    Patient  reviewed this visit.     Review of Systems   PSYCHIATRIC: Pertinant items are noted in the narrative.  CONSTITUTIONAL: No weight gain or loss.   RESPIRATORY: No shortness of breath.  CARDIOVASCULAR: No tachycardia or chest pain.    Past Medical, Family and Social History: The patient's past medical, family and social history have been reviewed and updated as appropriate within the electronic medical record - see encounter notes.    Perception of medication efficacy: Citalopram is working well.    Compliance: yes    Side effects: None    Risk Parameters:  Patient reports no suicidal ideation  Patient reports no homicidal ideation  Patient reports no self-injurious behavior  Patient reports no violent behavior    Exam (detailed: at least 9 elements; comprehensive: all 15 elements)     PHQ-9: Little interest or pleasure in doing things: (P) Nearly every day  Feeling down, depressed, or hopeless: (P) Nearly every day  Trouble falling or staying asleep, or sleeping too much: (P) Nearly every day  Feeling tired or having little energy: (P) Nearly every day  Poor appetite or overeating: (P) Several days  Feeling bad about yourself - or that you are a failure or have let yourself or your family down: (P) Not at all  Trouble concentrating on things, such as reading the newspaper or watching television: (P) Nearly every day  Moving or speaking so slowly that other people could have noticed. Or the opposite - being so fidgety or restless that you have been moving around a lot more than usual: (P) More than half the days  Thoughts that you would be better off dead, or of hurting yourself in some way: (P) Not at all  PHQ-9 Total Score: (P) 18  If you checked off any problems, how difficult have these problems made it for you to do your work, take care of things at home, or get along with other people?: (P) Somewhat difficult  PHQ-9 Total Score: (P) 18     MANE-7: MANE-7  "Questionnaire  Feeling nervous, anxious, or on edge: Nearly every day  Not being able to stop or control worrying: Several days  Worrying too much about different things: Several days  Trouble relaxing: Several days  Being so restless that it is hard to sit still: Not at all  Becoming easily annoyed or irritable: Several days  Feeling afraid as if something awful might happen: Not at all  MANE-7 Total Score: 7    Constitutional  Vitals:  Most recent vital signs, dated greater than 90 days prior to this appointment, were reviewed.   Vitals:    07/08/24 1520   BP: 100/60   Pulse: 67   Resp: 20   Temp: 96.8 °F (36 °C)   SpO2: 96%   Weight: 75.8 kg (167 lb)   Height: 5' 6" (1.676 m)   Body mass index is 26.95 kg/m².     General:  age appropriate, well nourished, casually dressed, neatly groomed     Musculoskeletal  Muscle Strength/Tone:  no spasicity, no rigidity, no cogwheeling, no flaccidity, no paratonia, no dyskinesia, no dystonia, no tremor   Gait & Station:  non-ataxic     Psychiatric  Speech:  no latency; no press   Mood & Affect:  anxious, depressed  congruent and appropriate   Thought Process:  normal and logical   Associations:  intact   Thought Content:  normal, no suicidality, no homicidality, delusions, or paranoia   Insight:  intact, has awareness of illness   Judgement: behavior is adequate to circumstances   Orientation:  grossly intact   Memory: intact for content of interview   Language: grossly intact   Attention Span & Concentration:  able to focus   Fund of Knowledge:  intact and appropriate to age and level of education, familiar with aspects of current personal life, 4 of 4 recent presidents     Assessment and Diagnosis   Status/Progress: Based on the examination today, the patient's problem(s) is/are adequately but not ideally controlled and improving .  New problems have not been presented today.   Co-morbidities, Diagnostic uncertainty, and Lack of compliance are complicating management of the " primary condition.  There are no active rule-out diagnoses for this patient at this time.     General Impression: He has had a difficult 5-6 weeks recovering from Cardiac Intervention. He has been fatigued from this and I suspect this will improve over the coming months. Advised to increase mobility and walk as is tolerable around the comfort of his house if it is too hot outside. Difficulties with sleeping and some other fatigue symptoms are due to his lack of a consistent schedule. He would benefit from beginning a regimen. His PHQ-9 score is elevated and he attributes this to being limited to being at home and adds that he hasn't driven in over a month. Will leave Citalopram at current dose per his request. He continues to have anxiety but attests that it is manageable.       ICD-10-CM ICD-9-CM   1. Moderate episode of recurrent major depressive disorder  F33.1 296.32   2. Anxiety  F41.9 300.00   3. Other fatigue  R53.83 780.79       Intervention/Counseling/Treatment Plan   Medication Management: Continue current medications. The risks and benefits of medication were discussed with the patient. Verbalized understanding.  Counseling provided with patient as follows: importance of compliance with chosen treatment options was emphasized, risks and benefits of treatment options, including medications, were discussed with the patient, risk factor reduction, prognosis, patient education, instructions for  management, treatment, and follow-up were reviewed  Visit today included increased complexity associated with the care of the episodic problem Major Depressive Disorder addressed and managing the longitudinal care of the patient due to the serious and/or complex managed problem(s) Chronic Major Depressive Disorder, Anxiety, fatigue, and status post MI and CABG x 5.    Medications:   Medication List with Changes/Refills   Current Medications    ALBUTEROL (PROVENTIL HFA) 90 MCG/ACTUATION INHALER    Inhale 2 puffs into  the lungs every 6 (six) hours as needed for Wheezing. Rescue       Start Date: 7/20/2023 End Date: 7/19/2024    C,E,ZINC,COPPER 11-OMEGA3S-LUT (OCUVITE ADULT 50 PLUS) 250-5-1 MG CAP    Take 1 tablet by mouth once daily.       Start Date: --        End Date: --    CETIRIZINE (ZYRTEC) 10 MG TABLET    TAKE ONE (1) TABLET  BY MOUTH ONCE DAILY.       Start Date: 10/27/2022End Date: --    CHOLECALCIFEROL, VITAMIN D3, 125 MCG (5,000 UNIT) TAB    Take 5,000 Units by mouth once daily.       Start Date: --        End Date: --    CINNAMON BARK-CHROMIUM PICOLIN 500-100 MG-MCG CAP    Take 1 capsule by mouth once daily.       Start Date: --        End Date: --    EMPAGLIFLOZIN (JARDIANCE) 25 MG TABLET    Take 1 tablet (25 mg total) by mouth once daily.       Start Date: 7/20/2023 End Date: --    FINASTERIDE (PROSCAR) 5 MG TABLET    Take 1 tablet (5 mg total) by mouth once daily.       Start Date: 1/11/2024 End Date: 1/10/2025    GLUCOSAMINE-CHONDROITIN 500-400 MG TABLET    Take 1 tablet by mouth 3 (three) times daily.       Start Date: --        End Date: --    LISINOPRIL 10 MG TABLET    Take 1 tablet (10 mg total) by mouth once daily.       Start Date: 5/30/2024 End Date: 5/30/2025    METFORMIN (GLUCOPHAGE) 1000 MG TABLET    Take 1 tablet (1,000 mg total) by mouth 2 (two) times a day.       Start Date: 12/13/2023End Date: --    METOPROLOL TARTRATE (LOPRESSOR) 25 MG TABLET    Take 1 tablet (25 mg total) by mouth 2 (two) times daily.       Start Date: 5/29/2024 End Date: 5/29/2025    MONTELUKAST (SINGULAIR) 10 MG TABLET    Take 1 tablet (10 mg total) by mouth once daily.       Start Date: 1/11/2024 End Date: --    OMEPRAZOLE (PRILOSEC) 20 MG CAPSULE    Take 20 mg by mouth once daily.       Start Date: --        End Date: --    POTASSIUM CITRATE 99 MG CAP    Take by mouth.       Start Date: --        End Date: --    ROSUVASTATIN (CRESTOR) 40 MG TAB    Take 1 tablet (40 mg total) by mouth every evening.       Start Date: 7/1/2024   End Date: 7/1/2025   Changed and/or Refilled Medications    Modified Medication Previous Medication    CITALOPRAM (CELEXA) 40 MG TABLET citalopram (CELEXA) 40 MG tablet       Take 1 tablet (40 mg total) by mouth once daily.    Take 1 tablet (40 mg total) by mouth once daily.       Start Date: 7/8/2024  End Date: 7/3/2025    Start Date: 3/5/2024  End Date: 7/8/2024     Return to Clinic: 6 months    Patient instructed to please go to emergency department if feeling as though you are going to harm to yourself or others or if you are in crisis; or to please call the clinic to report any worsening of symptoms or problems associated with medication.     Total Time: 43 minutes

## 2024-07-11 ENCOUNTER — HOSPITAL ENCOUNTER (OUTPATIENT)
Dept: RADIOLOGY | Facility: HOSPITAL | Age: 69
Discharge: HOME | End: 2024-07-11
Attending: NURSE PRACTITIONER
Payer: MEDICARE

## 2024-07-11 ENCOUNTER — OFFICE VISIT (OUTPATIENT)
Dept: CARDIOLOGY | Facility: CLINIC | Age: 69
End: 2024-07-11
Payer: MEDICARE

## 2024-07-11 VITALS
WEIGHT: 148 LBS | OXYGEN SATURATION: 94 % | SYSTOLIC BLOOD PRESSURE: 138 MMHG | BODY MASS INDEX: 23.89 KG/M2 | DIASTOLIC BLOOD PRESSURE: 70 MMHG | HEART RATE: 94 BPM

## 2024-07-11 DIAGNOSIS — I21.4 NSTEMI (NON-ST ELEVATED MYOCARDIAL INFARCTION): ICD-10-CM

## 2024-07-11 DIAGNOSIS — I10 HYPERTENSION, UNSPECIFIED TYPE: ICD-10-CM

## 2024-07-11 DIAGNOSIS — I25.10 CORONARY ARTERY DISEASE, UNSPECIFIED VESSEL OR LESION TYPE, UNSPECIFIED WHETHER ANGINA PRESENT, UNSPECIFIED WHETHER NATIVE OR TRANSPLANTED HEART: ICD-10-CM

## 2024-07-11 DIAGNOSIS — R09.89 ABNORMAL LUNG SOUNDS: ICD-10-CM

## 2024-07-11 DIAGNOSIS — E11.9 DIABETES MELLITUS WITHOUT COMPLICATION: ICD-10-CM

## 2024-07-11 DIAGNOSIS — I25.10 CORONARY ARTERY DISEASE, UNSPECIFIED VESSEL OR LESION TYPE, UNSPECIFIED WHETHER ANGINA PRESENT, UNSPECIFIED WHETHER NATIVE OR TRANSPLANTED HEART: Primary | ICD-10-CM

## 2024-07-11 DIAGNOSIS — I25.5 ISCHEMIC CARDIOMYOPATHY: ICD-10-CM

## 2024-07-11 LAB
OHS QRS DURATION: 92 MS
OHS QTC CALCULATION: 477 MS

## 2024-07-11 PROCEDURE — 71046 X-RAY EXAM CHEST 2 VIEWS: CPT | Mod: TC

## 2024-07-11 PROCEDURE — 99215 OFFICE O/P EST HI 40 MIN: CPT | Mod: PBBFAC,25 | Performed by: NURSE PRACTITIONER

## 2024-07-11 PROCEDURE — 93005 ELECTROCARDIOGRAM TRACING: CPT | Mod: PBBFAC | Performed by: STUDENT IN AN ORGANIZED HEALTH CARE EDUCATION/TRAINING PROGRAM

## 2024-07-11 PROCEDURE — 99215 OFFICE O/P EST HI 40 MIN: CPT | Mod: S$PBB,,, | Performed by: NURSE PRACTITIONER

## 2024-07-11 PROCEDURE — 99999 PR PBB SHADOW E&M-EST. PATIENT-LVL V: CPT | Mod: PBBFAC,,, | Performed by: NURSE PRACTITIONER

## 2024-07-11 PROCEDURE — 71046 X-RAY EXAM CHEST 2 VIEWS: CPT | Mod: 26,,, | Performed by: RADIOLOGY

## 2024-07-11 PROCEDURE — 93010 ELECTROCARDIOGRAM REPORT: CPT | Mod: S$PBB,,, | Performed by: STUDENT IN AN ORGANIZED HEALTH CARE EDUCATION/TRAINING PROGRAM

## 2024-07-11 RX ORDER — CITALOPRAM 40 MG/1
40 TABLET, FILM COATED ORAL DAILY
COMMUNITY
Start: 2024-05-31 | End: 2024-07-11 | Stop reason: SDUPTHER

## 2024-07-11 RX ORDER — TRAMADOL HYDROCHLORIDE 50 MG/1
50 TABLET ORAL EVERY 6 HOURS PRN
COMMUNITY
Start: 2024-05-31

## 2024-07-11 RX ORDER — AMIODARONE HYDROCHLORIDE 200 MG/1
400 TABLET ORAL DAILY
COMMUNITY
Start: 2024-05-31 | End: 2024-07-11

## 2024-07-11 RX ORDER — ROSUVASTATIN CALCIUM 40 MG/1
40 TABLET, COATED ORAL NIGHTLY
COMMUNITY
Start: 2024-05-31 | End: 2024-07-11 | Stop reason: SDUPTHER

## 2024-07-11 RX ORDER — METOPROLOL TARTRATE 25 MG/1
25 TABLET, FILM COATED ORAL 2 TIMES DAILY
Qty: 180 TABLET | Refills: 3 | Status: SHIPPED | OUTPATIENT
Start: 2024-07-11 | End: 2025-07-11

## 2024-07-11 NOTE — PATIENT INSTRUCTIONS
Increase metoprolol from 12.5mg twice a day to 25mg twice a day  Cardiac rehab  Chest x-ray today  Follow up in one month with Dr. Cooper

## 2024-07-12 ENCOUNTER — TELEPHONE (OUTPATIENT)
Dept: CARDIOLOGY | Facility: CLINIC | Age: 69
End: 2024-07-12
Payer: MEDICARE

## 2024-07-12 PROBLEM — I25.5 ISCHEMIC CARDIOMYOPATHY: Status: ACTIVE | Noted: 2024-05-25

## 2024-07-12 PROBLEM — R09.89 ABNORMAL LUNG SOUNDS: Status: ACTIVE | Noted: 2024-07-12

## 2024-07-12 RX ORDER — INSULIN GLARGINE 100 [IU]/ML
20 INJECTION, SOLUTION SUBCUTANEOUS NIGHTLY
COMMUNITY

## 2024-07-12 NOTE — PROGRESS NOTES
PCP: Valentina Mcintyre FNP    Referring Provider:     Subjective:   Richard Soliz is a 68 y.o. male with hx of COPD, HTN, HLD, DM2, BPH, anxiety who presents for hospital discharge follow up for NSTEMI s/p CABG at [facility] by Dr. Han.     Pt states he is doing well. He denies any chest pain, SOB or any other cardiac complaints. He states he still has some fatigue. He has been released from Dr. Han at [facility]. Midline chest incision is well approximated with no s/sx of infection. He is awaiting approval for cardiac rehab. BP is controlled. HR is in the 90s. EKG shows NSR with rightward axis, inferior-posterior infarct age undetermined. Obtaining records from [facility]. On exam, left lower lobe lungs sounds are quiet. Will get a chest xray.      EKG   Results for orders placed or performed in visit on 07/11/24   EKG 12-lead    Collection Time: 07/11/24  2:32 PM   Result Value Ref Range    QRS Duration 92 ms    [facility] QTC Calculation 477 ms    Narrative    Test Reason : I25.10,    Vent. Rate : 090 BPM     Atrial Rate : 090 BPM     P-R Int : 142 ms          QRS Dur : 092 ms      QT Int : 390 ms       P-R-T Axes : 062 107 095 degrees     QTc Int : 477 ms    Normal sinus rhythm  Rightward axis  Inferior-posterior infarct ,age undetermined  Abnormal ECG  When compared with ECG of 27-MAY-2024 10:51,  Significant changes have occurred  Confirmed by Collette Lester MD ([id number]) on 7/11/2024 11:05:33 PM    Referred By:             Confirmed By:Collette Lester MD     ECHO Results for orders placed during the hospital encounter of 05/25/24    Echo    Interpretation Summary    Left Ventricle: The left ventricle is normal in size. Mildly increased wall thickness. There is concentric hypertrophy. Mild global hypokinesis present. There is mildly reduced systolic function with a visually estimated ejection fraction of 45 - 50%. Ejection fraction by visual approximation is 55%. There is normal diastolic function.    Right Ventricle: Normal right  ventricular cavity size. Systolic function is normal.    Aortic Valve: The aortic valve is structurally normal. Mildly calcified cusps.    Mitral Valve: There is mild bileaflet sclerosis. Mildly thickened leaflets. There is mild regurgitation with an eccentric jet.    IVC/SVC: Normal venous pressure at 3 mmHg.    Pericardium: There is a trivial effusion. No indication of cardiac tamponade.    [facility] Results for orders placed during the hospital encounter of 05/25/24    Cardiac catheterization    Conclusion    Discussed with patient who was amenable to CABG - discussed w/ CTS @ [facility] - Dr. Ky Han accepted patient. Plan direct hospital to hospital transfer.    The RPAV lesion was 90% stenosed.    The RPDA lesion was 90% stenosed.    The Mid RCA to Dist RCA lesion was 70% stenosed.    The 1st Mrg lesion was 90% stenosed.    The Ramus lesion was 80% stenosed.    The Mid LAD lesion was 80% stenosed.    The pre-procedure left ventricular end diastolic pressure was 11.    The estimated blood loss was none.    There was three vessel coronary artery disease.    The procedure log was documented by No documenter listed and verified by Avery Cooper MD.    Date: 5/28/2024  Time: 2:01 PM        Lab Results   Component Value Date     05/30/2024    K 4.2 05/30/2024     (H) 05/30/2024    CO2 25 05/30/2024    BUN 15 05/30/2024    CREATININE 1.05 05/30/2024    CALCIUM 9.0 05/30/2024    ANIONGAP 9 05/30/2024    EGFRNONAA 64 05/19/2022       Lab Results   Component Value Date    CHOL 128 05/25/2024    CHOL 165 01/22/2024    CHOL 253 (H) 07/20/2023     Lab Results   Component Value Date    HDL 44 05/25/2024    HDL 45 01/22/2024    HDL 47 07/20/2023     Lab Results   Component Value Date    LDLCALC 59 05/25/2024    LDLCALC 72 01/22/2024    LDLCALC 146 07/20/2023     Lab Results   Component Value Date    TRIG 126 05/25/2024    TRIG 238 (H) 01/22/2024    TRIG 299 (H) 07/20/2023     Lab Results   Component Value Date     CHOLHDL 2.9 05/25/2024    CHOLHDL 3.7 01/22/2024    CHOLHDL 5.4 07/20/2023       Lab Results   Component Value Date    WBC 9.56 05/30/2024    HGB 14.6 05/30/2024    HCT 44.1 05/30/2024    MCV 92.6 05/30/2024     05/30/2024           Current Outpatient Medications:     albuterol (PROVENTIL HFA) 90 mcg/actuation inhaler, Inhale 2 puffs into the lungs every 6 (six) hours as needed for Wheezing. Rescue, Disp: 18 g, Rfl: 3    aspirin 81 mg Cap, Take 1 tablet by mouth once daily., Disp: , Rfl:     C,E,zinc,copper 11-omega3s-lut (OCUVITE ADULT 50 PLUS) 250-5-1 mg Cap, Take 1 tablet by mouth once daily., Disp: , Rfl:     cetirizine (ZYRTEC) 10 MG tablet, TAKE ONE (1) TABLET  BY MOUTH ONCE DAILY., Disp: 90 tablet, Rfl: 3    cholecalciferol, vitamin D3, 125 mcg (5,000 unit) Tab, Take 5,000 Units by mouth once daily., Disp: , Rfl:     citalopram (CELEXA) 40 MG tablet, Take 1 tablet (40 mg total) by mouth once daily., Disp: 90 tablet, Rfl: 3    empagliflozin (JARDIANCE) 25 mg tablet, Take 1 tablet (25 mg total) by mouth once daily., Disp: 90 tablet, Rfl: 3    finasteride (PROSCAR) 5 mg tablet, Take 1 tablet (5 mg total) by mouth once daily., Disp: 90 tablet, Rfl: 3    metFORMIN (GLUCOPHAGE) 1000 MG tablet, Take 1 tablet (1,000 mg total) by mouth 2 (two) times a day., Disp: 180 tablet, Rfl: 3    montelukast (SINGULAIR) 10 mg tablet, Take 1 tablet (10 mg total) by mouth once daily., Disp: 90 tablet, Rfl: 3    omeprazole (PRILOSEC) 20 MG capsule, Take 20 mg by mouth once daily., Disp: , Rfl:     rosuvastatin (CRESTOR) 40 MG Tab, Take 1 tablet (40 mg total) by mouth every evening., Disp: 90 tablet, Rfl: 3    traMADoL (ULTRAM) 50 mg tablet, Take 50 mg by mouth every 6 (six) hours as needed for Pain., Disp: , Rfl:     metoprolol tartrate (LOPRESSOR) 25 MG tablet, Take 1 tablet (25 mg total) by mouth 2 (two) times daily., Disp: 180 tablet, Rfl: 3  No current facility-administered medications for this  visit.    Facility-Administered Medications Ordered in Other Visits:     BUPivacaine (PF) 0.25% (2.5 mg/ml) injection 1 mL, 1 mL, , , Ky Mercedes MD, 1 mL at 01/29/24 1430    triamcinolone acetonide injection 40 mg, 40 mg, Intra-articular, , Ky Mercedes MD, 40 mg at 01/29/24 1430    Review of Systems   Constitutional:  Positive for malaise/fatigue. Negative for chills, diaphoresis and fever.   Respiratory:  Negative for cough and shortness of breath.    Cardiovascular:  Negative for chest pain, palpitations, orthopnea, leg swelling and PND.   Gastrointestinal:  Negative for abdominal pain, nausea and vomiting.   Musculoskeletal:  Negative for falls.   Neurological:  Negative for focal weakness and weakness.         Objective:   /70 (BP Location: Left arm, Patient Position: Sitting)   Pulse 94   Wt 67.1 kg (148 lb)   SpO2 (!) 94%   BMI 23.89 kg/m²     Physical Exam  Constitutional:       General: He is not in acute distress.     Appearance: Normal appearance.   Cardiovascular:      Rate and Rhythm: Normal rate and regular rhythm.      Heart sounds: No murmur heard.  Pulmonary:      Effort: Pulmonary effort is normal.      Breath sounds: Examination of the left-lower field reveals decreased breath sounds. Decreased breath sounds present.   Musculoskeletal:      Cervical back: Neck supple. No rigidity.      Right lower leg: No edema.      Left lower leg: No edema.   Skin:     General: Skin is warm and dry.   Neurological:      Mental Status: He is alert and oriented to person, place, and time.   Psychiatric:         Mood and Affect: Mood normal.         Behavior: Behavior normal.           Assessment:     1. Coronary artery disease, unspecified vessel or lesion type, unspecified whether angina present, unspecified whether native or transplanted heart  EKG 12-lead    EKG 12-lead    X-Ray Chest PA And Lateral      2. Abnormal lung sounds  X-Ray Chest PA And Lateral      3. NSTEMI (non-ST elevated  myocardial infarction)  Ambulatory referral/consult to Cardiology      4. Ischemic cardiomyopathy        5. Hypertension, unspecified type        6. Diabetes mellitus without complication              Plan:   NSTEMI (non-ST elevated myocardial infarction)  - s/p CABG at [facility] - Dr. Han  - obtaining records  - pt doing well with no cardiac complaints  - EKG today shows NSR with inferior-posterior infarct, age undetermined  - awaiting approval for cardiac rehab  - on ASA and Crestor     Ischemic cardiomyopathy  - EF 45-50% per echo 5/25/24  - obtaining records from [facility]  - pt is euvolemic  - no cardiac complaints  - GDMT: metoprolol and Jardiance  - lisinopril was discontinued by PCP due to recent hypotension  - BP is well controlled today  - increase metoprolol from 12.5mg to 25mg bid - HR in the 90s today and has been in the 90s at home as well per pt's wife    Hypertension  - lisinopril was recently discontinued by PCP due to hypotension  - BP well controlled today - only on metoprolol currently    Abnormal lung sounds  - left lower lobe lung sounds are quiet  - CXR PA/LAT ordered    Diabetes mellitus without complication  - on Jardiance, metformin along with lantus and sliding scale insulin  - PCP managing      Follow up with Dr. Cooper in one month

## 2024-07-12 NOTE — ASSESSMENT & PLAN NOTE
- s/p CABG at [facility] - Dr. Han  - obtaining records  - pt doing well with no cardiac complaints  - EKG today shows NSR with inferior-posterior infarct, age undetermined  - awaiting approval for cardiac rehab  - on ASA and Crestor

## 2024-07-12 NOTE — ASSESSMENT & PLAN NOTE
- EF 45-50% per echo 5/25/24  - obtaining records from [facility]  - pt is euvolemic  - no cardiac complaints  - GDMT: metoprolol and Jardiance  - lisinopril was discontinued by PCP due to recent hypotension  - BP is well controlled today  - increase metoprolol from 12.5mg to 25mg bid - HR in the 90s today and has been in the 90s at home as well per pt's wife

## 2024-07-12 NOTE — ASSESSMENT & PLAN NOTE
- lisinopril was recently discontinued by PCP due to hypotension  - BP well controlled today - only on metoprolol currently

## 2024-07-17 DIAGNOSIS — I21.4 NON-ST ELEVATION MYOCARDIAL INFARCTION (NSTEMI): Primary | ICD-10-CM

## 2024-07-25 DIAGNOSIS — E11.9 DIABETES: ICD-10-CM

## 2024-07-25 DIAGNOSIS — I21.3 ST ELEVATION MYOCARDIAL INFARCTION (STEMI), UNSPECIFIED ARTERY: Primary | ICD-10-CM

## 2024-07-29 DIAGNOSIS — J30.2 SEASONAL ALLERGIC RHINITIS, UNSPECIFIED TRIGGER: ICD-10-CM

## 2024-07-29 RX ORDER — MONTELUKAST SODIUM 10 MG/1
10 TABLET ORAL DAILY
Qty: 90 TABLET | Refills: 3 | Status: SHIPPED | OUTPATIENT
Start: 2024-07-29

## 2024-08-13 ENCOUNTER — OFFICE VISIT (OUTPATIENT)
Dept: CARDIOLOGY | Facility: CLINIC | Age: 69
End: 2024-08-13
Payer: MEDICARE

## 2024-08-13 VITALS
HEIGHT: 66 IN | SYSTOLIC BLOOD PRESSURE: 90 MMHG | BODY MASS INDEX: 26.36 KG/M2 | WEIGHT: 164 LBS | HEART RATE: 79 BPM | DIASTOLIC BLOOD PRESSURE: 62 MMHG | OXYGEN SATURATION: 96 %

## 2024-08-13 DIAGNOSIS — Z95.1 STATUS POST CORONARY ARTERY BYPASS GRAFTING: Primary | ICD-10-CM

## 2024-08-13 PROCEDURE — 99999 PR PBB SHADOW E&M-EST. PATIENT-LVL IV: CPT | Mod: PBBFAC,,, | Performed by: HOSPITALIST

## 2024-08-13 PROCEDURE — 99215 OFFICE O/P EST HI 40 MIN: CPT | Mod: S$PBB,,, | Performed by: HOSPITALIST

## 2024-08-13 PROCEDURE — 99214 OFFICE O/P EST MOD 30 MIN: CPT | Mod: PBBFAC | Performed by: HOSPITALIST

## 2024-08-13 NOTE — PROGRESS NOTES
CARDIOVASCULAR CONSULTATION        REASON FOR CONSULT:   Richard Soliz is a 68 y.o. male who presents for   Chief Complaint   Patient presents with    Shortness of Breath     Minimal exertion lasting 2 to 3 minutes.          HISTORY OF PRESENT ILLNESS:   68 y.o. male who  has a past medical history of Acquired asymmetry of prostate, Acute prostatitis, Allergic rhinitis, Asthma, BPH with obstruction/lower urinary tract symptoms, Community acquired pneumonia of left lower lobe of lung, COPD (chronic obstructive pulmonary disease), Essential hypertension, GERD (gastroesophageal reflux disease), Hyperlipidemia, Personal history of colonic polyps, Type 2 diabetes mellitus, and Vitamin D deficiency.    Today we discussed the patient's cardiac symptoms at length. He is awaiting cardiac  rehab and seems to have some lingering shortness of breath, however on further questioning it appears to be positional, occurs every few days when he lies down to go to sleep and uses his L arm to pull the covers up, he gets short of breath for several minutes, though this neither prohibits sleep nor causes patient anxiety.     He has a pending ecg treadmill stress tomorrow prior to beginning cardiac rehab. Agreed to come observe stress test. Feel is very unlikely patient will not be candidate for cardiac rehab, however if there are any issues, we will address as they arise.     Patient amenable, wife amenable. Will addend visit w/ updates tomorrow.     PAST MEDICAL HISTORY:     Past Medical History:   Diagnosis Date    Acquired asymmetry of prostate     Acute prostatitis 10/29/2021    Allergic rhinitis     Asthma     BPH with obstruction/lower urinary tract symptoms 10/29/2021    [facility]     Community acquired pneumonia of left lower lobe of lung 2018    COPD (chronic obstructive pulmonary disease)     Essential hypertension     GERD (gastroesophageal reflux disease)     Hyperlipidemia     Personal history of colonic polyps  01/16/2017    repeat colonoscopy in 3 years    Type 2 diabetes mellitus     Vitamin D deficiency        PAST SURGICAL HISTORY:     Past Surgical History:   Procedure Laterality Date    COLONOSCOPY W/ BIOPSIES  01/16/2017    repeat in 3 years    CORONARY ANGIOGRAPHY INCLUDING BYPASS GRAFTS WITH CATHETERIZATION OF LEFT HEART N/A 5/28/2024    Procedure: ANGIOGRAM, CORONARY, INCLUDING BYPASS GRAFT, WITH LEFT HEART CATHETERIZATION;  Surgeon: Avery Cooper MD;  Location: [facility] CATH LAB;  Service: Cardiology;  Laterality: N/A;    nose surgery for broken nose as a child      right carpal tunnel surgery  03/2021    TONSILLECTOMY AND ADENOIDECTOMY         ALLERGIES AND MEDICATION:   Review of patient's allergies indicates:  No Known Allergies     Medication List            Accurate as of August 13, 2024  2:24 PM. If you have any questions, ask your nurse or doctor.                CONTINUE taking these medications      albuterol 90 mcg/actuation inhaler  Commonly known as: PROVENTIL HFA  Inhale 2 puffs into the lungs every 6 (six) hours as needed for Wheezing. Rescue     aspirin 81 mg Cap     cetirizine 10 MG tablet  Commonly known as: ZYRTEC  TAKE ONE (1) TABLET  BY MOUTH ONCE DAILY.     cholecalciferol (vitamin D3) 125 mcg (5,000 unit) Tab     citalopram 40 MG tablet  Commonly known as: CeleXA  Take 1 tablet (40 mg total) by mouth once daily.     empagliflozin 25 mg tablet  Commonly known as: JARDIANCE  Take 1 tablet (25 mg total) by mouth once daily.     finasteride 5 mg tablet  Commonly known as: PROSCAR  Take 1 tablet (5 mg total) by mouth once daily.     LANTUS SOLOSTAR U-100 INSULIN 100 unit/mL (3 mL) Inpn pen  Generic drug: insulin glargine U-100 (Lantus)     metFORMIN 1000 MG tablet  Commonly known as: GLUCOPHAGE  Take 1 tablet (1,000 mg total) by mouth 2 (two) times a day.     metoprolol tartrate 25 MG tablet  Commonly known as: LOPRESSOR  Take 1 tablet (25 mg total) by mouth 2 (two) times daily.      montelukast 10 mg tablet  Commonly known as: SINGULAIR  Take 1 tablet (10 mg total) by mouth once daily.     OCUVITE ADULT 50 PLUS 250-5-1 mg Cap  Generic drug: C,E,zinc,copper [id number]     omeprazole 20 MG capsule  Commonly known as: PRILOSEC     rosuvastatin 40 MG Tab  Commonly known as: CRESTOR  Take 1 tablet (40 mg total) by mouth every evening.     traMADoL 50 mg tablet  Commonly known as: ULTRAM              SOCIAL HISTORY:     Social History     Socioeconomic History    Marital status:      Spouse name: cecelia    Number of children: 1    Years of education: 12    Highest education level: 12th grade   Occupational History    Occupation:    Tobacco Use    Smoking status: Former    Smokeless tobacco: Never    Tobacco comments:     quit smoking 26 years ago   Substance and Sexual Activity    Alcohol use: Not Currently    Drug use: Never    Sexual activity: Not Currently     Social Determinants of Health     Financial Resource Strain: Low Risk  (5/26/2024)    Overall Financial Resource Strain (CARDIA)     Difficulty of Paying Living Expenses: Not hard at all   Food Insecurity: No Food Insecurity (5/26/2024)    Hunger Vital Sign     Worried About Running Out of Food in the Last Year: Never true     Ran Out of Food in the Last Year: Never true   Transportation Needs: No Transportation Needs (5/26/2024)    TRANSPORTATION NEEDS     Transportation : No   Physical Activity: Insufficiently Active (5/26/2024)    Exercise Vital Sign     Days of Exercise per Week: 3 days     Minutes of Exercise per Session: 20 min   Stress: No Stress Concern Present (5/26/2024)    [place] citizen [place] [employer] - Occupational Stress Questionnaire     Feeling of Stress : Not at all   Housing Stability: Low Risk  (5/26/2024)    Housing Stability Vital Sign     Unable to Pay for Housing in the Last Year: No     Homeless in the Last Year: No       FAMILY HISTORY:     Family History   Problem Relation Name  "Age of Onset    Diabetes Mother      Hyperlipidemia Mother      Hypertension Mother      Diabetes Father      Heart failure Father      Hypertension Father      Cancer Father      Multiple sclerosis Sister      Hypertension Brother      Nephrolithiasis Brother         REVIEW OF SYSTEMS:       Cardiology focused 12-point ROS otherwise unremarkable except for as mentioned in HPI and A/P.   Pertinent Positives and Negatives documented herein.       PHYSICAL EXAM:     Vitals:    08/13/24 1335   BP: 90/62   Pulse: 79    Body mass index is 26.47 kg/m².  Weight: 74.4 kg (164 lb)   Height: 5' 6" (167.6 cm)     Gen: NAD  HEENT: NC/AT, MMM  Chest: normal wob  CV: RRR, no m/g/r  Ext: mild/trace edema of BLEs  Skin: no rash  Psych: AMAA  Neuro: CNGI      DATA:     Laboratory:  CBC:  Recent Labs   Lab 05/27/24  0433 05/30/24  0527 08/05/24  0933   WBC 9.22 9.56 10.29   Hemoglobin 12.6 L 14.6 14.8   Hematocrit 38.7 L 44.1 48.8   Platelet Count 178 224 303       CHEMISTRIES:  Recent Labs   Lab 12/17/21  0823 05/19/22  1035 07/20/23  0937 05/25/24  0540 05/29/24  0510 05/30/24  0527   Glucose 194 H 225 H   < > 229 H 186 H 203 H   Sodium 141 136   < > 139 138 138   Potassium 5.1 4.4   < > 4.6 3.8 4.2   BUN 13 16   < > 15 13 15   Creatinine 1.00 1.21   < > 1.17 0.82 1.05   eGFR 80 64  --   --   --   --    Calcium 8.4 L 8.7   < > 9.0 9.2 9.0    < > = values in this interval not displayed.       CARDIAC BIOMARKERS:      No results found for: "BNP"    COAGS:  Recent Labs   Lab 05/25/24  0310 05/25/24  0540   INR 0.91 0.95       LIPIDS/LFTS:  Recent Labs   Lab 07/20/23  0937 01/22/24  1457 05/25/24  0310 05/25/24  0540 08/05/24  0933   Cholesterol 253 H 165  --  128 94   Triglycerides 299 H 238 H  --  126 138   HDL Cholesterol 47 45  --  44 44   LDL Calculated 146 72  --  59 22   Non- 120  --  84 50   AST 43 H 14 L 24  --   --    ALT 97 H 39 32  --   --        Hemoglobin A1C   Date Value Ref Range Status   08/05/2024 6.2 4.5 - 6.6 " % Final     Comment:       Normal:               <5.7%  Pre-Diabetic:       5.7% to 6.4%  Diabetic:             >6.4%  Diabetic Goal:     <7%   05/25/2024 8.1 (H) 4.5 - 6.6 % Final     Comment:       Normal:               <5.7%  Pre-Diabetic:       5.7% to 6.4%  Diabetic:             >6.4%  Diabetic Goal:     <7%   01/22/2024 8.3 (H) 4.5 - 6.6 % Final     Comment:       Normal:               <5.7%  Pre-Diabetic:       5.7% to 6.4%  Diabetic:             >6.4%  Diabetic Goal:     <7%       TSH  Recent Labs   Lab 05/19/22  1035 07/20/23  0937 01/22/24  1457   TSH 1.290 1.600 1.980       The ASCVD Risk score (Marlyn ROSS, et al., 2019) failed to calculate for the following reasons:    The patient has a prior MI or stroke diagnosis     IMAGING  No orders to display       ASSESSMENT AND PLAN     Patient Active Problem List   Diagnosis    BPH with obstruction/lower urinary tract symptoms    Acute prostatitis    Encounter for prostate cancer screening    Diabetes mellitus without complication    Hx of colonic polyps    Diverticulosis of colon    Adenomatous polyp of transverse colon    Adenomatous polyp of descending colon    Screening for malignant neoplasm of colon    Impingement of left shoulder    Moderate episode of recurrent major depressive disorder    Anxiety    NSTEMI (non-ST elevated myocardial infarction)    COPD (chronic obstructive pulmonary disease)    Hypertension    Obesity, diabetes, and hypertension syndrome    Depression    Ischemic cardiomyopathy    Sleep apnea    Other fatigue    Abnormal lung sounds         Problem Noted   Status Post Coronary Artery Bypass Grafting 8/13/2024            Results for orders placed during the hospital encounter of 05/25/24    Cardiac catheterization    Conclusion    Discussed with patient who was amenable to CABG - discussed w/ CTS @ [facility] - Dr. Ky Han accepted patient. Plan direct hospital to hospital transfer.    The RPAV lesion was 90% stenosed.    The RPDA lesion  was 90% stenosed.    The Mid RCA to Dist RCA lesion was 70% stenosed.    The 1st Mrg lesion was 90% stenosed.    The Ramus lesion was 80% stenosed.    The Mid LAD lesion was 80% stenosed.    The pre-procedure left ventricular end diastolic pressure was 11.    The estimated blood loss was none.    There was three vessel coronary artery disease.    The procedure log was documented by No documenter listed and verified by Avery Cooper MD.    Date: 5/28/2024  Time: 2:01 PM      Patient has since had bypass at [facility] by Dr. Han - today we discussed his candidacy for cardiac rehab which was in question presumably due to his shortness of breath which has improved since last we discussed. He has a pending treadmill ecg tomorrow to assess his rehab candidacy - I expect he will do just fine and plan to observe the stress test in person then barring unexpected outcome, approve his 10 or more weeks of cardiac rehab                          This note was dictated with the help of speech recognition software.  There might be un-intended errors and/or substitutions.

## 2024-08-14 ENCOUNTER — HOSPITAL ENCOUNTER (OUTPATIENT)
Dept: CARDIOLOGY | Facility: HOSPITAL | Age: 69
Discharge: HOME | End: 2024-08-14
Attending: HOSPITALIST
Payer: MEDICARE

## 2024-08-14 VITALS
HEART RATE: 85 BPM | DIASTOLIC BLOOD PRESSURE: 60 MMHG | SYSTOLIC BLOOD PRESSURE: 100 MMHG | HEIGHT: 66 IN | BODY MASS INDEX: 26.47 KG/M2

## 2024-08-14 DIAGNOSIS — I21.4 NON-ST ELEVATION MYOCARDIAL INFARCTION (NSTEMI): ICD-10-CM

## 2024-08-14 PROCEDURE — 93018 CV STRESS TEST I&R ONLY: CPT | Mod: ,,, | Performed by: HOSPITALIST

## 2024-08-14 PROCEDURE — 93016 CV STRESS TEST SUPVJ ONLY: CPT | Mod: ,,, | Performed by: HOSPITALIST

## 2024-08-14 PROCEDURE — 93017 CV STRESS TEST TRACING ONLY: CPT

## 2024-08-15 LAB
CV STRESS BASE HR: 85 BPM
DIASTOLIC BLOOD PRESSURE: 60 MMHG
OHS CV CPX 1 MINUTE RECOVERY HEART RATE: 98 BPM
OHS CV CPX 85 PERCENT MAX PREDICTED HEART RATE MALE: 131
OHS CV CPX MAX PREDICTED HEART RATE: 154
OHS CV CPX PATIENT IS FEMALE: 0
OHS CV CPX PATIENT IS MALE: 1
OHS CV CPX PEAK DIASTOLIC BLOOD PRESSURE: 66 MMHG
OHS CV CPX PEAK HEAR RATE: 110 BPM
OHS CV CPX PEAK RATE PRESSURE PRODUCT: NORMAL
OHS CV CPX PEAK SYSTOLIC BLOOD PRESSURE: 122 MMHG
OHS CV CPX PERCENT MAX PREDICTED HEART RATE ACHIEVED: 71
OHS CV CPX RATE PRESSURE PRODUCT PRESENTING: 8500
STRESS ECHO POST EXERCISE DUR MIN: 1 MINUTES
STRESS ECHO POST EXERCISE DUR SEC: 29 SECONDS
SYSTOLIC BLOOD PRESSURE: 100 MMHG

## 2024-08-20 ENCOUNTER — CLINICAL SUPPORT (OUTPATIENT)
Facility: HOSPITAL | Age: 69
End: 2024-08-20
Attending: HOSPITALIST
Payer: MEDICARE

## 2024-08-20 VITALS
SYSTOLIC BLOOD PRESSURE: 138 MMHG | DIASTOLIC BLOOD PRESSURE: 58 MMHG | TEMPERATURE: 97 F | RESPIRATION RATE: 14 BRPM | HEART RATE: 82 BPM | OXYGEN SATURATION: 97 %

## 2024-08-20 DIAGNOSIS — I21.4 NSTEMI (NON-ST ELEVATED MYOCARDIAL INFARCTION): Primary | ICD-10-CM

## 2024-08-20 PROCEDURE — 93798 PHYS/QHP OP CAR RHAB W/ECG: CPT

## 2024-08-20 NOTE — PROGRESS NOTES
Session: Orientation   Cardiac Rehab Individual Treatment Plan - Initial Assessment      Patient Name: Richard Soliz MRN: [id number]   : 1955   Age: 68 y.o.   Date of Event: 2024   Primary Diagnosis: NSTEMI    EF: 55%    Physical Assessment:   BP (!) 138/58   Pulse 82   Temp 97.3 °F (36.3 °C)   Resp 14   SpO2 97%     ASSESSMENT:  Heart Sounds: regular rate and rhythm, S1, S2 normal, no murmur, click, rub or gallop, normal apical impulse, regular rate and rhythm, and S1, S2 normal  Prosthetic Valve: No  Lung Sounds: normal air entry, lungs clear to auscultation  Capillary Refill: normal  Left Radial Pulse: Normal (+2)  Right Radial Pulse: Normal (+2)  Left Pedal Pulse: Normal (+2)  Right Pedal Pulse: Normal (+2)  Right Edema: none  Left Edema none  Strength: normal  Range of Motion: diminished range of motion, Pt has limited ROM lt shoulder  Existing Limitations:      Site   [] Arthritis, bursitis    [] Amputation, atrophy    [] Other: Lt shoulder limited ROM   []       Diabetic patient's foot examination comments: None -  Neither  Incisional site: N/A  Special needs: N/A    Psychosocial Assessment:   Outcome Survey Tools:    ANJANA SCORES:        TOTAL      Anxiety 0    Depression 0    Somatization 9    Hostility 5           SF-36  36-Item Short Form Survey (SF-36) Scoring  Physical Functionin  Role limitations due to physical health: 0  Role limitations due to emotional problems: 33.33  Energy/fatigue: 15  Emotional well-bein  Social functionin  Pain: 65  General health: 75            PHQ-9:      2024     1:00 PM   PHQ-9 Depression Patient Health Questionnaire   Over the last two weeks how often have you been bothered by little interest or pleasure in doing things 2   Over the last two weeks how often have you been bothered by feeling down, depressed or hopeless 1   Over the last two weeks how often have you been bothered by trouble falling or staying asleep, or sleeping  too much 0   Over the last two weeks how often have you been bothered by feeling tired or having little energy 3   Over the last two weeks how often have you been bothered by a poor appetite or overeating 0   Over the last two weeks how often have you been bothered by feeling bad about yourself - or that you are a failure or have let yourself or your family down 0   Over the last two weeks how often have you been bothered by trouble concentrating on things, such as reading the newspaper or watching television 0   Over the last two weeks how often have you been bothered by moving or speaking so slowly that other people could have noticed. 0   Over the last two weeks how often have you been bothered by thoughts that you would be better off dead, or of hurting yourself 0   If you checked off any problems, how difficult have these problems made it for you to do your work, take care of things at home or get along with other people? Somewhat difficult   PHQ-9 Score 6              Living Arrangements: Lives with spouse, Lives in home  Family Support: children, family, and spouse  Self Reported: Effective Coping Skills  Displays: socializes with others, happiness, smiles often, and calmness  Medication: not applicable    Psychosocial Plan:   Goals:  Verbalizes coping mechanisms  Maintain positive support system  Maintain positive outlook  Improve overall quality of life    Interventions/Recommendations:  Discussed Results of Surveys  Patient to Self Report Emotional Changes at Session Check In  Recommend Physical Activity  Recommend Attending Education Lectures  Notify MD: Declined  Program Referral: Declined  Pharmaceutical Intervention/Therapy: Declined  Other Needs: not applicable  Stage of Readiness to Change: Maintenance    Education:  Advanced Directives; needs reinforcement; Date: 08/20/24  Coping Techniques; needs reinforcement; Date: 08/20/2024  Stress Management; needs reinforcement; Date: 08/20/2024  Relaxation  Techniques; needs reinforcement; Date: 08/20/2024  Stress; needs reinforcement; Date: 08/20/24  Insomnia; needs reinforcement; Date: 08/20/24    Comments:      Patient has been instructed to notify staff in the event that circumstances worsen.  Patient verbalizes understanding.    Other Core Components/Risk Factors Assessment:   RISK FACTORS:  diabetes, nutrition, hyperlipidemia, hypertension, sedentary lifestyle, exercise    Learning Barriers: None    Education Level:  High School (9-12) or GED    Pre-test Score: 6/10    Medication Compliance: has been compliant with taking medications    Other Core Components/Risk Factors Plan:   Goals:  Increase exercise tolerance: In Progress  Increase knowledge of CAD: In Progress  Decrease blood pressure: In Progress  Weight loss: In Progress  Demonstrate accurate pulse taking: In Progress  Identify and manage personal areas of stress: In Progress  Control diabetes by adjusting diet and exercise: In Progress  Learn more about healthy eating: In Progress    Interventions/Recommendations:  Recommend regular attendance for Cardiac Rehab: Exercise and Education Lectures  Encourage medication compliance  Individual Education/ Counseling: Declined  Physician Referral: Declined    Education:    risk factors, needs reinforcement; Date: 08/20/2024         Education method adapted to patients education level and preferred method of learning.  Method: explanation, demonstration, and handouts    Comments:    Patient demonstrates a solid understanding of the objectives and is eager to participate in their care plan, showing readiness to learn. The patient remains calm and optimistic, socializing well with both staff and peers. Overall, the patient is cheerful, reports feeling content, and shows no signs of distress.  Other Core Components/Hypertension Assessment:   Resting BP:   BP Readings from Last 1 Encounters:   08/20/24 (!) 138/58         BP Diagnosis: Hypertensive  Patient reported  symptoms: none    Medications:  Medication Prescribed? Adherent? Exception   Beta-blocker []Yes  []No  []Unknown []Yes  []No  []Unknown    ACEI/ARB []Yes  []No  []Unknown []Yes  []No  []Unknown    Calcium Channel Blocker []Yes  []No  []Unknown []Yes  []No  []Unknown    Diuretic []Yes  []No  []Unknown []Yes  []No  []Unknown        Other Core Components/Hypertension Plan:   Goals:  Blood Pressure <130/80    Interventions/Recommendations:  Encourage medication compliance  Encourage sodium reduction  Encourage weight loss  Recommend physical activity  Educate on contributory factors  Reduce stress, anxiety, anger, depression, and/or chronic pain  Encourage home blood pressure monitoring  Recommend daily weights  Enroll in Digital Hypertension Program    Education:    Risk Factors; needs reinforcement; Date: 08/20/24         Comments:        Does the patient have Heart Failure? No    Other Core Components/Tobacco Cessation Assessment:   Smoking Status: Former (>6 months)  Primary Tobacco Type: Cigarette  Tobacco Usage:  n/a  Smoking Cessation Barriers:  n/a  Stage of Readiness to Change: Maintenance    Other Core Components/Tobacco Cessation Plan:   Goals:  Maintain non-smoking status    Interventions:  Maintains non-smoking status    Education:    Risk Factors; needs reinforcement; Date: 08/20/2024         Comments:      Discussed Cardiac Rehab program in depth with patient.  Medication list updated per patient & marked as reviewed.  Patient has been instructed to notify staff of any problems while attending rehab (ie: chest pain, shortness of breath, lightheadedness, dizziness).  Patient has been instructed to monitor blood pressure readings outside of rehab & to keep a daily log of the readings.  Patient verbalizes understanding.

## 2024-08-20 NOTE — PROGRESS NOTES
"  Cardiac Rehab Individual Treatment Plan - Initial Assessment      Patient Name: Richard Soliz MRN: [id number]   : 1955   Age: 68 y.o.   Primary Diagnosis: NSTEMI  Date of Event: 2024  EF: 50%  Risk Stratification: moderate  Referring Physician: Dr. Cooper   Exercise Assessment:     CPX/TM Date: 2024 Results   RHR 85   Max    Peak VO2 (CPX only) N/A   Actual METS (CPX only) N/A   Estimated METS N/A         Anthropometrics    Height 66" inches   Weight 167 lbs   BMI 26.7   Abdominal Girth N/A   Body Composition N/A     ST Depression noted on Stress Test?:No  Angina with exercise?: No   Fall Risk: No   Assistive Devices:  independent   Currently exercising? No   Pt stated there are limitations to exercise due to shoulder impingement.  Exercise modalities will be modified to meet the patient's needs and capabilities.     Exercise Plan:   Goals:  CR Exercise Goals: Attend Cardiac Rehab 3 times/week: In Progress    Intervention:   Discussed importance of regular attendance to cardiac rehab class    Exercise Prescription:  THR Range 55-95   Mode: Nustep   Frequency:  3 days/week   Duration:  30 - 60 minutes   Intensity:  12 - 15 RPE   Resistance Training:  Yes: 5-8 lb weights with 10-15 reps based on strength and range of motion assessment     Home Prescription:  Mode Aerobic   Frequency: 2- 3 days/week   Duration: 30-60 minutes   Resistance Training: None        Education:  Orientation to Equipment; demonstrated understanding; Date: 2024    Comments:  Patient verbalized understanding of the use and safety precautions of the exercise equipment. Denied any complaints of shortness of breath (SOB) or chest pain during orientation and initial exercise session. No observed limitations in range of motion (ROM) during the orientation. Patient tolerated the orientation session well without any signs of distress. Adjust the exercise prescription based on the patient's tolerance to the exercise " intensity observed during today's session. Continue to monitor the patient's response to exercise and adjust the plan as needed. All consent forms were signed, proper attire and shoes were discussed.         All consent forms were signed, proper attire and shoes were discussed.       Pt will begin Cardiac Rehab on 8/26/2024 at 1:45p.m.    The exercise prescription will be adjusted based on tolerance of exercise intensity by patient.    Arturo Cleveland M.S., CEP

## 2024-08-26 ENCOUNTER — CLINICAL SUPPORT (OUTPATIENT)
Facility: HOSPITAL | Age: 69
End: 2024-08-26
Attending: HOSPITALIST
Payer: MEDICARE

## 2024-08-26 DIAGNOSIS — I25.5 ISCHEMIC CARDIOMYOPATHY: Primary | ICD-10-CM

## 2024-08-26 PROBLEM — I21.4 NSTEMI (NON-ST ELEVATED MYOCARDIAL INFARCTION): Status: RESOLVED | Noted: 2024-05-25 | Resolved: 2024-08-26

## 2024-08-27 PROCEDURE — 93798 PHYS/QHP OP CAR RHAB W/ECG: CPT

## 2024-08-27 NOTE — PROGRESS NOTES
Patient arrived for cardiac rehab session. Pt reports to have eaten prior to exercise session.  The patient was on continuous EKG monitoring throughout the session. The patient tolerated exercise session well with no complaints. See cardiac rehab daily session report, 8/27/2024.

## 2024-08-29 ENCOUNTER — CLINICAL SUPPORT (OUTPATIENT)
Facility: HOSPITAL | Age: 69
End: 2024-08-29
Attending: HOSPITALIST
Payer: MEDICARE

## 2024-08-29 DIAGNOSIS — I21.4 NON-ST ELEVATION MYOCARDIAL INFARCTION (NSTEMI): Primary | ICD-10-CM

## 2024-08-29 PROCEDURE — 93798 PHYS/QHP OP CAR RHAB W/ECG: CPT

## 2024-08-29 NOTE — PROGRESS NOTES
Patient arrived for cardiac rehab session. Pt reports to have eaten prior to exercise session.  The patient was on continuous EKG monitoring throughout the session. The patient tolerated exercise session well with no complaints. See cardiac rehab daily session report 08/29/24.

## 2024-09-04 DIAGNOSIS — J44.9 CHRONIC OBSTRUCTIVE PULMONARY DISEASE, UNSPECIFIED COPD TYPE: Primary | ICD-10-CM

## 2024-09-04 DIAGNOSIS — J44.9 CHRONIC OBSTRUCTIVE PULMONARY DISEASE, UNSPECIFIED COPD TYPE: ICD-10-CM

## 2024-09-04 RX ORDER — FLUTICASONE PROPIONATE AND SALMETEROL 250; 50 UG/1; UG/1
1 POWDER RESPIRATORY (INHALATION) 2 TIMES DAILY
Qty: 180 EACH | Refills: 3 | Status: SHIPPED | OUTPATIENT
Start: 2024-09-04

## 2024-09-04 RX ORDER — FLUTICASONE PROPIONATE AND SALMETEROL 250; 50 UG/1; UG/1
1 POWDER RESPIRATORY (INHALATION) 2 TIMES DAILY
Qty: 180 EACH | Refills: 3 | Status: SHIPPED | OUTPATIENT
Start: 2024-09-04 | End: 2024-09-04 | Stop reason: SDUPTHER

## 2024-09-04 RX ORDER — ALBUTEROL SULFATE 90 UG/1
2 INHALANT RESPIRATORY (INHALATION) EVERY 6 HOURS PRN
Qty: 8.5 G | Refills: 3 | Status: SHIPPED | OUTPATIENT
Start: 2024-09-04 | End: 2025-09-04

## 2024-09-05 ENCOUNTER — CLINICAL SUPPORT (OUTPATIENT)
Facility: HOSPITAL | Age: 69
End: 2024-09-05
Attending: HOSPITALIST
Payer: MEDICARE

## 2024-09-05 DIAGNOSIS — I21.4 NSTEMI (NON-ST ELEVATION MYOCARDIAL INFARCTION): Primary | ICD-10-CM

## 2024-09-05 PROCEDURE — 93798 PHYS/QHP OP CAR RHAB W/ECG: CPT

## 2024-09-05 NOTE — PROGRESS NOTES
Patient arrived for cardiac rehab session. Pt reports to have eaten prior to exercise session.  The patient was on continuous EKG monitoring throughout the session. The patient tolerated exercise session well with no complaints. Please see cardiac rehab daily session report from:  09/05/2024

## 2024-09-09 ENCOUNTER — CLINICAL SUPPORT (OUTPATIENT)
Facility: HOSPITAL | Age: 69
End: 2024-09-09
Attending: HOSPITALIST
Payer: MEDICARE

## 2024-09-09 DIAGNOSIS — I21.4 NSTEMI (NON-ST ELEVATION MYOCARDIAL INFARCTION): Primary | ICD-10-CM

## 2024-09-09 PROCEDURE — 93798 PHYS/QHP OP CAR RHAB W/ECG: CPT

## 2024-09-09 NOTE — PROGRESS NOTES
Patient arrived for cardiac rehab session. Pt reports to have eaten prior to exercise session.  The patient was on continuous EKG monitoring throughout the session. The patient tolerated exercise session well with no complaints. See cardiac rehab daily session report 09/09/24.

## 2024-09-16 ENCOUNTER — CLINICAL SUPPORT (OUTPATIENT)
Facility: HOSPITAL | Age: 69
End: 2024-09-16
Attending: HOSPITALIST
Payer: MEDICARE

## 2024-09-16 DIAGNOSIS — I21.4 NSTEMI (NON-ST ELEVATION MYOCARDIAL INFARCTION): Primary | ICD-10-CM

## 2024-09-16 PROCEDURE — 93798 PHYS/QHP OP CAR RHAB W/ECG: CPT

## 2024-09-16 NOTE — PROGRESS NOTES
Patient arrived for cardiac rehab session. Pt reports to have eaten prior to exercise session.  The patient was on continuous EKG monitoring throughout the session. The patient tolerated exercise session well with no complaints. See cardiac rehab daily session report 09/16/24.

## 2024-09-18 ENCOUNTER — CLINICAL SUPPORT (OUTPATIENT)
Facility: HOSPITAL | Age: 69
End: 2024-09-18
Attending: HOSPITALIST
Payer: MEDICARE

## 2024-09-18 DIAGNOSIS — Z95.1 STATUS POST CORONARY ARTERY BYPASS GRAFTING: Primary | ICD-10-CM

## 2024-09-18 PROCEDURE — 93798 PHYS/QHP OP CAR RHAB W/ECG: CPT

## 2024-09-18 NOTE — PROGRESS NOTES
Magnesium Glycinate / L-threonate 400 mg daily at bedtime    Vitamin D3:  Vitamin K2 200 mcg daily with food (Mk-7)    Eucerin Cream: AS often as desired  Medicated Cream twice daily   Patient arrived for cardiac rehab session. Pt reports to have eaten prior to exercise session.  The patient was on continuous EKG monitoring throughout the session. The patient tolerated exercise session well with no complaints. See cardiac rehab daily session report, 9/18/2024.

## 2024-09-19 ENCOUNTER — CLINICAL SUPPORT (OUTPATIENT)
Facility: HOSPITAL | Age: 69
End: 2024-09-19
Attending: HOSPITALIST
Payer: MEDICARE

## 2024-09-19 DIAGNOSIS — I21.4 NSTEMI (NON-ST ELEVATION MYOCARDIAL INFARCTION): Primary | ICD-10-CM

## 2024-09-19 PROCEDURE — 93798 PHYS/QHP OP CAR RHAB W/ECG: CPT

## 2024-09-19 NOTE — PROGRESS NOTES
Patient arrived for cardiac rehab session. Pt reports to have eaten prior to exercise session.  The patient was on continuous EKG monitoring throughout the session. The patient tolerated exercise session well with no complaints. Please see cardiac rehab daily session report from:  09/19/2024

## 2024-09-23 ENCOUNTER — CLINICAL SUPPORT (OUTPATIENT)
Facility: HOSPITAL | Age: 69
End: 2024-09-23
Attending: HOSPITALIST
Payer: MEDICARE

## 2024-09-23 DIAGNOSIS — I21.4 NSTEMI (NON-ST ELEVATION MYOCARDIAL INFARCTION): Primary | ICD-10-CM

## 2024-09-23 PROCEDURE — 93798 PHYS/QHP OP CAR RHAB W/ECG: CPT

## 2024-09-23 NOTE — PROGRESS NOTES
Patient arrived for cardiac rehab session. Pt reports to have eaten prior to exercise session.  The patient was on continuous EKG monitoring throughout the session. The patient tolerated exercise session well with no complaints. See cardiac rehab daily session report 09/23/24.

## 2024-09-25 LAB
LEFT EYE DM RETINOPATHY: POSITIVE
RIGHT EYE DM RETINOPATHY: POSITIVE

## 2024-09-26 ENCOUNTER — CLINICAL SUPPORT (OUTPATIENT)
Facility: HOSPITAL | Age: 69
End: 2024-09-26
Attending: HOSPITALIST
Payer: MEDICARE

## 2024-09-26 DIAGNOSIS — I21.4 NSTEMI (NON-ST ELEVATION MYOCARDIAL INFARCTION): Primary | ICD-10-CM

## 2024-09-26 PROCEDURE — 93798 PHYS/QHP OP CAR RHAB W/ECG: CPT

## 2024-09-26 NOTE — PROGRESS NOTES
Patient arrived for cardiac rehab session. Pt reports to have eaten prior to exercise session.  The patient was on continuous EKG monitoring throughout the session. The patient tolerated exercise session well with no complaints. Please see cardiac rehab daily session report from: 09/26/2024

## 2024-09-30 ENCOUNTER — CLINICAL SUPPORT (OUTPATIENT)
Facility: HOSPITAL | Age: 69
End: 2024-09-30
Attending: HOSPITALIST
Payer: MEDICARE

## 2024-09-30 DIAGNOSIS — I21.4 NSTEMI (NON-ST ELEVATION MYOCARDIAL INFARCTION): Primary | ICD-10-CM

## 2024-09-30 PROCEDURE — 93798 PHYS/QHP OP CAR RHAB W/ECG: CPT

## 2024-09-30 NOTE — PROGRESS NOTES
Patient arrived for cardiac rehab session. Pt reports to have eaten prior to exercise session.  The patient was on continuous EKG monitoring throughout the session. The patient tolerated exercise session well with no complaints. See cardiac rehab daily session report 09/30/24.

## 2024-10-02 ENCOUNTER — CLINICAL SUPPORT (OUTPATIENT)
Facility: HOSPITAL | Age: 69
End: 2024-10-02
Attending: HOSPITALIST
Payer: MEDICARE

## 2024-10-02 DIAGNOSIS — I21.4 NON-ST ELEVATION MYOCARDIAL INFARCTION (NSTEMI): Primary | ICD-10-CM

## 2024-10-02 PROCEDURE — 93798 PHYS/QHP OP CAR RHAB W/ECG: CPT

## 2024-10-02 NOTE — PROGRESS NOTES
Patient arrived for cardiac rehab session. Pt reports to have eaten prior to exercise session.  The patient was on continuous EKG monitoring throughout the session. The patient tolerated exercise session well with no complaints. See cardiac rehab daily session report, 10/2/2024.

## 2024-10-03 ENCOUNTER — CLINICAL SUPPORT (OUTPATIENT)
Facility: HOSPITAL | Age: 69
End: 2024-10-03
Attending: HOSPITALIST
Payer: MEDICARE

## 2024-10-03 DIAGNOSIS — I21.4 NSTEMI (NON-ST ELEVATION MYOCARDIAL INFARCTION): Primary | ICD-10-CM

## 2024-10-03 PROCEDURE — 93798 PHYS/QHP OP CAR RHAB W/ECG: CPT

## 2024-10-03 NOTE — PROGRESS NOTES
Patient arrived for cardiac rehab session. Pt reports to have eaten prior to exercise session.  The patient was on continuous EKG monitoring throughout the session. The patient tolerated exercise session well with no complaints. Please see cardiac rehab daily session report from:  10/03/2024

## 2024-10-07 ENCOUNTER — CLINICAL SUPPORT (OUTPATIENT)
Facility: HOSPITAL | Age: 69
End: 2024-10-07
Attending: HOSPITALIST
Payer: MEDICARE

## 2024-10-07 DIAGNOSIS — I21.4 NSTEMI (NON-ST ELEVATION MYOCARDIAL INFARCTION): Primary | ICD-10-CM

## 2024-10-07 PROCEDURE — 93798 PHYS/QHP OP CAR RHAB W/ECG: CPT

## 2024-10-07 NOTE — PROGRESS NOTES
Patient arrived for cardiac rehab session. Pt reports to have eaten prior to exercise session.  The patient was on continuous EKG monitoring throughout the session. The patient tolerated exercise session well with no complaints. Please see cardiac rehab daily session report from:  10/07/2024

## 2024-10-09 ENCOUNTER — CLINICAL SUPPORT (OUTPATIENT)
Facility: HOSPITAL | Age: 69
End: 2024-10-09
Attending: HOSPITALIST
Payer: MEDICARE

## 2024-10-09 DIAGNOSIS — I21.4 NSTEMI (NON-ST ELEVATED MYOCARDIAL INFARCTION): Primary | ICD-10-CM

## 2024-10-09 DIAGNOSIS — Z71.89 COMPLEX CARE COORDINATION: ICD-10-CM

## 2024-10-10 ENCOUNTER — CLINICAL SUPPORT (OUTPATIENT)
Facility: HOSPITAL | Age: 69
End: 2024-10-10
Attending: HOSPITALIST
Payer: MEDICARE

## 2024-10-10 DIAGNOSIS — I21.4 NON-ST ELEVATION MYOCARDIAL INFARCTION (NSTEMI): Primary | ICD-10-CM

## 2024-10-10 PROCEDURE — 93798 PHYS/QHP OP CAR RHAB W/ECG: CPT

## 2024-10-10 NOTE — PROGRESS NOTES
Patient arrived for cardiac rehab session. Pt reports to have eaten prior to exercise session.  The patient was on continuous EKG monitoring throughout the session. The patient tolerated exercise session well with no complaints. See cardiac rehab daily session report 10/10/2024.

## 2024-10-14 ENCOUNTER — CLINICAL SUPPORT (OUTPATIENT)
Facility: HOSPITAL | Age: 69
End: 2024-10-14
Attending: HOSPITALIST
Payer: MEDICARE

## 2024-10-14 DIAGNOSIS — I21.4 NSTEMI (NON-ST ELEVATION MYOCARDIAL INFARCTION): Primary | ICD-10-CM

## 2024-10-15 PROCEDURE — 93798 PHYS/QHP OP CAR RHAB W/ECG: CPT

## 2024-10-15 NOTE — PROGRESS NOTES
Patient arrived for cardiac rehab session. Pt reports to have eaten prior to exercise session.  The patient was on continuous EKG monitoring throughout the session. The patient tolerated exercise session well with no complaints. See cardiac rehab daily session report, 10/09/2024.

## 2024-10-15 NOTE — PROGRESS NOTES
Patient arrived for cardiac rehab session. Pt reports to have eaten prior to exercise session.  The patient was on continuous EKG monitoring throughout the session. The patient tolerated exercise session well with no complaints. See cardiac rehab daily session report 10/14/2024.

## 2024-10-16 ENCOUNTER — CLINICAL SUPPORT (OUTPATIENT)
Facility: HOSPITAL | Age: 69
End: 2024-10-16
Attending: HOSPITALIST
Payer: MEDICARE

## 2024-10-16 DIAGNOSIS — I21.3 ST ELEVATION MYOCARDIAL INFARCTION (STEMI), UNSPECIFIED ARTERY: Primary | ICD-10-CM

## 2024-10-16 PROCEDURE — 93798 PHYS/QHP OP CAR RHAB W/ECG: CPT

## 2024-10-16 NOTE — PROGRESS NOTES
Patient arrived for cardiac rehab session. Pt reports to have eaten prior to exercise session.  The patient was on continuous EKG monitoring throughout the session. The patient tolerated exercise session well with no complaints. See cardiac rehab daily session report, 10/16/2024.

## 2024-10-24 RX ORDER — METOPROLOL TARTRATE 25 MG/1
25 TABLET, FILM COATED ORAL 2 TIMES DAILY
Qty: 180 TABLET | Refills: 3 | Status: SHIPPED | OUTPATIENT
Start: 2024-10-24 | End: 2025-10-24

## 2024-10-28 ENCOUNTER — CLINICAL SUPPORT (OUTPATIENT)
Facility: HOSPITAL | Age: 69
End: 2024-10-28
Attending: HOSPITALIST
Payer: MEDICARE

## 2024-10-28 DIAGNOSIS — I21.4 NSTEMI (NON-ST ELEVATION MYOCARDIAL INFARCTION): Primary | ICD-10-CM

## 2024-10-28 PROCEDURE — 93798 PHYS/QHP OP CAR RHAB W/ECG: CPT

## 2024-10-30 ENCOUNTER — CLINICAL SUPPORT (OUTPATIENT)
Facility: HOSPITAL | Age: 69
End: 2024-10-30
Attending: HOSPITALIST
Payer: MEDICARE

## 2024-10-30 DIAGNOSIS — I21.4 NSTEMI (NON-ST ELEVATION MYOCARDIAL INFARCTION): Primary | ICD-10-CM

## 2024-10-30 PROCEDURE — 93798 PHYS/QHP OP CAR RHAB W/ECG: CPT

## 2024-10-31 ENCOUNTER — CLINICAL SUPPORT (OUTPATIENT)
Facility: HOSPITAL | Age: 69
End: 2024-10-31
Attending: HOSPITALIST
Payer: MEDICARE

## 2024-10-31 DIAGNOSIS — I21.4 NSTEMI (NON-ST ELEVATION MYOCARDIAL INFARCTION): Primary | ICD-10-CM

## 2024-10-31 PROCEDURE — 93798 PHYS/QHP OP CAR RHAB W/ECG: CPT

## 2024-11-04 ENCOUNTER — CLINICAL SUPPORT (OUTPATIENT)
Facility: HOSPITAL | Age: 69
End: 2024-11-04
Attending: HOSPITALIST
Payer: MEDICARE

## 2024-11-04 DIAGNOSIS — I21.4 NSTEMI (NON-ST ELEVATED MYOCARDIAL INFARCTION): Primary | ICD-10-CM

## 2024-11-04 NOTE — PROGRESS NOTES
Cardiac Rehab Individual Treatment Plan - Reassessment      Patient Name: Richard Soliz MRN: [id number]   : 1955   Age: 68 y.o.   Primary Diagnosis: NSTEMI      Psychosocial Assessment:   Living Arrangements: Lives with spouse  Family Support: children, family, and spouse  Self Reported: increase Effective Coping Skills  Displays: socializes with others, happiness, smiles often, and calmness  Medication: not applicable    Psychosocial Plan:   Goals:  Verbalizes coping mechanisms: In Progress  Maintain positive support system: In Progress  Maintain positive outlook: In Progress  Improve overall quality of life: Not Met:      Comments on Goal Progression:  Patient has made marked progress in the psychoareas listed above. Patient appears happy and healthier and reports feeling improvement in his daily activities.    Interventions:  Patient to Self Report Emotional Changes at Session Check In  Recommend Physical Activity  Recommend Attending Education Lectures  Notify MD: Declined  Program Referral: Declined  Pharmaceutical Intervention/Therapy: Declined  Other Needs: not applicable  Stage of Readiness to Change: Maintenance    Education:      Patient has been instructed to notify staff in the event that circumstances worsen.  Patient verbalizes understanding.    Other Core Components/Risk Factors Assessment:   RISK FACTORS:  diabetes, nutrition, hyperlipidemia, hypertension, sedentary lifestyle, exercise    Learning Barriers: None    Medication Compliance: has been compliant with taking medications    Other Core Components/Risk Factors Plan:   Goals:  Increase exercise tolerance: In Progress  Increase knowledge of CAD: In Progress  Decrease blood pressure: In Progress  Weight loss: In Progress  Demonstrate accurate pulse taking: In Progress  Identify and manage personal areas of stress: In Progress  Control diabetes by adjusting diet and exercise: In Progress  Learn more about healthy eating: In  Progress    Comments on Goal Progression:  Patient demonstrates a solid understanding of the objectives and is eager to participate in their care plan, showing readiness to change.The patient remains calm and optimistic, socializing well with both staff and peers. Overall, the patient is cheerful, reports feeling content, and shows no signs of distress.     Interventions:  Individual Education/ Counseling: Yes  Physician Referral: Declined    Education:    hypertension, verbalizes understanding; Date: 9/19/24  nutrition, verbalizes understanding; Date: 9/23/24  physical activity, verbalizes understanding; Date: 9/4/24         Education method adapted to patients education level and preferred method of learning.  Method: explanation  demonstration  handouts        Other Core Components/Hypertension Assessment:   BP Range: 126/62  BP at Goal: Yes  Patient reported symptoms: none    Medications:  Medication Prescribed? Adherent? Exception   Beta-blocker []Yes  []No  []Unknown []Yes  []No  []Unknown    ACEI/ARB []Yes  []No  []Unknown []Yes  []No  []Unknown    Calcium Channel Blocker []Yes  []No  []Unknown []Yes  []No  []Unknown    Diuretic []Yes  []No  []Unknown []Yes  []No  []Unknown        Other Core Components/Hypertension Plan:   Goals:  Blood Pressure <130/80    Comments on Goal Progression:  Patient's blood pressure has continued to reduce to 126/62 before exercise and not exceeding 146  systolic or 88 diastolic.     Interventions:  Encourage medication compliance  Encourage sodium reduction  Encourage weight loss  Recommend physical activity  Educate on contributory factors  Reduce stress, anxiety, anger, depression, and/or chronic pain  Encourage home blood pressure monitoring  Recommend daily weights  Enroll in Digital Hypertension Program    Education:    Hypertension; verbalizes understanding; Date: 9/19/24  Coronary Artery Disease; verbalizes understanding; Date: 10/03/24  Congestive Heart Failure;  verbalizes understanding; Date: 10/07/24  Medication II; verbalizes understanding; Date: 9/5/24             Does the patient have Heart Failure? No      Other Core Components/Tobacco Cessation Assessment:   Smoking Status: Former (>6 months)  Primary Tobacco Type: N/A  Tobacco Usage:  N/A  Smoking Cessation Barriers:  N/A  Stage of Readiness to Change: Maintenance    Other Core Components/Tobacco Cessation Plan:   Goals:  Maintain non-smoking status    Comments on Goal Progression:  Patient has continued to abstain from smoking.    Interventions:  Maintains non-smoking status    Education:    Coronary Artery Disease; verbalizes understanding; Date: 10/03/24          Comments:   Patient continues to make marked progress in cardiac rehab and has exceeded goals set forth above.

## 2024-11-06 ENCOUNTER — CLINICAL SUPPORT (OUTPATIENT)
Facility: HOSPITAL | Age: 69
End: 2024-11-06
Attending: HOSPITALIST
Payer: MEDICARE

## 2024-11-06 DIAGNOSIS — I21.4 NON-ST ELEVATION MYOCARDIAL INFARCTION (NSTEMI): Primary | ICD-10-CM

## 2024-11-06 PROCEDURE — 93798 PHYS/QHP OP CAR RHAB W/ECG: CPT

## 2024-11-06 NOTE — PROGRESS NOTES
Patient arrived for cardiac rehab session. Pt reports to have eaten prior to exercise session.  The patient was on continuous EKG monitoring throughout the session. The patient tolerated exercise session well with no complaints. See cardiac rehab daily session report, 11/6/24.

## 2024-11-07 ENCOUNTER — CLINICAL SUPPORT (OUTPATIENT)
Facility: HOSPITAL | Age: 69
End: 2024-11-07
Attending: HOSPITALIST
Payer: MEDICARE

## 2024-11-07 DIAGNOSIS — I21.4 NSTEMI (NON-ST ELEVATION MYOCARDIAL INFARCTION): Primary | ICD-10-CM

## 2024-11-08 PROCEDURE — 93798 PHYS/QHP OP CAR RHAB W/ECG: CPT

## 2024-11-08 NOTE — PROGRESS NOTES
Patient arrived for cardiac rehab session. Pt reports to have eaten prior to exercise session.  The patient was on continuous EKG monitoring throughout the session. The patient tolerated exercise session well with no complaints. Please see cardiac rehab daily session report from:  11/07/2024

## 2024-11-11 ENCOUNTER — CLINICAL SUPPORT (OUTPATIENT)
Facility: HOSPITAL | Age: 69
End: 2024-11-11
Attending: HOSPITALIST
Payer: MEDICARE

## 2024-11-11 DIAGNOSIS — I21.4 NSTEMI (NON-ST ELEVATION MYOCARDIAL INFARCTION): Primary | ICD-10-CM

## 2024-11-11 PROCEDURE — 93798 PHYS/QHP OP CAR RHAB W/ECG: CPT

## 2024-11-11 NOTE — PROGRESS NOTES
Patient arrived for cardiac rehab session. Pt reports to have eaten prior to exercise session.  The patient was on continuous EKG monitoring throughout the session. The patient tolerated exercise session well with no complaints. See cardiac rehab daily session report 11/11/24.

## 2024-11-14 ENCOUNTER — APPOINTMENT (OUTPATIENT)
Dept: RADIOLOGY | Facility: CLINIC | Age: 69
End: 2024-11-14
Attending: NURSE PRACTITIONER
Payer: MEDICARE

## 2024-11-14 ENCOUNTER — OFFICE VISIT (OUTPATIENT)
Dept: FAMILY MEDICINE | Facility: CLINIC | Age: 69
End: 2024-11-14
Payer: MEDICARE

## 2024-11-14 ENCOUNTER — CLINICAL SUPPORT (OUTPATIENT)
Facility: HOSPITAL | Age: 69
End: 2024-11-14
Attending: HOSPITALIST
Payer: MEDICARE

## 2024-11-14 ENCOUNTER — PATIENT MESSAGE (OUTPATIENT)
Dept: FAMILY MEDICINE | Facility: CLINIC | Age: 69
End: 2024-11-14
Payer: MEDICARE

## 2024-11-14 VITALS
OXYGEN SATURATION: 95 % | WEIGHT: 168 LBS | DIASTOLIC BLOOD PRESSURE: 60 MMHG | TEMPERATURE: 98 F | HEIGHT: 64 IN | SYSTOLIC BLOOD PRESSURE: 122 MMHG | BODY MASS INDEX: 28.68 KG/M2 | HEART RATE: 84 BPM | RESPIRATION RATE: 18 BRPM

## 2024-11-14 DIAGNOSIS — Z23 NEED FOR VACCINATION: ICD-10-CM

## 2024-11-14 DIAGNOSIS — Z87.891 HISTORY OF SMOKING: ICD-10-CM

## 2024-11-14 DIAGNOSIS — I21.4 NSTEMI (NON-ST ELEVATION MYOCARDIAL INFARCTION): Primary | ICD-10-CM

## 2024-11-14 DIAGNOSIS — E11.9 DIABETES MELLITUS WITHOUT COMPLICATION: ICD-10-CM

## 2024-11-14 DIAGNOSIS — I25.5 ISCHEMIC CARDIOMYOPATHY: ICD-10-CM

## 2024-11-14 DIAGNOSIS — J44.9 CHRONIC OBSTRUCTIVE PULMONARY DISEASE, UNSPECIFIED COPD TYPE: Primary | ICD-10-CM

## 2024-11-14 DIAGNOSIS — J44.9 CHRONIC OBSTRUCTIVE PULMONARY DISEASE, UNSPECIFIED COPD TYPE: ICD-10-CM

## 2024-11-14 DIAGNOSIS — I10 HYPERTENSION, UNSPECIFIED TYPE: ICD-10-CM

## 2024-11-14 PROBLEM — N41.0 ACUTE PROSTATITIS: Status: RESOLVED | Noted: 2021-10-29 | Resolved: 2024-11-14

## 2024-11-14 LAB
CREAT UR-MCNC: 42 MG/DL (ref 23–375)
MICROALBUMIN UR-MCNC: <5 MG/DL (ref 0–2.8)
MICROALBUMIN/CREAT RATIO PNL UR: <119 MG/G (ref 0–30)
TSH SERPL DL<=0.005 MIU/L-ACNC: 1.53 UIU/ML (ref 0.35–4.94)

## 2024-11-14 PROCEDURE — G0008 ADMIN INFLUENZA VIRUS VAC: HCPCS | Mod: ,,, | Performed by: NURSE PRACTITIONER

## 2024-11-14 PROCEDURE — 99214 OFFICE O/P EST MOD 30 MIN: CPT | Mod: ,,, | Performed by: NURSE PRACTITIONER

## 2024-11-14 PROCEDURE — 71046 X-RAY EXAM CHEST 2 VIEWS: CPT | Mod: 26,,, | Performed by: RADIOLOGY

## 2024-11-14 PROCEDURE — 71046 X-RAY EXAM CHEST 2 VIEWS: CPT | Mod: TC,RHCUB | Performed by: NURSE PRACTITIONER

## 2024-11-14 PROCEDURE — 84443 ASSAY THYROID STIM HORMONE: CPT | Mod: ,,, | Performed by: CLINICAL MEDICAL LABORATORY

## 2024-11-14 PROCEDURE — 93798 PHYS/QHP OP CAR RHAB W/ECG: CPT

## 2024-11-14 PROCEDURE — 82043 UR ALBUMIN QUANTITATIVE: CPT | Mod: ,,, | Performed by: CLINICAL MEDICAL LABORATORY

## 2024-11-14 PROCEDURE — 80053 COMPREHEN METABOLIC PANEL: CPT | Mod: ,,, | Performed by: CLINICAL MEDICAL LABORATORY

## 2024-11-14 PROCEDURE — 90653 IIV ADJUVANT VACCINE IM: CPT | Mod: ,,, | Performed by: NURSE PRACTITIONER

## 2024-11-14 PROCEDURE — 82570 ASSAY OF URINE CREATININE: CPT | Mod: ,,, | Performed by: CLINICAL MEDICAL LABORATORY

## 2024-11-14 RX ORDER — AZITHROMYCIN 250 MG/1
TABLET, FILM COATED ORAL
Qty: 6 TABLET | Refills: 0 | Status: SHIPPED | OUTPATIENT
Start: 2024-11-14

## 2024-11-14 RX ORDER — INSULIN DEGLUDEC 100 U/ML
10 INJECTION, SOLUTION SUBCUTANEOUS NIGHTLY
COMMUNITY

## 2024-11-14 NOTE — PROGRESS NOTES
Subjective     Patient ID: Richard Soliz is a 68 y.o. male.    Chief Complaint: Shortness of Breath (Hi-dose flu vaccine given in left deltoid.)    Pt presents for a follow-up for copd. He has not been using the advair daily inhaler.   Protective Sensation (w/ 10 gram monofilament):  Right: Intact  Left: Intact    Visual Inspection:  Normal -  Bilateral    Pedal Pulses:   Right: Present  Left: Present    Posterior Tibialis Pulses:   Right:Present  Left: Present         Review of Systems   Constitutional:  Negative for activity change, appetite change, fatigue and fever.   HENT:  Negative for nasal congestion, nosebleeds, postnasal drip, rhinorrhea, sinus pressure/congestion, sneezing and sore throat.    Eyes:  Negative for pain and itching.   Respiratory:  Positive for shortness of breath. Negative for cough, chest tightness, wheezing and stridor.    Cardiovascular:  Negative for chest pain.   Gastrointestinal:  Negative for abdominal pain.   Genitourinary:  Negative for dysuria.   Musculoskeletal:  Negative for back pain.   Neurological:  Negative for dizziness and headaches.   Psychiatric/Behavioral:  Negative for behavioral problems and confusion.           Objective     Physical Exam  Vitals and nursing note reviewed.   Constitutional:       Appearance: Normal appearance.   Cardiovascular:      Rate and Rhythm: Normal rate and regular rhythm.      Heart sounds: Normal heart sounds.   Pulmonary:      Effort: Pulmonary effort is normal.      Breath sounds: Normal breath sounds.   Musculoskeletal:         General: Normal range of motion.   Neurological:      Mental Status: He is alert and oriented to person, place, and time.   Psychiatric:         Mood and Affect: Mood normal.         Behavior: Behavior normal.            Assessment and Plan     1. Chronic obstructive pulmonary disease, unspecified COPD type  -     X-Ray Chest PA And Lateral; Future; Expected date: 11/14/2024  -     Ambulatory referral/consult to  Pulmonology; Future; Expected date: 11/21/2024    2. Need for vaccination  -     influenza (adjuvanted) (Fluad) 45 mcg/0.5 mL IM vaccine (> or = 64 yo) 0.5 mL    3. Diabetes mellitus without complication  -     Comprehensive Metabolic Panel; Future; Expected date: 11/14/2024  -     TSH; Future; Expected date: 11/14/2024  -     Microalbumin/Creatinine Ratio, Urine; Future; Expected date: 11/14/2024    4. History of smoking  -      AAA Screening; Future; Expected date: 11/14/2024    5. Hypertension, unspecified type    6. Ischemic cardiomyopathy  Overview:  New diagnosis dilated cardiomyopathy, EF 25%, will continue to optimize medical tx.           Will call pt with lab and xray results. Referral to pulmonology for copd and sob. Use the daily inh as directed. Will obtain last eye exam from Dr Anthony.          Follow up in about 6 months (around 5/14/2025).

## 2024-11-14 NOTE — PROGRESS NOTES
Patient arrived for cardiac rehab session. Pt reports to have eaten prior to exercise session.  The patient was on continuous EKG monitoring throughout the session. The patient tolerated exercise session well with no complaints. Please see cardiac rehab daily session report from:  11/14/2024

## 2024-11-15 ENCOUNTER — PATIENT MESSAGE (OUTPATIENT)
Dept: FAMILY MEDICINE | Facility: CLINIC | Age: 69
End: 2024-11-15
Payer: MEDICARE

## 2024-11-15 LAB
ALBUMIN SERPL BCP-MCNC: 3.6 G/DL (ref 3.4–4.8)
ALBUMIN/GLOB SERPL: 1.3 {RATIO}
ALP SERPL-CCNC: 117 U/L (ref 40–150)
ALT SERPL W P-5'-P-CCNC: 54 U/L (ref 0–55)
ANION GAP SERPL CALCULATED.3IONS-SCNC: 17 MMOL/L (ref 7–16)
AST SERPL W P-5'-P-CCNC: 52 U/L (ref 5–34)
BILIRUB SERPL-MCNC: 0.6 MG/DL
BUN SERPL-MCNC: 16 MG/DL (ref 8–26)
BUN/CREAT SERPL: 14 (ref 6–20)
CALCIUM SERPL-MCNC: 9 MG/DL (ref 8.8–10)
CHLORIDE SERPL-SCNC: 106 MMOL/L (ref 98–107)
CO2 SERPL-SCNC: 21 MMOL/L (ref 23–31)
CREAT SERPL-MCNC: 1.11 MG/DL (ref 0.72–1.25)
EGFR (NO RACE VARIABLE) (RUSH/TITUS): 73 ML/MIN/1.73M2
GLOBULIN SER-MCNC: 2.7 G/DL (ref 2–4)
GLUCOSE SERPL-MCNC: 189 MG/DL (ref 82–115)
POTASSIUM SERPL-SCNC: 4.7 MMOL/L (ref 3.5–5.1)
PROT SERPL-MCNC: 6.3 G/DL (ref 5.8–7.6)
SODIUM SERPL-SCNC: 139 MMOL/L (ref 136–145)

## 2024-11-18 ENCOUNTER — PATIENT OUTREACH (OUTPATIENT)
Facility: HOSPITAL | Age: 69
End: 2024-11-18
Payer: MEDICARE

## 2024-11-18 ENCOUNTER — CLINICAL SUPPORT (OUTPATIENT)
Facility: HOSPITAL | Age: 69
End: 2024-11-18
Attending: HOSPITALIST
Payer: MEDICARE

## 2024-11-18 DIAGNOSIS — Z95.1 PRESENCE OF AORTOCORONARY BYPASS GRAFT: Primary | ICD-10-CM

## 2024-11-18 DIAGNOSIS — R73.9 BLOOD GLUCOSE ELEVATED: ICD-10-CM

## 2024-11-18 DIAGNOSIS — E11.9 DIABETES: ICD-10-CM

## 2024-11-18 DIAGNOSIS — I21.4 NSTEMI (NON-ST ELEVATION MYOCARDIAL INFARCTION): Primary | ICD-10-CM

## 2024-11-18 DIAGNOSIS — E78.00 HYPERCHOLESTEROLEMIA: ICD-10-CM

## 2024-11-18 PROCEDURE — 93798 PHYS/QHP OP CAR RHAB W/ECG: CPT

## 2024-11-18 NOTE — LETTER
AUTHORIZATION FOR RELEASE OF   CONFIDENTIAL INFORMATION    Dear Dr. Anthony,    We are seeing Richard Soliz, date of birth 1955, in the clinic at [facility]. Valentina Mcintyre FNP is the patient's PCP. Richard Soliz has an outstanding lab/procedure at the time we reviewed his chart. In order to help keep his health information updated, he has authorized us to request the following medical record(s):        (  )  MAMMOGRAM                                      (  )  COLONOSCOPY      (  )  PAP SMEAR                                          (  )  OUTSIDE LAB RESULTS     (  )  DEXA SCAN                                          ( x )  EYE EXAM            (  )  FOOT EXAM                                          (  )  ENTIRE RECORD     (  )  OUTSIDE IMMUNIZATIONS                 (  )  _______________         Please fax records to Tosin García LPN Care Coordinator at [phone].      If you have any questions, please contact Tosin at [phone].           Patient Name: Richard Soliz  : 1955  Patient Phone #: [phone]                Rihcard Soliz  MRN: [id number]  : 1955  Age: 68 y.o.  Sex: male         Patient/Legal Guardian Signature  This signature was collected at 2024    self       _______________________________   Printed Name/Relationship to Patient      Consent for Examination and Treatment: I hereby authorize the providers and employees of [facility] ([facility]) to provide medical treatment/services which includes, but is not limited to, performing and administering tests and diagnostic procedures that are deemed necessary, including, but not limited to, imaging examinations, blood tests and other laboratory procedures as may be required by the hospital, clinic, or may be ordered by my physician(s) or persons working under the general and/or special instructions of my physician(s).      I understand and agree that this consent covers all  authorized persons, including but not limited to physicians, residents, nurse practitioners, physicians' assistants, specialists, consultants, student nurses, and independently contracted physicians, who are called upon by the physician in charge, to carry out the diagnostic procedures and medical or surgical treatment.     I hereby authorize [facility] to retain or dispose of any specimens or tissue, should there be such remaining from any test or procedure.     I hereby authorize and give consent for [facility] providers and employees to take photographs, images or videotapes of such diagnostic, surgical or treatment procedures of Patient as may be required by [facility] or as may be ordered by a physician. I further acknowledge and agree that [facility] may use cameras or other devices for patient monitoring.     I am aware that the practice of medicine is not an exact science, and I acknowledge that no guarantees have been made to me as to the outcome of any tests, procedures or treatment.     Authorization for Release of Information: I understand that my insurance company and/or their agents may need information necessary to make determinations about payment/reimbursement. I hereby provide authorization to release to all insurance companies, their successors, assignees, other parties with whom they may have contracted, or others acting on their behalf, that are involved with payment for any hospital and/or clinic charges incurred by the patient, any information that they request and deem necessary for payment/reimbursement, and/or quality review.  I further authorize the release of my health information to physicians or other health care practitioners on staff who are involved in my health care now and in the future, and to other health care providers, entities, or institutions for the purpose of my continued care and treatment, including referrals.     REGISTRATION AUTHORIZATION  Form No. 29207 (Rev. 3/25/2024)    Page 1  of 3                       Medicare Patient's Certification and Authorization to Release Information and Payment Request:  I certify that the information given by me in applying for payment under Title XVIII of the Social Security Act is correct. I authorize any navarro of medical or other information about me to release to the Social SecurityAdministration, or its intermediaries or carriers, any information needed for this or a related Medicare claim. I request that payment of authorized benefits be made on my behalf.     Assignment of Insurance Benefits:   I hereby authorize any and all insurance companies, health plans, defined   benefit plans, health insurers or any entity that is or may be responsible for payment of my medical expenses to pay all hospital and medical benefits now due, and to become due and payable to me under any hospital benefits, sick benefits, injury benefits or any other benefit for services rendered to me, including Major Medical Benefits, direct to [facility] and all independently contracted physicians. I assign any and all rights that I may have against any and all insurance companies, health plans, defined benefit plans, health insurers or any entity that is or may be responsible for payment of my medical expenses, including, but not limited to any right to appeal a denial of a claim, any right to bring any action, lawsuit, administrative proceeding, or other cause of action on my behalf. I specifically assign my right to pursue litigation against any and all insurance companies, health plans, defined benefit plans, health insurers or any entity that is or may be responsible for payment of my medical expenses based upon a refusal to pay charges.            E. Valuables: It is understood and agreed that [facility] is not liable for the damage to or loss of any money, jewelry,   documents, dentures, eye glasses, hearing aids, prosthetics, or other property of value.     F. Computer Equipment: I  understand and agree that should I choose to use computer equipment owned by [facility] or if I choose to access the Internet via [facility]s network, I do so at my own risk. [facility] is not responsible for any damage to my computer equipment or to any damages of any type that might arise from my loss of equipment or data.     G. Acceptance of Financial Responsibility:  I agree that in consideration of the services and   supplies that have been   or will be furnished to the patient, I am hereby obligated to pay all charges made for or on the account of the patient according to the standard rates (in effect at the time the services and supplies are delivered) established by [facility], including its Patient Financial Assistance Policy to the extent it is applicable. I understand that I am responsible for all charges, or portions thereof, not covered by insurance or other sources. Patient refunds will be distributed only after balances at all [facility] facilities are paid.     H. Communication Authorization:  I hereby authorize [facility] and its representatives, along with any billing service   or  who may work on their behalf, to contact me on   my cell phone and/or home phone using pre- recorded messages, artificial voice messages, automatic telephone dialing devices or other computer assisted technology, or by electronic      mail, text messaging, or by any other form of electronic communication. This includes, but is not limited to, appointment reminders, yearly physical exam reminders, preventive care reminders, patient campaigns, welcome calls, and calls about account balances on my account or any account on which I am listed as a guarantor. I understand I have the right to opt out of these communications at any time.      Relationship  Between  Facility and  Provider:      I understand that some, but not all, providers furnishing services to the patient are not employees or agents of [facility]. The patient is  under the care and supervision of his/her attending physician, and it is the responsibility of the facility and its nursing staff to carry out the instructions of such physicians. It is the responsibility of the patient's physician/designee to obtain the patient's informed consent, when required, for medical or surgical treatment, special diagnostic or therapeutic procedures, or hospital services rendered for the patient under the special instructions of the physician/designee.           REGISTRATION AUTHORIZATION  Form No. 28632 (Rev. 3/25/2024)    Page 2 of 3                       Immunizations: [facility] shares immunization information with state sponsored health departments to help you and your doctor keep track of your immunization records. By signing, you consent to have this information shared with the health department in your state:                                [place] - LINKS ([place] Immunization Network for Kids Statewide)                                [place] - MIIX ([place] Immunization Information eXchange)                                [place] - ImmPRINT (Immunization Patient Registry with Integrated Technology)     TERM: This authorization is valid for this and subsequent care/treatment I receive at [facility] and will remain valid unless/until revoked in writing by me.     [facility]: As used in this document, [facility] means all [facility] owned and managed facilities, including, but not limited to, all health centers, surgery centers, clinics, urgent care centers, and hospitals.         [facility] complies with applicable Federal civil rights laws and does not discriminate on the basis of race, color, national origin, age, disability, or sex.  ATENCIÓN: si habla español, tiene a sanchez disposición servicios gratuitos de asistencia lingüística. James talamantes [phone].  [facility] Ý: N?u b?n nói Ti?ng Vi?t, có các d?ch v? h? tr? ngôn ng? mi?n phí dành cho b?n. G?i s?  [phone].        REGISTRATION AUTHORIZATION  Form No. 15903 (Rev. 3/25/2024)   Page 3 of 3     Patient

## 2024-11-18 NOTE — PROGRESS NOTES
Population Health Chart Review & Patient Outreach Details    Updates Requested / Reviewed:  [x]  Care Team Updated  [x]   Reviewed    Health Maintenance Topics Addressed and Outreach Outcomes / Actions Taken:  Diabetic Eye Exam [x] KIMBERLY sent to [facility].

## 2024-11-18 NOTE — PROGRESS NOTES
Patient arrived for cardiac rehab session. Pt reports to have eaten prior to exercise session.  The patient was on continuous EKG monitoring throughout the session. The patient tolerated exercise session well with no complaints. See cardiac rehab daily session report 11/18/24.

## 2024-11-19 ENCOUNTER — OFFICE VISIT (OUTPATIENT)
Dept: PULMONOLOGY | Facility: CLINIC | Age: 69
End: 2024-11-19
Payer: MEDICARE

## 2024-11-19 VITALS
OXYGEN SATURATION: 95 % | SYSTOLIC BLOOD PRESSURE: 118 MMHG | WEIGHT: 168 LBS | RESPIRATION RATE: 16 BRPM | DIASTOLIC BLOOD PRESSURE: 72 MMHG | BODY MASS INDEX: 28.68 KG/M2 | HEART RATE: 82 BPM | HEIGHT: 64 IN

## 2024-11-19 DIAGNOSIS — J45.40 MODERATE PERSISTENT ASTHMA WITHOUT COMPLICATION: Primary | ICD-10-CM

## 2024-11-19 DIAGNOSIS — J44.9 CHRONIC OBSTRUCTIVE PULMONARY DISEASE, UNSPECIFIED COPD TYPE: ICD-10-CM

## 2024-11-19 DIAGNOSIS — J18.9 PNEUMONIA OF LEFT LOWER LOBE DUE TO INFECTIOUS ORGANISM: ICD-10-CM

## 2024-11-19 DIAGNOSIS — J98.6 ACQUIRED ELEVATED DIAPHRAGM: ICD-10-CM

## 2024-11-19 PROCEDURE — 99999 PR PBB SHADOW E&M-EST. PATIENT-LVL V: CPT | Mod: PBBFAC,,, | Performed by: STUDENT IN AN ORGANIZED HEALTH CARE EDUCATION/TRAINING PROGRAM

## 2024-11-19 PROCEDURE — 99204 OFFICE O/P NEW MOD 45 MIN: CPT | Mod: S$PBB,,, | Performed by: STUDENT IN AN ORGANIZED HEALTH CARE EDUCATION/TRAINING PROGRAM

## 2024-11-19 PROCEDURE — 99215 OFFICE O/P EST HI 40 MIN: CPT | Mod: PBBFAC | Performed by: STUDENT IN AN ORGANIZED HEALTH CARE EDUCATION/TRAINING PROGRAM

## 2024-11-19 RX ORDER — FLUTICASONE FUROATE, UMECLIDINIUM BROMIDE AND VILANTEROL TRIFENATATE 100; 62.5; 25 UG/1; UG/1; UG/1
1 POWDER RESPIRATORY (INHALATION) DAILY
Qty: 28 EACH | Refills: 11 | Status: SHIPPED | OUTPATIENT
Start: 2024-11-19

## 2024-11-19 NOTE — ASSESSMENT & PLAN NOTE
I agree with his antibiotic course.  We will obtain CT chest given altered anatomy and history of lung scarring to demonstrate resolution of his pneumonia.  This is to be scheduled in 2-3 months.

## 2024-11-19 NOTE — ASSESSMENT & PLAN NOTE
S/p CABG.  We will obtain ABG as part of workup.  And consider sleep medicine referral on follow-up.

## 2024-11-19 NOTE — PROGRESS NOTES
[facility]  Pulmonology  NEW VISIT     Patient Name:  Richard Soliz  Primary Care Provider: Valentina Mcintyre FNP  Date of Service: 11/19/2024   Reason for Referral: J44.9 (ICD-10-CM) - Chronic obstructive pulmonary disease, unspecified COPD type       Chief Complaint: Shortness of breath    SUBJECTIVE   HPI:  Richard Soliz is a 68 y.o. male with CAD s/p CABG (06/2024), DIIM, HTN and asthma who presents today upon referral with complaints of shortness of breath.  He is accompanied by his wife who assists with history.    Martínez reports having shortness of breath that is present at times and he lays down.  He has had a cough that is at times productive but mostly nonproductive and sounds wet.  He states that he has had asthma since his young adulthood that required hospitalization, however, he has not required intubation in his life.  He had frequent lung infections including bronchitis in his childhood that required repeat treatment with antibiotics.  He has previously been told that he has a scar on his left lung from his previous infections.  He smoked previously about 2 packs and quit over 20 years ago.  He presented to PCP last week with chest x-ray for which he was started on an antibiotic course for treatment of a left-sided pneumonia.  He feels improved since starting the antibiotics.  He was previously on Advair and transitioned to Anoro Ellipta and states that he prefers the Ellipta device as he can feel that he is breathing in the medication.  He is currently participating with cardiac rehabilitation and feels like his breathing improves after exercise. He had COVID 08/2021 characterized by fever and generalized malaise no respiratory symptoms.  He was managed outpatient.  We reviewed his chest x-rays to date.      Past Medical History:   Diagnosis Date    Acquired asymmetry of prostate     Acute prostatitis 10/29/2021    Acute prostatitis 10/29/2021    Allergic rhinitis     Asthma     BPH  with obstruction/lower urinary tract symptoms 10/29/2021    flomax [facility]     Community acquired pneumonia of left lower lobe of lung     COPD (chronic obstructive pulmonary disease)     Essential hypertension     GERD (gastroesophageal reflux disease)     Hyperlipidemia     Personal history of colonic polyps 2017    repeat colonoscopy in 3 years    Type 2 diabetes mellitus     Vitamin D deficiency        Past Surgical History:   Procedure Laterality Date    COLONOSCOPY W/ BIOPSIES  2017    repeat in 3 years    CORONARY ANGIOGRAPHY INCLUDING BYPASS GRAFTS WITH CATHETERIZATION OF LEFT HEART N/A 2024    Procedure: ANGIOGRAM, CORONARY, INCLUDING BYPASS GRAFT, WITH LEFT HEART CATHETERIZATION;  Surgeon: Avery Cooper MD;  Location: [facility] CATH LAB;  Service: Cardiology;  Laterality: N/A;    nose surgery for broken nose as a child      right carpal tunnel surgery  2021    TONSILLECTOMY AND ADENOIDECTOMY         Family History   Problem Relation Name Age of Onset    Diabetes Mother      Hyperlipidemia Mother      Hypertension Mother      Diabetes Father      Heart failure Father      Hypertension Father      Cancer Father      Multiple sclerosis Sister      Hypertension Brother      Nephrolithiasis Brother          Social History     Socioeconomic History    Marital status:      Spouse name: cecelia    Number of children: 1    Years of education: 12    Highest education level: 12th grade   Occupational History    Occupation:    Tobacco Use    Smoking status: Former     Current packs/day: 0.00     Average packs/day: 2.0 packs/day for 42.0 years (84.0 ttl pk-yrs)     Types: Cigarettes     Start date:      Quit date: 2019     Years since quittin.8    Smokeless tobacco: Never    Tobacco comments:     quit smoking 26 years ago   Substance and Sexual Activity    Alcohol use: Not Currently    Drug use: Never    Sexual activity: Not Currently   Social History Narrative     "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     Social Drivers of Health     Financial Resource Strain: Low Risk  (5/26/2024)    Overall Financial Resource Strain (CARDIA)     Difficulty of Paying Living Expenses: Not hard at all   Food Insecurity: No Food Insecurity (5/26/2024)    Hunger Vital Sign     Worried About Running Out of Food in the Last Year: Never true     Ran Out of Food in the Last Year: Never true   Transportation Needs: No Transportation Needs (5/26/2024)    TRANSPORTATION NEEDS     Transportation : No   Physical Activity: Insufficiently Active (5/26/2024)    Exercise Vital Sign     Days of Exercise per Week: 3 days     Minutes of Exercise per Session: 20 min   Stress: No Stress Concern Present (5/26/2024)    [employer] - Occupational Stress Questionnaire     Feeling of Stress : Not at all   Housing Stability: Low Risk  (5/26/2024)    Housing Stability Vital Sign     Unable to Pay for Housing in the Last Year: No     Homeless in the Last Year: No       Social History     Social History Narrative    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       Review of patient's allergies indicates:   Allergen Reactions    Morphine         Medications: Medications reviewed to include over the counter medications.    Review of Systems: A focused ROS was completed and found to be negative except for that mentioned above.      OBJECTIVE   PHYSICAL EXAM:  Vitals:    11/19/24 0809   BP: 118/72   BP Location: Left arm   Patient Position: Sitting   Pulse: 82   Resp: 16   SpO2: 95%   Weight: 76.2 kg (167 lb 15.9 oz)   Height: 5' 4" (1.626 m)        GENERAL: NAD  HEENT: normocephalic, non-icteric conjunctivae, moist oral mucosa  LYMPHATIC: no lymphadenopathy of neck  RESPIRATORY: clear to auscultation, no wheezing, rales or rhonchi  CARDIOVASCULAR: Regular rate and rhythm, no murmurs rubs or " gallops  SKIN: no rash, jaundice, ecchymosis or ulcers  MUSCULOSKELETAL: No clubbing or cyanosis; no pedal edema  NEUROLOGIC: AO ×3, no gross deficits  PSYCH: Normal mood and affect    LABS:  Lab studies reviewed and notable for 21, SCr 1.11 (11/2024), H/H 14.8/48.8, SEos 370 (08/2024)    IMAGING:  Imaging reviewed and notable for CXR 11/2024 with clear right lung fields, left hemidiaphragm elevation, sternotomy wires present, no pleural effusion.  CXR 05/2024 with clear lung fields bilaterally, normal bilateral hemidiaphragm excursion, no pleural effusion    TTE:  05/2024: LVEF 45-50%, normal RV size, TAPSE 1.69, normal LA size, normal RA size, mild MR, trace TR    LUNG FUNCTION TESTING: None available to review or report      ASSESSMENT & PLAN     1. Moderate persistent asthma without complication  Assessment & Plan:  69 yo M with prior nicotine dependence (quit 20 years ago) and lifelong history of asthma presents to establish care with complaints of shortness of breath present at rest.  He recently had CABG in 06/2024 with serial imaging before and after demonstrating a left hemidiaphragm elevation, likely a complication of his surgery.  This left hemidiaphragm persists and is present 11/2024.  Labs notable for os 370 with a personal history of allergies.  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  Plan for evaluation and treatment as follows:   - stop Anoro and up step therapy to Trelegy 100 daily    -- inhaler teaching provided during this encounter   - continue albuterol as needed   - obtain PFT and 6MWT  - continue montelukast and cetirizine    Orders:  -     Complete PFT w/ bronchodilator; Future  -     Stress test, pulmonary; Future; Expected date: 11/19/2024  -     fluticasone-umeclidin-vilanter (TRELEGY ELLIPTA) 100-62.5-25 mcg DsDv; Inhale 1 puff into the lungs once daily.  Dispense: 28 each; Refill: 11  -     Arterial blood gas; Future    2. Pneumonia of left lower lobe  due to infectious organism  Assessment & Plan:  I agree with his antibiotic course.  We will obtain CT chest given altered anatomy and history of lung scarring to demonstrate resolution of his pneumonia.  This is to be scheduled in 2-3 months.    Orders:  -     CT Chest Without Contrast; Future; Expected date: 11/19/2024    3. Chronic obstructive pulmonary disease, unspecified COPD type  -     Ambulatory referral/consult to Pulmonology    4. Acquired elevated diaphragm  Assessment & Plan:  S/p CABG.  We will obtain ABG as part of workup.  And consider sleep medicine referral on follow-up.    Orders:  -     Arterial blood gas; Future           Follow up in about 2 months (around 1/19/2025).      Case was discussed with patient; all questions were answered to patient's satisfaction and patient verbalized understanding.       Selam Phelan MD  Pulmonary Medicine  [facility]  Phone: [phone]

## 2024-11-19 NOTE — ASSESSMENT & PLAN NOTE
67 yo M with prior nicotine dependence (quit 20 years ago) and lifelong history of asthma presents to establish care with complaints of shortness of breath present at rest.  He recently had CABG in 06/2024 with serial imaging before and after demonstrating a left hemidiaphragm elevation, likely a complication of his surgery.  This left hemidiaphragm persists and is present 11/2024.  Labs notable for SEos 370 with a personal history of allergies.  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  Plan for evaluation and treatment as follows:   - stop Anoro and up step therapy to Trelegy 100 daily    -- inhaler teaching provided during this encounter   - continue albuterol as needed   - obtain PFT and 6MWT  - continue montelukast and cetirizine

## 2024-11-21 ENCOUNTER — CLINICAL SUPPORT (OUTPATIENT)
Facility: HOSPITAL | Age: 69
End: 2024-11-21
Attending: HOSPITALIST
Payer: MEDICARE

## 2024-11-21 DIAGNOSIS — I21.4 NON-ST ELEVATION MYOCARDIAL INFARCTION (NSTEMI): Primary | ICD-10-CM

## 2024-11-22 ENCOUNTER — PATIENT MESSAGE (OUTPATIENT)
Dept: FAMILY MEDICINE | Facility: CLINIC | Age: 69
End: 2024-11-22
Payer: MEDICARE

## 2024-11-22 ENCOUNTER — HOSPITAL ENCOUNTER (OUTPATIENT)
Dept: RADIOLOGY | Facility: HOSPITAL | Age: 69
Discharge: HOME | End: 2024-11-22
Attending: NURSE PRACTITIONER
Payer: MEDICARE

## 2024-11-22 DIAGNOSIS — Z87.891 HISTORY OF SMOKING: ICD-10-CM

## 2024-11-22 PROCEDURE — 76706 US ABDL AORTA SCREEN AAA: CPT | Mod: TC

## 2024-11-22 PROCEDURE — 93798 PHYS/QHP OP CAR RHAB W/ECG: CPT

## 2024-11-22 NOTE — PROGRESS NOTES
Occupational Therapy Progress Note     Patient Name: Adelina Dyer  Today's Date: 10/7/2024  Problem List  Principal Problem:    Dizziness  Active Problems:    Essential hypertension    CAD (coronary artery disease)    Hypothyroidism    Chronic kidney disease, stage 3 (HCC)    Type 2 diabetes mellitus (HCC)    Abnormal CT scan       10/07/24 1134   OT Last Visit   OT Visit Date 10/07/24  (Monday)   Note Type   Note Type Treatment  (tx session [id number])   Pain Assessment   Pain Assessment Tool 0-10   Pain Score 5   Pain Location/Orientation Location: Head   Effect of Pain on Daily Activities limits pace, activity tolerance during ADLs   Patient's Stated Pain Goal No pain   Hospital Pain Intervention(s) Repositioned;Ambulation/increased activity;Emotional support   Restrictions/Precautions   Weight Bearing Precautions Per Order No   Other Precautions Chair Alarm;Bed Alarm;Fall Risk;Pain   Lifestyle   Autonomy Pt reports I and active lifestyle at baseline w/ out use of AD   Reciprocal Relationships Supportive daughter plans to stay w/ pt at DC   Service to Others Pt reports retired  and then worked at [employer]   Intrinsic Gratification Pt reports enjoying word puzzles, bowling, Bingo and reading mysteries   ADL   Where Assessed Edge of bed  (vs bathroom)   Eating Assistance 7  Independent   Eating Deficit Setup   Grooming Assistance 5  Supervision/Setup   Grooming Deficit Setup;Supervision/safety;Increased time to complete  (standing at sink to wash / dry hand after toileting)   UB Dressing Assistance 6  Modified independent   UB Dressing Deficit Setup;Increased time to complete   UB Dressing Comments seated at EOB   LB Dressing Assistance 5  Supervision/Setup   LB Dressing Deficit Setup;Requires assistive device for steadying;Supervision/safety;Increased time to complete   LB Dressing Comments Edcuated pt on tech to thread clothing while seated while limited foward flexion trunk / bending.  Patient arrived for cardiac rehab session. Pt reports to have eaten prior to exercise session.  The patient was on continuous EKG monitoring throughout the session. The patient tolerated exercise session well with no complaints. See cardiac rehab daily session report 11/21/24.     "  Toileting Assistance  5  Supervision/Setup   Toileting Deficit Setup;Supervison/safety;Increased time to complete;Grab bar use  (R Grab bar use)   Toileting Comments voided seated on toilet in bathroom   Bed Mobility   Supine to Sit 5  Supervision   Additional items Assist x 1;Increased time required;Bedrails  (to pt's L)   Sit to Supine 5  Supervision   Additional items Assist x 1;Increased time required;Bedrails   Additional Comments Pt supine HOB elevated upon arrival and at end of session w/ needs met, call bell in reach and bed alarm activated   Transfers   Sit to Stand 5  Supervision   Additional items Assist x 1;Increased time required;Verbal cues  (instruction for hand placement)   Stand to Sit 5  Supervision   Additional items Assist x 1;Increased time required;Verbal cues;Bedrails;Armrests   Toilet transfer 5  Supervision  (raised isis toilet in bathroom)   Additional items Assist x 1;Verbal cues;Raised toilet seat   Additional Comments Pt performed sit <> stand 2X w/ S   Functional Mobility   Functional Mobility 5  Supervision   Additional Comments + debbi using RW to /from bathroom   Additional items Rolling walker   Toilet Transfers   Toilet Transfer From Bed   Toilet Transfer Type To and from   Toilet Transfer to Raised toilet seat without rails   Toilet Transfer Technique Ambulating   Toilet Transfers Supervision;Verbal cues  (+ time)   Subjective   Subjective \"It feels good to wash my hands\"   Cognition   Overall Cognitive Status WFL   Arousal/Participation Alert;Cooperative   Attention Within functional limits   Orientation Level Oriented X4   Memory Within functional limits   Following Commands Follows multistep commands without difficulty   Comments Resting supine HOB elevated upon arrival. Agreeable to participate. Pt able to follow directions and participate in conversation   Activity Tolerance   Activity Tolerance Patient limited by fatigue  (dizzy, light headed)   Medical Staff Made Aware " spoke w/ RNNicolas; PT, Lizeth and CMLizeth   Assessment   Assessment Pt seen for skilled OT tx session day 1 focusing on activity engagement, challenging activity tolerance, ongoing eval and pt education. Pt agreeable to participate and motivated to return home to baseline level of I. Pt required less physical assistance and demonstrated improved activity tolerance. Pt tolerated standing at sink w/ fair balance to participate in grooming and engaged in functional mobility w/ use ofRW w/ S. From an OT perpsective, recommend level III rehab resource intensity when medically stable for discharge from acute care. Will continue to follow   Plan   Treatment Interventions ADL retraining;Functional transfer training;Endurance training;UE strengthening/ROM;Patient/family training;Equipment evaluation/education;Compensatory technique education;Continued evaluation;Energy conservation;Activityengagement   Goal Expiration Date 10/20/24   OT Treatment Day 1  (Monday 10/7/24)   OT Frequency 3-5x/wk   Discharge Recommendation   Rehab Resource Intensity Level, OT III (Minimum Resource Intensity)  (DC home using RW w/ OPPT)   Equipment Recommended Shower/Tub chair with back ($)   AM-PAC Daily Activity Inpatient   Lower Body Dressing 3   Bathing 3   Toileting 3   Upper Body Dressing 4   Grooming 4   Eating 4   Daily Activity Raw Score 21   Daily Activity Standardized Score (Calc for Raw Score >=11) 44.27   AM-PAC Applied Cognition Inpatient   Following a Speech/Presentation 4   Understanding Ordinary Conversation 4   Taking Medications 4   Remembering Where Things Are Placed or Put Away 4   Remembering List of 4-5 Errands 4   Taking Care of Complicated Tasks 4   Applied Cognition Raw Score 24   Applied Cognition Standardized Score 62.21   Barthel Index   Feeding 10   Bathing 0   Grooming Score 5   Dressing Score 5   Bladder Score 10   Bowels Score 10   Toilet Use Score 5   Transfers (Bed/Chair) Score 10   Mobility (Level Surface)  Score 0   Stairs Score 5   Barthel Index Score 60   Modified [place] Scale   Modified Stacia Scale 3   End of Consult   Education Provided Yes   Patient Position at End of Consult Supine;All needs within reach   Nurse Communication Nurse aware of consult   Licensure   [place] License Number  Yelena Rosenberg, OTR/L [id number]        The patient's raw score on the -PAC Daily Activity Inpatient Short Form is 21. A raw score of greater than or equal to 19 suggests the patient may benefit from discharge to home. Please refer to the recommendation of the Occupational Therapist for safe discharge planning.    Yelena Rosenberg, OTR/L  [id number]  [id number]

## 2024-11-24 ENCOUNTER — PATIENT OUTREACH (OUTPATIENT)
Facility: HOSPITAL | Age: 69
End: 2024-11-24
Payer: MEDICARE

## 2024-11-24 NOTE — PROGRESS NOTES
Population Health Chart Review & Patient Outreach Details    Updates Requested / Reviewed:  [x]  Care Team Updated    Health Maintenance Topics Addressed and Outreach Outcomes / Actions Taken:  Diabetic Eye Exam [x] HM Updated with September 2024 Eye Exam (Dr. Anthony). History Updated.

## 2024-11-25 ENCOUNTER — CLINICAL SUPPORT (OUTPATIENT)
Facility: HOSPITAL | Age: 69
End: 2024-11-25
Attending: HOSPITALIST
Payer: MEDICARE

## 2024-11-25 DIAGNOSIS — I21.4 NSTEMI (NON-ST ELEVATION MYOCARDIAL INFARCTION): Primary | ICD-10-CM

## 2024-11-25 PROCEDURE — 93798 PHYS/QHP OP CAR RHAB W/ECG: CPT

## 2024-11-25 NOTE — PROGRESS NOTES
Patient arrived for cardiac rehab session. Pt reports to have eaten prior to exercise session.  The patient was on continuous EKG monitoring throughout the session. The patient tolerated exercise session well with no complaints. Please see cardiac rehab daily session report from:  11/25/2024

## 2024-12-02 ENCOUNTER — HOSPITAL ENCOUNTER (OUTPATIENT)
Dept: CARDIOLOGY | Facility: HOSPITAL | Age: 69
Discharge: HOME | End: 2024-12-02
Attending: INTERNAL MEDICINE
Payer: MEDICARE

## 2024-12-02 DIAGNOSIS — Z95.1 PRESENCE OF AORTOCORONARY BYPASS GRAFT: ICD-10-CM

## 2024-12-02 PROCEDURE — 93017 CV STRESS TEST TRACING ONLY: CPT

## 2024-12-02 PROCEDURE — 93016 CV STRESS TEST SUPVJ ONLY: CPT | Mod: ,,, | Performed by: INTERNAL MEDICINE

## 2024-12-02 PROCEDURE — 93018 CV STRESS TEST I&R ONLY: CPT | Mod: ,,, | Performed by: INTERNAL MEDICINE

## 2024-12-03 LAB
CV STRESS BASE HR: 61 BPM
DIASTOLIC BLOOD PRESSURE: 70 MMHG
OHS CV CPX 1 MINUTE RECOVERY HEART RATE: 62 BPM
OHS CV CPX 85 PERCENT MAX PREDICTED HEART RATE MALE: 130
OHS CV CPX ESTIMATED METS: 5
OHS CV CPX MAX PREDICTED HEART RATE: 153
OHS CV CPX PATIENT IS FEMALE: 0
OHS CV CPX PATIENT IS MALE: 1
OHS CV CPX PEAK DIASTOLIC BLOOD PRESSURE: 74 MMHG
OHS CV CPX PEAK HEAR RATE: 90 BPM
OHS CV CPX PEAK RATE PRESSURE PRODUCT: NORMAL
OHS CV CPX PEAK SYSTOLIC BLOOD PRESSURE: 124 MMHG
OHS CV CPX PERCENT MAX PREDICTED HEART RATE ACHIEVED: 59
OHS CV CPX RATE PRESSURE PRODUCT PRESENTING: 6893
STRESS ECHO POST EXERCISE DUR MIN: 7 MINUTES
STRESS ECHO POST EXERCISE DUR SEC: 15 SECONDS
SYSTOLIC BLOOD PRESSURE: 113 MMHG

## 2024-12-04 ENCOUNTER — TELEPHONE (OUTPATIENT)
Dept: CARDIOLOGY | Facility: CLINIC | Age: 69
End: 2024-12-04
Payer: MEDICARE

## 2024-12-04 ENCOUNTER — CLINICAL SUPPORT (OUTPATIENT)
Facility: HOSPITAL | Age: 69
End: 2024-12-04
Attending: HOSPITALIST
Payer: MEDICARE

## 2024-12-04 DIAGNOSIS — I21.4 NSTEMI (NON-ST ELEVATION MYOCARDIAL INFARCTION): Primary | ICD-10-CM

## 2024-12-04 PROCEDURE — 93798 PHYS/QHP OP CAR RHAB W/ECG: CPT

## 2024-12-04 NOTE — TELEPHONE ENCOUNTER
Attempted to contact patient's number and patient's wife. No answer for either and voicemail not set up.  ----- Message from Socrates Porter DO sent at 12/3/2024  5:15 PM CST -----  Regarding: stress test  Please confirm patient has appt to discuss abnormal stress test  ----- Message -----  From: Socrates Porter DO  Sent: 12/3/2024   2:16 PM CST  To: Socrates Porter DO

## 2024-12-04 NOTE — PROGRESS NOTES
Patient arrived for cardiac rehab session. Pt reports to have eaten prior to exercise session.  The patient was on continuous EKG monitoring throughout the session. The patient tolerated exercise session well with no complaints. Please see cardiac rehab daily session report from:  12/04/2024

## 2024-12-09 ENCOUNTER — CLINICAL SUPPORT (OUTPATIENT)
Facility: HOSPITAL | Age: 69
End: 2024-12-09
Attending: HOSPITALIST
Payer: MEDICARE

## 2024-12-09 DIAGNOSIS — I21.4 NSTEMI (NON-ST ELEVATION MYOCARDIAL INFARCTION): Primary | ICD-10-CM

## 2024-12-09 PROCEDURE — 93798 PHYS/QHP OP CAR RHAB W/ECG: CPT

## 2024-12-10 ENCOUNTER — CLINICAL SUPPORT (OUTPATIENT)
Facility: HOSPITAL | Age: 69
End: 2024-12-10
Attending: HOSPITALIST
Payer: MEDICARE

## 2024-12-10 VITALS
DIASTOLIC BLOOD PRESSURE: 60 MMHG | HEART RATE: 70 BPM | OXYGEN SATURATION: 96 % | RESPIRATION RATE: 18 BRPM | SYSTOLIC BLOOD PRESSURE: 114 MMHG | TEMPERATURE: 98 F

## 2024-12-10 DIAGNOSIS — I21.4 NSTEMI (NON-ST ELEVATION MYOCARDIAL INFARCTION): Primary | ICD-10-CM

## 2024-12-10 PROCEDURE — 93798 PHYS/QHP OP CAR RHAB W/ECG: CPT

## 2024-12-10 NOTE — PROGRESS NOTES
Cardiac Rehab Individual Treatment Plan - Discharge Assessment      Patient Name: Richard Soliz MRN: [id number]   : 1955   Age: 69 y.o.   Diagnosis: NSTEMI    Psychosocial Assessment:   Outcome Survey Tools:    ANJANA SCORES:       TOTAL    Post    Anxiety  4    Depression  6    Somatization  6    Hostility  9           SF-36 SCORES:        2024 12/10/2024   SF 36 HEALTH QUESTIONNAIRE   OVERALL TOTAL SCORE     Physical Functioning 30 75   Role limitations due to physical health 0 100   Role limitations due to emotional problems 33.33 100   Energy/fatigue 15 50   Emotional well-being 92 76   Social functioning 100 75   Pain 65 80   General health 75 80   --     QUESTIONNAIRE ANSWERS     In general, how would you say your health is? fair very good   Compared to one year ago, how would you rate your health in general now? about the same much better now than one year ago   VIGOROUS ACTIVITIES (running, lifting heavy objects, strenuous sport) yes, limited a lot yes, limited a little   MODERATE ACTIVITES (moving a table, vacuuming, bowling or golf) yes, limited a lot yes, limited a little   Lifting or carrying groceries Yes limited a lot Yes limited a little   Climbing SEVERAL flights of stairs Yes limted a lot Yes limited a little   Climbing ONE flight of stairs yes, limited a little yes, limited a little   Bending or kneeling yes, limited a little no, not limited at all   Walking more than a mile yes, limited a lot no, not limited at all   Walking 100 yards (150-200 paces) yes, limited a lot no, not limited at all   Walking 100 yards (150-200 paces) No not limited No not limited   Bathing and dressing yourself no, not limited at all no, not limited at all   Cut down on the amount of time you spent on work or other activities yes no   Accomplished less than you would have liked yes no   Were limited in the kind of work or other activities yes no   Had difficulty performing the work or other activities  (i.e. took extra effort) yes no   Cut down on the amount of time you spent on work or other activities no no   Accomplished less than you would like yes no   Did not do work or other activities as carefully as usual yes no   During the past four weeks, to what extent has your physical health or emotional problems interfered with your normal social activities with family, friends, neighbors or groups? not at all not at all   How much bodily pain have you had in the last four weeks? very mild mild   During the past four weeks, how much did pain interfere with your normal work (including work both outside the home and housework)? moderately not at all   Did you feel full of pep? a little bit of the time some of the time   Have you been a very nervous person? none of the time none of the time   Have you felt so down in the dumps that nothing could cheer you up? none of the time none of the time   Have you felt calm and peaceful? most of the time good bit of the time   Did you have a lot of energy? none of the time a little bit of the time   Have you felt downhearted and blue? none of the time a little bit of the time   Did you feel worn out? most of the time a little bit of the time   Have you been a happy person? most of the time some of the time   Did you feel tired? most of the time some of the time   Has your health limited your social activities such as visiting relatives or friends? none of the time some of the time   I seem to get ill more easily than other people. definitely false mostly false   I am as healthy as anybody I know. mostly true mostly true   I expect my health to get worse. definitely false definitely false   My health is excellent. mostly true mostly true              PHQ 9:      11/14/2024     2:45 PM 11/19/2024     8:06 AM 12/10/2024     1:00 PM   PHQ-9 Depression Patient Health Questionnaire   Over the last two weeks how often have you been bothered by little interest or pleasure in doing  things 0 0 0   Over the last two weeks how often have you been bothered by feeling down, depressed or hopeless 0 0 0   Over the last two weeks how often have you been bothered by trouble falling or staying asleep, or sleeping too much   0   Over the last two weeks how often have you been bothered by feeling tired or having little energy   2   Over the last two weeks how often have you been bothered by a poor appetite or overeating   0   Over the last two weeks how often have you been bothered by feeling bad about yourself - or that you are a failure or have let yourself or your family down   0   Over the last two weeks how often have you been bothered by trouble concentrating on things, such as reading the newspaper or watching television   0   Over the last two weeks how often have you been bothered by moving or speaking so slowly that other people could have noticed.   0   Over the last two weeks how often have you been bothered by thoughts that you would be better off dead, or of hurting yourself   0   If you checked off any problems, how difficult have these problems made it for you to do your work, take care of things at home or get along with other people?   Not difficult at all   PHQ-9 Score   2            Family Support: family and spouse  Self Reported: Effective Coping Skills  Displays: socializes with others, happiness, smiles often, and calmness    Psychosocial Plan:   Goals:  Improved psychosocial coping strategies: Met  Verbalizes coping mechanisms: Met  Maintain positive support system: Met  Maintain positive outlook: Met  Improve overall quality of life: Met    Comments on Goal Progression:  Patient is discharged in stable condition after successfully completing the cardiac rehabilitation program. He has shown significant progress in meeting his individualized goals, and has demonstrated a strong commitment to maintaining a cardiac-healthy lifestyle. The patient reports a positive outlook and  highlights his family as a vital part of his coping strategies. He remains motivated to continue improving his quality of life through regular exercise and making healthy choices. Educational materials and resources have been provided, and he has been advised to follow his prescribed home exercise program and attend scheduled follow-up appointments with his healthcare providers. Patient expresses confidence in sustaining his progress and utilizing his strong support system effectively.     Interventions/Recommendations:  Discussed Results of Surveys: Yes  Notify MD: No  Program Referral: No  Pharmaceutical Intervention/Therapy: No  Physical Activity: Yes  Continue Patient Education: Yes  Other Needs: not applicable  Stage of Readiness to Change: Maintenance    Education:  Advanced Directives; verbalizes understanding; Date: 11/21/24  End of Life; verbalizes understanding; Date: 11/21/24  Coping Techniques; verbalizes understanding; Date: 11/11/24  Sexuality; verbalizes understanding; Date: 11/14/24  Stress Management; verbalizes understanding; Date: 11/11/24  Relaxation Techniques; verbalizes understanding; Date: 10/02/24  Stress; verbalizes understanding; Date: 11/11/24  Insomnia; verbalizes understanding; Date: 11/6/24      Patient has been instructed to seek attention via PCP/Psychiatry in the event that circumstances change. Patient verbalizes understanding.     Other Core Components/Risk Factors Assessment:   RISK FACTORS:  diabetes, nutrition, hyperlipidemia, hypertension, positive family history, sedentary lifestyle, exercise    Learning Barriers: None    Education Level:  High School (9-12) or GED    Pre-Test Score Post-Test Score   60% 70%     Medication Compliance: has been compliant with taking medications    Other Core Components/Risk Factors Plan:   Goals:  Decrease cholesterol level: Met  Increase exercise tolerance: Met  Increase knowledge of CAD: Met  Decrease blood pressure: Met  Weight loss: In  Progress  Demonstrate accurate pulse taking: Met  Identify and manage personal areas of stress: Met  Control diabetes by adjusting diet and exercise: In Progress  Learn more about healthy eating: Met    Comments on Goal Progression:  Patient successfully met his individualized goals as outlined on her goal progression chart, demonstrating significant improvement and adherence to the cardiac rehab program.    Interventions/Recommendations:  Individual Education/Counseling: No  Physician Referral: No    Education:    arrhythmias, verbalizes understanding; Date: 10/3/24  blood pressure meds, verbalizes understanding; Date: 11/7/24  cholesterol, verbalizes understanding; Date: 10/10/24  cholesterol meds, verbalizes understanding; Date: 11/7/24  coumadin, verbalizes understanding; Date: 11/7/24  diabetes, verbalizes understanding; Date: 9/23/24  fluid overload/CHF, verbalizes understanding; Date: 10/7/24  hypertension, verbalizes understanding; Date: 9/19/24  nutrition, verbalizes understanding; Date: 9/16/24  physical activity, verbalizes understanding; Date: 9/18/24  risk factors, verbalizes understanding; Date: 9/4/24  smoking, verbalizes understanding; Date: 9/4/24  sodium, verbalizes understanding; Date: 9/9/24  stress, verbalizes understanding; Date: 11/11/24           Other Core Components/Hypertension Assessment:   BP Range: 114/60  Patient reported symptoms: none    Medications:  Medication Prescribed? Adherent? Exception   Beta-blocker []Yes  []No  []Unknown []Yes  []No  []Unknown    ACEI/ARB []Yes  []No  []Unknown []Yes  []No  []Unknown    Calcium Channel Blocker []Yes  []No  []Unknown []Yes  []No  []Unknown    Diuretic []Yes  []No  []Unknown []Yes  []No  []Unknown        Other Core Components/Hypertension Plan:   Goals:  Blood Pressure <130/80: Met    Comments on Goal Progression:  Patient successfully met his individualized goals as outlined on her goal progression chart, demonstrating significant  improvement and adherence to the cardiac rehab program.    Interventions/Recommendations:  Encourage medication compliance  Encourage sodium reduction  Reduce alcohol consumption  Encourage weight loss  Recommend physical activity  Educate on contributory factors  Reduce stress, anxiety, anger, depression, and/or chronic pain  Encourage home blood pressure monitoring  Recommend daily weights    Comments on Goal Progression:  See monthly report in Epic for blood pressure trends  Information given to patient for [facility]: No  N/A      Education:    Hypertension; verbalizes understanding; Date: 9/19/24  Coronary Artery Disease; verbalizes understanding; Date: 10/03/24  Congestive Heart Failure; verbalizes understanding; Date: 10/7/24  Risk Factors; verbalizes understanding; Date: 9/4/24  Medication I; verbalizes understanding; Date: 11/7/24  Medication II; verbalizes understanding; Date: 9/5/247  Stroke; verbalizes understanding; Date: 11/21/24  Stress; verbalizes understanding; Date: 11/11/24             Does the patient have Heart Failure? No    Other Core Components/Tobacco Cessation Assessment:   Smoking Status: Former (>6 months)  Primary Tobacco Type: Cigarette  Tobacco Usage:  N/A  Smoking Cessation Barriers:  N/A  Stage of Readiness to Change: Maintenance    Other Core Components/Tobacco Cessation Plan:   Goals:  Maintain non-smoking status: Met    Comments on Goal Progression:  N/A    Interventions/Recommendations:  Maintains non-smoking status    Education:    Risk Factors; verbalizes understanding; Date: 9/4/24           Comments:   Patient has successfully completed the Cardiac Rehabilitation program, demonstrating full understanding of all educational materials covered during the program. Handouts and resources have been provided to reinforce key concepts, including exercise, resistance training, and lifestyle modifications. The patient has been consistently compliant with the  prescribed exercise regimen, resistance training, and educational requirements of the program. He maintains a positive outlook and expresses enthusiasm about his upcoming graduation from the rehab program, feeling confident in his ability to sustain his progress and continue improving his cardiac health.     Patient has been instructed to follow up with Cardiologist for any further cardiac issues. Information on Medical Fitness Program at [facility] given to patient.

## 2024-12-27 DIAGNOSIS — N13.8 BPH WITH OBSTRUCTION/LOWER URINARY TRACT SYMPTOMS: ICD-10-CM

## 2024-12-27 DIAGNOSIS — E11.9 DIABETES MELLITUS WITHOUT COMPLICATION: ICD-10-CM

## 2024-12-27 DIAGNOSIS — N40.1 BPH WITH OBSTRUCTION/LOWER URINARY TRACT SYMPTOMS: ICD-10-CM

## 2024-12-27 RX ORDER — FINASTERIDE 5 MG/1
5 TABLET, FILM COATED ORAL DAILY
Qty: 90 TABLET | Refills: 3 | Status: SHIPPED | OUTPATIENT
Start: 2024-12-27 | End: 2025-12-27

## 2024-12-27 RX ORDER — METFORMIN HYDROCHLORIDE 1000 MG/1
1000 TABLET ORAL 2 TIMES DAILY
Qty: 180 TABLET | Refills: 3 | Status: SHIPPED | OUTPATIENT
Start: 2024-12-27

## 2025-01-21 ENCOUNTER — HOSPITAL ENCOUNTER (OUTPATIENT)
Dept: RADIOLOGY | Facility: HOSPITAL | Age: 70
Discharge: HOME | End: 2025-01-21
Attending: STUDENT IN AN ORGANIZED HEALTH CARE EDUCATION/TRAINING PROGRAM
Payer: MEDICARE

## 2025-01-21 ENCOUNTER — PATIENT MESSAGE (OUTPATIENT)
Dept: PULMONOLOGY | Facility: CLINIC | Age: 70
End: 2025-01-21
Payer: MEDICARE

## 2025-01-21 DIAGNOSIS — J18.9 PNEUMONIA OF LEFT LOWER LOBE DUE TO INFECTIOUS ORGANISM: ICD-10-CM

## 2025-01-21 PROCEDURE — 71250 CT THORAX DX C-: CPT | Mod: TC

## 2025-01-21 PROCEDURE — 71250 CT THORAX DX C-: CPT | Mod: 26,,, | Performed by: RADIOLOGY

## 2025-01-22 ENCOUNTER — OFFICE VISIT (OUTPATIENT)
Dept: PULMONOLOGY | Facility: CLINIC | Age: 70
End: 2025-01-22
Payer: MEDICARE

## 2025-01-22 ENCOUNTER — CLINICAL SUPPORT (OUTPATIENT)
Dept: PULMONOLOGY | Facility: HOSPITAL | Age: 70
End: 2025-01-22
Attending: INTERNAL MEDICINE
Payer: MEDICARE

## 2025-01-22 ENCOUNTER — TELEPHONE (OUTPATIENT)
Dept: PHARMACY | Facility: CLINIC | Age: 70
End: 2025-01-22
Payer: MEDICARE

## 2025-01-22 VITALS
HEIGHT: 64 IN | OXYGEN SATURATION: 95 % | HEIGHT: 64 IN | RESPIRATION RATE: 16 BRPM | WEIGHT: 166 LBS | SYSTOLIC BLOOD PRESSURE: 116 MMHG | OXYGEN SATURATION: 95 % | WEIGHT: 168 LBS | BODY MASS INDEX: 28.34 KG/M2 | BODY MASS INDEX: 28.68 KG/M2 | DIASTOLIC BLOOD PRESSURE: 80 MMHG | HEART RATE: 62 BPM

## 2025-01-22 DIAGNOSIS — J45.40 MODERATE PERSISTENT ASTHMA WITHOUT COMPLICATION: ICD-10-CM

## 2025-01-22 DIAGNOSIS — J43.1 PANLOBULAR EMPHYSEMA: Primary | ICD-10-CM

## 2025-01-22 DIAGNOSIS — J98.6 ACQUIRED ELEVATED DIAPHRAGM: ICD-10-CM

## 2025-01-22 LAB
HCO3 UR-SCNC: 20.9 MMOL/L (ref 21–28)
PCO2 BLDA: 37 MMHG (ref 35–48)
PH SMN: 7.36 [PH] (ref 7.35–7.45)
PO2 BLDA: 77 MMHG (ref 83–108)
POC BASE EXCESS: -4.1 MMOL/L (ref -2–3)
POC SATURATED O2: 95 % (ref 95–98)

## 2025-01-22 PROCEDURE — 27100098 HC SPACER

## 2025-01-22 PROCEDURE — 36600 WITHDRAWAL OF ARTERIAL BLOOD: CPT

## 2025-01-22 PROCEDURE — 94618 PULMONARY STRESS TESTING: CPT | Mod: 26,,, | Performed by: STUDENT IN AN ORGANIZED HEALTH CARE EDUCATION/TRAINING PROGRAM

## 2025-01-22 PROCEDURE — 99215 OFFICE O/P EST HI 40 MIN: CPT | Mod: PBBFAC,25 | Performed by: STUDENT IN AN ORGANIZED HEALTH CARE EDUCATION/TRAINING PROGRAM

## 2025-01-22 PROCEDURE — 99999 PR PBB SHADOW E&M-EST. PATIENT-LVL V: CPT | Mod: PBBFAC,,, | Performed by: STUDENT IN AN ORGANIZED HEALTH CARE EDUCATION/TRAINING PROGRAM

## 2025-01-22 PROCEDURE — 94726 PLETHYSMOGRAPHY LUNG VOLUMES: CPT

## 2025-01-22 PROCEDURE — 94729 DIFFUSING CAPACITY: CPT

## 2025-01-22 PROCEDURE — 94060 EVALUATION OF WHEEZING: CPT | Mod: 26,59,, | Performed by: STUDENT IN AN ORGANIZED HEALTH CARE EDUCATION/TRAINING PROGRAM

## 2025-01-22 PROCEDURE — 94726 PLETHYSMOGRAPHY LUNG VOLUMES: CPT | Mod: 26,,, | Performed by: STUDENT IN AN ORGANIZED HEALTH CARE EDUCATION/TRAINING PROGRAM

## 2025-01-22 PROCEDURE — 99214 OFFICE O/P EST MOD 30 MIN: CPT | Mod: 25,S$PBB,, | Performed by: STUDENT IN AN ORGANIZED HEALTH CARE EDUCATION/TRAINING PROGRAM

## 2025-01-22 PROCEDURE — 94729 DIFFUSING CAPACITY: CPT | Mod: 26,,, | Performed by: STUDENT IN AN ORGANIZED HEALTH CARE EDUCATION/TRAINING PROGRAM

## 2025-01-22 PROCEDURE — 94060 EVALUATION OF WHEEZING: CPT

## 2025-01-22 PROCEDURE — 94618 PULMONARY STRESS TESTING: CPT

## 2025-01-22 RX ORDER — FLUTICASONE FUROATE, UMECLIDINIUM BROMIDE AND VILANTEROL TRIFENATATE 100; 62.5; 25 UG/1; UG/1; UG/1
1 POWDER RESPIRATORY (INHALATION) DAILY
Qty: 30 EACH | Refills: 11 | Status: SHIPPED | OUTPATIENT
Start: 2025-01-22

## 2025-01-22 NOTE — LETTER
January 22, 2025    Richard Soliz  [address] [place] [address]             Dear Mr. Soliz        My name is Ju Brown.  I am reaching out on behalf of [facility]s Pharmacy Patient Assistance Team after receiving a referral from your Provider inquiring about assistance with your Trelegy Ellipta. Unfortunately, The Pharmacy Patient Assistance Team is unable to submit your application at this time due to the following reasons       [facility] Patient Assistance Program  Proof that you have spent $600 out-of-pocket on prescription medications.   Documentation includes all pages of the patient's most recent Medicare Pat D Prescription Drug plan statement (Explanation of Benefits-EOB) indicating the patient has paid a total of $600 for prescriptions in the current calaendar year.  If the statement is not available, please callthe [facility] PAP at [phone] for help to identify other sources of proof.     Patient may be eligible for a Medicare Prescription Payment Plan. Please contact your insurance company for enrollment details.        Sincerely  Ju BONILLA @[phone]  Pharmacy Patient Assistance  [address]  [place] [address]  Fax: [phone]  [email or web address]

## 2025-01-22 NOTE — PROGRESS NOTES
[facility]  Pulmonology  ESTABLISHED VISIT     Patient Name:  Richard Soliz  Primary Care Provider: Valentina Mcintyre FNP  Date of Service: 1/22/2025   Reason for Referral: J44.9 (ICD-10-CM) - Chronic obstructive pulmonary disease, unspecified COPD type       Chief Complaint: Shortness of breath    SUBJECTIVE   HPI:  Richard Soliz is a 69 y.o. male with CAD s/p CABG (06/2024), DIIM, HTN and asthma who presents for follow-up of shortness of breath.  Last seen 11/2024 with plan for up step to Trelegy 100 daily, PFT, 6MWT, ABG and CT chest.  He is accompanied by his wife who assists with history.    Martínez reports feeling well on this assessment.  He has no exacerbations in the past month since last seen.  He has had consistent use of his inhaler, however, with regards with the Trelegy it had a 700 dollar out-of-pocket cost with filling of the prescription deferred.  We reviewed the results of his testing to date.    Initial HPI  Martínez reports having shortness of breath that is present at times and he lays down.  He has had a cough that is at times productive but mostly nonproductive and sounds wet.  He states that he has had asthma since his young adulthood that required hospitalization, however, he has not required intubation in his life.  He had frequent lung infections including bronchitis in his childhood that required repeat treatment with antibiotics.  He has previously been told that he has a scar on his left lung from his previous infections.  He smoked previously about 2 packs and quit over 20 years ago.  He presented to PCP last week with chest x-ray for which he was started on an antibiotic course for treatment of a left-sided pneumonia.  He feels improved since starting the antibiotics.  He was previously on Advair and transitioned to Anoro Ellipta and states that he prefers the Ellipta device as he can feel that he is breathing in the medication.  He is currently participating with  cardiac rehabilitation and feels like his breathing improves after exercise. He had COVID 08/2021 characterized by fever and generalized malaise no respiratory symptoms.  He was managed outpatient.  We reviewed his chest x-rays to date.      Past Medical History:   Diagnosis Date    Acquired asymmetry of prostate     Acute prostatitis 10/29/2021    Acute prostatitis 10/29/2021    Allergic rhinitis     Asthma     BPH with obstruction/lower urinary tract symptoms 10/29/2021    [facility]     Community acquired pneumonia of left lower lobe of lung 2018    COPD (chronic obstructive pulmonary disease)     Diabetic eye exam 09/25/2024    Dr. Carlitos Anthony - [facility]    DM (diabetes mellitus), type 2 with ophthalmic complications 09/25/2024    Essential hypertension     GERD (gastroesophageal reflux disease)     Hyperlipidemia     Mild nonproliferative diabetic retinopathy of both eyes without macular edema 09/25/2024    See September 2024 Eye Exam - Dr. Anthony    Personal history of colonic polyps 01/16/2017    repeat colonoscopy in 3 years    Type 2 diabetes mellitus     Vitamin D deficiency        Past Surgical History:   Procedure Laterality Date    COLONOSCOPY W/ BIOPSIES  01/16/2017    repeat in 3 years    CORONARY ANGIOGRAPHY INCLUDING BYPASS GRAFTS WITH CATHETERIZATION OF LEFT HEART N/A 5/28/2024    Procedure: ANGIOGRAM, CORONARY, INCLUDING BYPASS GRAFT, WITH LEFT HEART CATHETERIZATION;  Surgeon: Avery Cooper MD;  Location: [facility] CATH LAB;  Service: Cardiology;  Laterality: N/A;    nose surgery for broken nose as a child      right carpal tunnel surgery  03/2021    TONSILLECTOMY AND ADENOIDECTOMY         Family History   Problem Relation Name Age of Onset    Diabetes Mother      Hyperlipidemia Mother      Hypertension Mother      Diabetes Father      Heart failure Father      Hypertension Father      Cancer Father      Multiple sclerosis Sister      Hypertension Brother       Nephrolithiasis Brother          Social History     Socioeconomic History    Marital status:      Spouse name: cecelia    Number of children: 1    Years of education: 12    Highest education level: 12th grade   Occupational History    Occupation:    Tobacco Use    Smoking status: Former     Current packs/day: 0.00     Average packs/day: 2.0 packs/day for 42.0 years (84.0 ttl pk-yrs)     Types: Cigarettes     Start date:      Quit date: 2019     Years since quittin.0    Smokeless tobacco: Never    Tobacco comments:     quit smoking 26 years ago   Substance and Sexual Activity    Alcohol use: Not Currently    Drug use: Never    Sexual activity: Not Currently   Social History Narrative    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     Social Drivers of Health     Financial Resource Strain: Low Risk  (2024)    Overall Financial Resource Strain (CARDIA)     Difficulty of Paying Living Expenses: Not hard at all   Food Insecurity: No Food Insecurity (2024)    Hunger Vital Sign     Worried About Running Out of Food in the Last Year: Never true     Ran Out of Food in the Last Year: Never true   Transportation Needs: No Transportation Needs (2024)    TRANSPORTATION NEEDS     Transportation : No   Physical Activity: Insufficiently Active (2024)    Exercise Vital Sign     Days of Exercise per Week: 3 days     Minutes of Exercise per Session: 30 min   Stress: No Stress Concern Present (2024)    [employer] - Occupational Stress Questionnaire     Feeling of Stress : Not at all   Housing Stability: Low Risk  (2024)    Housing Stability Vital Sign     Unable to Pay for Housing in the Last Year: No     Homeless in the Last Year: No       Social History     Social History Narrative    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       Review of patient's  "allergies indicates:   Allergen Reactions    Morphine     Opioids - morphine analogues         Medications: Medications reviewed to include over the counter medications.    Review of Systems: A focused ROS was completed and found to be negative except for that mentioned above.      OBJECTIVE   PHYSICAL EXAM:  Vitals:    01/22/25 0947   BP: 116/80   BP Location: Left arm   Patient Position: Sitting   Pulse: 62   Resp: 16   SpO2: 95%   Weight: 76.2 kg (167 lb 15.9 oz)   Height: 5' 4" (1.626 m)      GENERAL: NAD  HEENT: normocephalic, non-icteric conjunctivae, moist oral mucosa  RESPIRATORY: clear to auscultation, no wheezing, rales or rhonchi  CARDIOVASCULAR: Regular rate and rhythm, no murmurs rubs or gallops  SKIN: no rash, jaundice, ecchymosis or ulcers  MUSCULOSKELETAL: No clubbing or cyanosis; no pedal edema  NEUROLOGIC: AO ×3, no gross deficits    LABS:  Lab studies reviewed and notable for H/H 16.0/49.1, SEos 280; peak 370 (12/2024), CO2 21, SCr 1.11 (11/2024), ABG 01/2025 7.36/37/77/20.9/95    IMAGING:  Imaging reviewed and notable for CXR 11/2024 with clear right lung fields, left hemidiaphragm elevation, sternotomy wires present, no pleural effusion.  CXR 05/2024 with clear lung fields bilaterally, normal bilateral hemidiaphragm excursion, no pleural effusion CT chest 01/2025 with centrilobular and panacinar emphysema with upper lobe predominance, persistent left hemidiaphragm elevation, no pleural effusion, lymph node versus subpleural left-sided nodule    TTE:  05/2024: LVEF 45-50%, normal RV size, TAPSE 1.69, normal LA size, normal RA size, mild MR, trace TR    LUNG FUNCTION TESTING:   SPIROMETRY/PFT:  01/2025 Pre Post   FVC 2.50/-2.24 2.68/-1.93   FEV1 1.28/-3.28 1.48/-2.92 +BD   FEV1/FVC 51/-3.03 55/-2.62   TLC 6.11/-0.13    FRC  4.77/1.73    RV 3.56/2.99    DLCO 10.02/-4.19      6MWD:  Date Distance (ft) Resting SpO2; Robin SpO2 O2 Required   01/2025 1497 95%, RA, 92% None             ASSESSMENT & PLAN "     1. Panlobular emphysema  Assessment & Plan:  70 yo M with prior nicotine dependence (quit 20 years ago) presents for follow-up evaluation with no interval exacerbation.  He has COPD and asthma overlap.  Lung function testing with PFT demonstrates moderate obstruction with bronchodilator responsiveness, gas trapping, hyperinflation and severe diffusion deficit.  He has excellent functional capacity with a 93% predicted walk distance and ABG with normal PaCO2 37 despite left hemidiaphragm elevation from CABG.  Overall, good functional capacity having completed cardiac rehabilitation after his CABG.  Plan to continue therapy and we will obtain a pharmacy assistance referral for Trelegy 100 daily.  CT imaging reviewed with emphysematous changes and stable left lower lobe nodule versus lymph node.  For the overlap symptoms, as above therapy to include ICS and we will continue montelukast.    Orders:  -     fluticasone-umeclidin-vilanter (TRELEGY ELLIPTA) 100-62.5-25 mcg DsDv; Inhale 1 puff into the lungs once daily.  Dispense: 30 each; Refill: 11  -     Ambulatory referral/consult to Pharmacy Assistance; Future; Expected date: 01/29/2025    2. Moderate persistent asthma without complication  -     fluticasone-umeclidin-vilanter (TRELEGY ELLIPTA) 100-62.5-25 mcg DsDv; Inhale 1 puff into the lungs once daily.  Dispense: 30 each; Refill: 11             Follow up in about 8 months (around 9/22/2025) for Routine follow up.      Case was discussed with patient; all questions were answered to patient's satisfaction and patient verbalized understanding.       Selam Phelan MD  Pulmonary Medicine  [facility]  Phone: [phone]

## 2025-01-22 NOTE — TELEPHONE ENCOUNTER
[employer] Patient Assistance Program Proof that Mr. Soliz has spent $600 out-of-pocket on prescription medications.   Documentation includes all pages of the patient's most recent Medicare Pat D Prescription Drug plan statement (Explanation of Benefits-EOB) indicating the patient has paid a total of $600 for prescriptions in the current calaendar year.  If the statement is not available, please callthe [employer] PAP at [phone] for help to identify other sources of proof.      Patient may be eligible for a Medicare Prescription Payment Plan. Patient has been advised to contact his insurance company for enrollment details.           Sincerely   Ju Brown  Pharmacy Patient Assistance Team

## 2025-01-22 NOTE — PROCEDURES
SIX MINUTE WALK DISTANCE  BASELINE VITALS  Heart rate 67, /71, SpO2 95% on room air     END OF STUDY VITALS:  Heart rate 86, /73, SpO2 93% on room air    RECOVERY VITALS:  After 1 min rest  Heart rate 81, /74, SpO2 94% on room air    Patient walked 1497 ft with 0 rests.   SpO2 ashley was 92% at 5 minutes.       IMPRESSION:  Patient does not need oxygen supplementation at rest at rest or with ambulation.    Selam Phelan MD  Pulmonary and Critical Care  [facility]

## 2025-01-22 NOTE — PROGRESS NOTES
ABG drawn from right radial. Positive Alverto's test. Pressure held five minutes. Tolerated well with no adverse reaction. D/C in good condition.

## 2025-01-22 NOTE — PROCEDURES
PFT REPORT:  SPIROMETRY:  The pre-BD FVC is decreased, 2.50L/-2.24 Z score.  The pre-BD FEV1 is decreased, 1.28L/-3.28 Z score.  The pre-BD FEV1/FVC ratio is 51/-3.03 Z score.    The post-BD FVC is decreased, 2.68L/-1.93 Z score.  The post-BD FEV1 is decreased, 1.48L/-2.92 Z score.  The post-BD FEV1/FVC ratio is 55/-2.62 Z score.    There is a significant bronchodilator effect.  There is scooping out of the expiratory limb of the flow volume loop.     LUNG VOLUMES:  The TLC is normal, 6.11L/-0.13 Z score.  The FRC is increased, 4.77L/1.73 Z score.  The RV is increased, 3.56L/2.99 Z score.    DIFFUSION CAPACITY:  The diffusion capacity is corrected for hemoglobin and is decreased, 10.02/-4.19 Z score.    Interpretation:  The post-BD spirometry shows a moderate obstructive defect.  There is significant response to bronchodilators.   The flow volume loops is consistent with an obstructive ventilatory defect.   There is no restrictive defect. Hyperinflation is present. Gas trapping is present.  There is a severe diffusion defect. Decreased diffusion capacity with obstruction is suggestive of emphysema.      Selam Phelan MD  Pulmonary Medicine  [facility]

## 2025-01-22 NOTE — ASSESSMENT & PLAN NOTE
68 yo M with prior nicotine dependence (quit 20 years ago) presents for follow-up evaluation with no interval exacerbation.  He has COPD and asthma overlap.  Lung function testing with PFT demonstrates moderate obstruction with bronchodilator responsiveness, gas trapping, hyperinflation and severe diffusion deficit.  He has excellent functional capacity with a 93% predicted walk distance and ABG with normal PaCO2 37 despite left hemidiaphragm elevation from CABG.  Overall, good functional capacity having completed cardiac rehabilitation after his CABG.  Plan to continue therapy and we will obtain a pharmacy assistance referral for Trelegy 100 daily.  CT imaging reviewed with emphysematous changes and stable left lower lobe nodule versus lymph node.  For the overlap symptoms, as above therapy to include ICS and we will continue montelukast.

## 2025-04-14 DIAGNOSIS — F33.1 MODERATE EPISODE OF RECURRENT MAJOR DEPRESSIVE DISORDER: ICD-10-CM

## 2025-04-14 DIAGNOSIS — F41.9 ANXIETY: Chronic | ICD-10-CM

## 2025-04-14 RX ORDER — CITALOPRAM 40 MG/1
40 TABLET, FILM COATED ORAL DAILY
Qty: 90 TABLET | Refills: 3 | Status: SHIPPED | OUTPATIENT
Start: 2025-04-14 | End: 2026-04-09

## 2025-05-14 ENCOUNTER — OFFICE VISIT (OUTPATIENT)
Dept: FAMILY MEDICINE | Facility: CLINIC | Age: 70
End: 2025-05-14
Payer: MEDICARE

## 2025-05-14 VITALS
HEART RATE: 64 BPM | OXYGEN SATURATION: 95 % | SYSTOLIC BLOOD PRESSURE: 128 MMHG | RESPIRATION RATE: 18 BRPM | WEIGHT: 171.38 LBS | HEIGHT: 64 IN | TEMPERATURE: 98 F | DIASTOLIC BLOOD PRESSURE: 70 MMHG | BODY MASS INDEX: 29.26 KG/M2

## 2025-05-14 DIAGNOSIS — I15.2 OBESITY, DIABETES, AND HYPERTENSION SYNDROME: ICD-10-CM

## 2025-05-14 DIAGNOSIS — E11.69 OBESITY, DIABETES, AND HYPERTENSION SYNDROME: ICD-10-CM

## 2025-05-14 DIAGNOSIS — E11.59 OBESITY, DIABETES, AND HYPERTENSION SYNDROME: ICD-10-CM

## 2025-05-14 DIAGNOSIS — Z12.5 SCREENING FOR MALIGNANT NEOPLASM OF PROSTATE: ICD-10-CM

## 2025-05-14 DIAGNOSIS — E66.9 OBESITY, DIABETES, AND HYPERTENSION SYNDROME: ICD-10-CM

## 2025-05-14 DIAGNOSIS — E11.9 DIABETES MELLITUS WITHOUT COMPLICATION: Primary | ICD-10-CM

## 2025-05-14 DIAGNOSIS — J44.9 CHRONIC OBSTRUCTIVE PULMONARY DISEASE, UNSPECIFIED COPD TYPE: ICD-10-CM

## 2025-05-14 DIAGNOSIS — I10 HYPERTENSION, UNSPECIFIED TYPE: ICD-10-CM

## 2025-05-14 DIAGNOSIS — F33.1 MODERATE EPISODE OF RECURRENT MAJOR DEPRESSIVE DISORDER: ICD-10-CM

## 2025-05-14 PROBLEM — J18.9 PNEUMONIA OF LEFT LOWER LOBE DUE TO INFECTIOUS ORGANISM: Status: RESOLVED | Noted: 2024-11-19 | Resolved: 2025-05-14

## 2025-05-14 PROBLEM — R09.89 ABNORMAL LUNG SOUNDS: Status: RESOLVED | Noted: 2024-07-12 | Resolved: 2025-05-14

## 2025-05-14 LAB
ALBUMIN SERPL BCP-MCNC: 3.9 G/DL (ref 3.4–4.8)
ALBUMIN/GLOB SERPL: 1.4 {RATIO}
ALP SERPL-CCNC: 91 U/L (ref 40–150)
ALT SERPL W P-5'-P-CCNC: 31 U/L
ANION GAP SERPL CALCULATED.3IONS-SCNC: 15 MMOL/L (ref 7–16)
AST SERPL W P-5'-P-CCNC: 22 U/L (ref 11–45)
BASOPHILS # BLD AUTO: 0.08 K/UL (ref 0–0.2)
BASOPHILS NFR BLD AUTO: 0.9 % (ref 0–1)
BILIRUB SERPL-MCNC: 0.5 MG/DL
BUN SERPL-MCNC: 13 MG/DL (ref 8–26)
BUN/CREAT SERPL: 14 (ref 6–20)
CALCIUM SERPL-MCNC: 8.9 MG/DL (ref 8.8–10)
CHLORIDE SERPL-SCNC: 107 MMOL/L (ref 98–107)
CHOLEST SERPL-MCNC: 94 MG/DL
CHOLEST/HDLC SERPL: 2.2 {RATIO}
CO2 SERPL-SCNC: 21 MMOL/L (ref 23–31)
CREAT SERPL-MCNC: 0.95 MG/DL (ref 0.72–1.25)
CREAT UR-MCNC: 41 MG/DL (ref 23–375)
DIFFERENTIAL METHOD BLD: ABNORMAL
EGFR (NO RACE VARIABLE) (RUSH/TITUS): 88 ML/MIN/1.73M2
EOSINOPHIL # BLD AUTO: 0.24 K/UL (ref 0–0.5)
EOSINOPHIL NFR BLD AUTO: 2.6 % (ref 1–4)
ERYTHROCYTE [DISTWIDTH] IN BLOOD BY AUTOMATED COUNT: 12.8 % (ref 11.5–14.5)
EST. AVERAGE GLUCOSE BLD GHB EST-MCNC: 134 MG/DL
GLOBULIN SER-MCNC: 2.7 G/DL (ref 2–4)
GLUCOSE SERPL-MCNC: 134 MG/DL (ref 82–115)
HBA1C MFR BLD HPLC: 6.3 %
HCT VFR BLD AUTO: 48.3 % (ref 40–54)
HDLC SERPL-MCNC: 42 MG/DL (ref 35–60)
HGB BLD-MCNC: 15.7 G/DL (ref 13.5–18)
IMM GRANULOCYTES # BLD AUTO: 0.04 K/UL (ref 0–0.04)
IMM GRANULOCYTES NFR BLD: 0.4 % (ref 0–0.4)
LDLC SERPL CALC-MCNC: 36 MG/DL
LYMPHOCYTES # BLD AUTO: 2.18 K/UL (ref 1–4.8)
LYMPHOCYTES NFR BLD AUTO: 23.5 % (ref 27–41)
MCH RBC QN AUTO: 31.7 PG (ref 27–31)
MCHC RBC AUTO-ENTMCNC: 32.5 G/DL (ref 32–36)
MCV RBC AUTO: 97.6 FL (ref 80–96)
MICROALBUMIN UR-MCNC: <0.5 MG/DL
MICROALBUMIN/CREAT RATIO PNL UR: NORMAL
MONOCYTES # BLD AUTO: 0.78 K/UL (ref 0–0.8)
MONOCYTES NFR BLD AUTO: 8.4 % (ref 2–6)
MPC BLD CALC-MCNC: 8.6 FL (ref 9.4–12.4)
NEUTROPHILS # BLD AUTO: 5.94 K/UL (ref 1.8–7.7)
NEUTROPHILS NFR BLD AUTO: 64.2 % (ref 53–65)
NONHDLC SERPL-MCNC: 52 MG/DL
NRBC # BLD AUTO: 0 X10E3/UL
NRBC, AUTO (.00): 0 %
PLATELET # BLD AUTO: 232 K/UL (ref 150–400)
POTASSIUM SERPL-SCNC: 4.3 MMOL/L (ref 3.5–5.1)
PROT SERPL-MCNC: 6.6 G/DL (ref 5.8–7.6)
PSA SERPL-MCNC: 0.14 NG/ML
RBC # BLD AUTO: 4.95 M/UL (ref 4.6–6.2)
SODIUM SERPL-SCNC: 139 MMOL/L (ref 136–145)
TRIGL SERPL-MCNC: 80 MG/DL (ref 34–140)
TSH SERPL DL<=0.005 MIU/L-ACNC: 1.56 UIU/ML (ref 0.35–4.94)
VLDLC SERPL-MCNC: 16 MG/DL
WBC # BLD AUTO: 9.26 K/UL (ref 4.5–11)

## 2025-05-14 PROCEDURE — 85025 COMPLETE CBC W/AUTO DIFF WBC: CPT | Mod: ,,, | Performed by: CLINICAL MEDICAL LABORATORY

## 2025-05-14 PROCEDURE — 80053 COMPREHEN METABOLIC PANEL: CPT | Mod: ,,, | Performed by: CLINICAL MEDICAL LABORATORY

## 2025-05-14 PROCEDURE — 82570 ASSAY OF URINE CREATININE: CPT | Mod: ,,, | Performed by: CLINICAL MEDICAL LABORATORY

## 2025-05-14 PROCEDURE — G0103 PSA SCREENING: HCPCS | Mod: ,,, | Performed by: CLINICAL MEDICAL LABORATORY

## 2025-05-14 PROCEDURE — 99214 OFFICE O/P EST MOD 30 MIN: CPT | Mod: ,,, | Performed by: NURSE PRACTITIONER

## 2025-05-14 PROCEDURE — 82043 UR ALBUMIN QUANTITATIVE: CPT | Mod: ,,, | Performed by: CLINICAL MEDICAL LABORATORY

## 2025-05-14 PROCEDURE — 80061 LIPID PANEL: CPT | Mod: ,,, | Performed by: CLINICAL MEDICAL LABORATORY

## 2025-05-14 PROCEDURE — 83036 HEMOGLOBIN GLYCOSYLATED A1C: CPT | Mod: ,,, | Performed by: CLINICAL MEDICAL LABORATORY

## 2025-05-14 PROCEDURE — 84443 ASSAY THYROID STIM HORMONE: CPT | Mod: ,,, | Performed by: CLINICAL MEDICAL LABORATORY

## 2025-05-14 RX ORDER — FLUOXETINE 10 MG/1
10 CAPSULE ORAL DAILY
Qty: 90 CAPSULE | Refills: 3 | Status: SHIPPED | OUTPATIENT
Start: 2025-05-14

## 2025-05-14 NOTE — PROGRESS NOTES
Subjective     Patient ID: Richard Soliz is a 69 y.o. male.    Chief Complaint: Hyperlipidemia, s/p cabg, and Health Maintenance (Shingles Vaccine(1 of 2) will take @ pharmacy/RSV Vaccine (Age 60+ and Pregnant patients)(1 - Risk 60-74 years 1-dose series) will take @pharmacy/COVID-19 Vaccine(3 - 2024-25 season) declined/Hemoglobin A1c due on 06/02/2025 )    Pt presents for a diabetes follow-up. Pt is requesting to montes anti-depressants.       Review of Systems   Constitutional:  Negative for activity change, appetite change, fatigue and fever.   HENT:  Negative for nasal congestion, nosebleeds, postnasal drip, rhinorrhea, sinus pressure/congestion, sneezing and sore throat.    Eyes:  Negative for pain and itching.   Respiratory:  Negative for cough, chest tightness, shortness of breath, wheezing and stridor.    Cardiovascular:  Negative for chest pain.   Gastrointestinal:  Negative for abdominal pain.   Genitourinary:  Negative for dysuria.   Musculoskeletal:  Negative for back pain.   Neurological:  Negative for dizziness and headaches.   Psychiatric/Behavioral:  Negative for behavioral problems and confusion.           Objective     Physical Exam  Vitals and nursing note reviewed.   Constitutional:       Appearance: Normal appearance.   Cardiovascular:      Rate and Rhythm: Normal rate and regular rhythm.      Heart sounds: Normal heart sounds.   Pulmonary:      Effort: Pulmonary effort is normal.      Breath sounds: Normal breath sounds.   Musculoskeletal:         General: Normal range of motion.   Neurological:      Mental Status: He is alert and oriented to person, place, and time.   Psychiatric:         Mood and Affect: Mood normal.         Behavior: Behavior normal.            Assessment and Plan     1. Diabetes mellitus without complication  -     Microalbumin/Creatinine Ratio, Urine; Future; Expected date: 05/14/2025  -     Hemoglobin A1C; Future; Expected date: 05/14/2025  -     CBC Auto Differential;  Future; Expected date: 05/14/2025  -     Comprehensive Metabolic Panel; Future; Expected date: 05/14/2025  -     TSH; Future; Expected date: 05/14/2025  -     Lipid Panel; Future; Expected date: 05/14/2025  Low sugar diet  Exercise 3-5 times weekly    2. Obesity, diabetes, and hypertension syndrome    3. Moderate episode of recurrent major depressive disorder  -     FLUoxetine 10 MG capsule; Take 1 capsule (10 mg total) by mouth once daily.  Dispense: 90 capsule; Refill: 3  Stop celexa and start prozac.    4. Hypertension, unspecified type  Controlled at 128/70  Low sodium diet    5. Chronic obstructive pulmonary disease, unspecified COPD type  Controlled at this time    6. Screening for malignant neoplasm of prostate  -     PSA, Screening; Future; Expected date: 05/14/2025        Will call pt with lab results.          Follow up in about 6 months (around 11/14/2025).

## 2025-05-20 ENCOUNTER — PATIENT MESSAGE (OUTPATIENT)
Dept: FAMILY MEDICINE | Facility: CLINIC | Age: 70
End: 2025-05-20
Payer: MEDICARE

## 2025-06-16 RX ORDER — CITALOPRAM 40 MG/1
40 TABLET ORAL DAILY
Qty: 90 TABLET | Refills: 3 | Status: SHIPPED | OUTPATIENT
Start: 2025-06-16

## 2025-07-09 DIAGNOSIS — E78.5 HYPERLIPIDEMIA, UNSPECIFIED HYPERLIPIDEMIA TYPE: ICD-10-CM

## 2025-07-09 RX ORDER — ROSUVASTATIN CALCIUM 40 MG/1
40 TABLET, COATED ORAL NIGHTLY
Qty: 90 TABLET | Refills: 3 | Status: SHIPPED | OUTPATIENT
Start: 2025-07-09 | End: 2026-07-09

## 2025-08-20 DIAGNOSIS — J30.2 SEASONAL ALLERGIC RHINITIS, UNSPECIFIED TRIGGER: ICD-10-CM

## 2025-08-20 RX ORDER — MONTELUKAST SODIUM 10 MG/1
10 TABLET ORAL DAILY
Qty: 90 TABLET | Refills: 3 | Status: SHIPPED | OUTPATIENT
Start: 2025-08-20

## (undated) DEVICE — DRESSING TRANS 4X4 TEGADERM

## (undated) DEVICE — KIT GLIDESHEATH SLEND 6FR 10CM

## (undated) DEVICE — Device

## (undated) DEVICE — SCALPEL SZ 11 RETRACTABLE

## (undated) DEVICE — NDL PERCUTANEOUS 2.5CM 21G

## (undated) DEVICE — OXISENSOR ADULT DIGIT N/S

## (undated) DEVICE — CONTRAST ISOVUE 370 100ML

## (undated) DEVICE — CHLORAPREP 10.5 ML APPLICATOR

## (undated) DEVICE — SET EXTENSION CLEARLINK 2INJ

## (undated) DEVICE — CATH DIAG IMPULSE 6FR FR4

## (undated) DEVICE — TR BAND REG

## (undated) DEVICE — SET IV PRIMARY

## (undated) DEVICE — DECANTER FLUID TRNSF WHITE 9IN

## (undated) DEVICE — GLOVE SENSICARE PI SURG 7

## (undated) DEVICE — DRAPE ANGIO BRACH 38X44IN

## (undated) DEVICE — PROTECTOR ULNAR NERVE FOAM

## (undated) DEVICE — ETCO2 NC MICROSTR FEM ST ADLT

## (undated) DEVICE — GLOVE SENSICARE PI SURG 8

## (undated) DEVICE — CATH FL 3.5 5FR